# Patient Record
Sex: MALE | Race: WHITE | Employment: OTHER | URBAN - NONMETROPOLITAN AREA
[De-identification: names, ages, dates, MRNs, and addresses within clinical notes are randomized per-mention and may not be internally consistent; named-entity substitution may affect disease eponyms.]

---

## 2019-08-16 ENCOUNTER — APPOINTMENT (OUTPATIENT)
Dept: GENERAL RADIOLOGY | Age: 69
End: 2019-08-16
Payer: MEDICARE

## 2019-08-16 ENCOUNTER — APPOINTMENT (OUTPATIENT)
Dept: CT IMAGING | Age: 69
End: 2019-08-16
Payer: MEDICARE

## 2019-08-16 ENCOUNTER — HOSPITAL ENCOUNTER (EMERGENCY)
Age: 69
Discharge: HOME | End: 2019-08-16
Attending: FAMILY MEDICINE
Payer: MEDICARE

## 2019-08-16 VITALS
WEIGHT: 225 LBS | HEART RATE: 82 BPM | SYSTOLIC BLOOD PRESSURE: 121 MMHG | BODY MASS INDEX: 30.48 KG/M2 | RESPIRATION RATE: 18 BRPM | TEMPERATURE: 97.9 F | OXYGEN SATURATION: 100 % | DIASTOLIC BLOOD PRESSURE: 83 MMHG | HEIGHT: 72 IN

## 2019-08-16 DIAGNOSIS — S32.434A CLOSED NONDISPLACED FRACTURE OF ANTERIOR COLUMN OF RIGHT ACETABULUM, INITIAL ENCOUNTER (HCC): Primary | ICD-10-CM

## 2019-08-16 LAB
ALBUMIN SERPL-MCNC: 4.8 G/DL (ref 3.5–5.2)
ALP BLD-CCNC: 46 U/L (ref 40–130)
ALT SERPL-CCNC: 61 U/L (ref 5–41)
ANION GAP SERPL CALCULATED.3IONS-SCNC: 18 MMOL/L (ref 7–19)
APTT: 24.3 SEC (ref 26–36.2)
AST SERPL-CCNC: 49 U/L (ref 5–40)
BASOPHILS ABSOLUTE: 0 K/UL (ref 0–0.2)
BASOPHILS RELATIVE PERCENT: 0.1 % (ref 0–1)
BILIRUB SERPL-MCNC: 1.2 MG/DL (ref 0.2–1.2)
BUN BLDV-MCNC: 34 MG/DL (ref 8–23)
CALCIUM SERPL-MCNC: 10.3 MG/DL (ref 8.8–10.2)
CHLORIDE BLD-SCNC: 99 MMOL/L (ref 98–111)
CO2: 22 MMOL/L (ref 22–29)
CREAT SERPL-MCNC: 1.7 MG/DL (ref 0.5–1.2)
EOSINOPHILS ABSOLUTE: 0 K/UL (ref 0–0.6)
EOSINOPHILS RELATIVE PERCENT: 0.1 % (ref 0–5)
GFR NON-AFRICAN AMERICAN: 40
GLUCOSE BLD-MCNC: 112 MG/DL (ref 74–109)
HCT VFR BLD CALC: 52.5 % (ref 42–52)
HEMOGLOBIN: 17.7 G/DL (ref 14–18)
IMMATURE GRANULOCYTES #: 0 K/UL
INR BLD: 1.13 (ref 0.88–1.18)
LYMPHOCYTES ABSOLUTE: 0.7 K/UL (ref 1.1–4.5)
LYMPHOCYTES RELATIVE PERCENT: 8.1 % (ref 20–40)
MCH RBC QN AUTO: 33 PG (ref 27–31)
MCHC RBC AUTO-ENTMCNC: 33.7 G/DL (ref 33–37)
MCV RBC AUTO: 97.9 FL (ref 80–94)
MONOCYTES ABSOLUTE: 0.7 K/UL (ref 0–0.9)
MONOCYTES RELATIVE PERCENT: 8.4 % (ref 0–10)
NEUTROPHILS ABSOLUTE: 7 K/UL (ref 1.5–7.5)
NEUTROPHILS RELATIVE PERCENT: 82.9 % (ref 50–65)
PDW BLD-RTO: 13.1 % (ref 11.5–14.5)
PLATELET # BLD: 207 K/UL (ref 130–400)
PMV BLD AUTO: 10 FL (ref 9.4–12.4)
POTASSIUM SERPL-SCNC: 4.6 MMOL/L (ref 3.5–5)
PROTHROMBIN TIME: 13.9 SEC (ref 12–14.6)
RBC # BLD: 5.36 M/UL (ref 4.7–6.1)
SODIUM BLD-SCNC: 139 MMOL/L (ref 136–145)
TOTAL PROTEIN: 8.7 G/DL (ref 6.6–8.7)
WBC # BLD: 8.5 K/UL (ref 4.8–10.8)

## 2019-08-16 PROCEDURE — 85730 THROMBOPLASTIN TIME PARTIAL: CPT

## 2019-08-16 PROCEDURE — 36415 COLL VENOUS BLD VENIPUNCTURE: CPT

## 2019-08-16 PROCEDURE — 80053 COMPREHEN METABOLIC PANEL: CPT

## 2019-08-16 PROCEDURE — 85025 COMPLETE CBC W/AUTO DIFF WBC: CPT

## 2019-08-16 PROCEDURE — 6370000000 HC RX 637 (ALT 250 FOR IP): Performed by: FAMILY MEDICINE

## 2019-08-16 PROCEDURE — 73502 X-RAY EXAM HIP UNI 2-3 VIEWS: CPT

## 2019-08-16 PROCEDURE — 73030 X-RAY EXAM OF SHOULDER: CPT

## 2019-08-16 PROCEDURE — 2580000003 HC RX 258: Performed by: FAMILY MEDICINE

## 2019-08-16 PROCEDURE — 85610 PROTHROMBIN TIME: CPT

## 2019-08-16 PROCEDURE — 72192 CT PELVIS W/O DYE: CPT

## 2019-08-16 PROCEDURE — 71100 X-RAY EXAM RIBS UNI 2 VIEWS: CPT

## 2019-08-16 PROCEDURE — 99284 EMERGENCY DEPT VISIT MOD MDM: CPT

## 2019-08-16 RX ORDER — ONDANSETRON 4 MG/1
4 TABLET, ORALLY DISINTEGRATING ORAL ONCE
Status: COMPLETED | OUTPATIENT
Start: 2019-08-16 | End: 2019-08-16

## 2019-08-16 RX ORDER — LISINOPRIL AND HYDROCHLOROTHIAZIDE 25; 20 MG/1; MG/1
1 TABLET ORAL DAILY
COMMUNITY

## 2019-08-16 RX ORDER — HYDROCODONE BITARTRATE AND ACETAMINOPHEN 5; 325 MG/1; MG/1
2 TABLET ORAL EVERY 6 HOURS PRN
Qty: 12 TABLET | Refills: 0 | Status: SHIPPED | OUTPATIENT
Start: 2019-08-16 | End: 2019-08-19

## 2019-08-16 RX ORDER — ONDANSETRON 4 MG/1
4 TABLET, ORALLY DISINTEGRATING ORAL EVERY 8 HOURS PRN
Qty: 15 TABLET | Refills: 0 | Status: SHIPPED | OUTPATIENT
Start: 2019-08-16

## 2019-08-16 RX ORDER — 0.9 % SODIUM CHLORIDE 0.9 %
1000 INTRAVENOUS SOLUTION INTRAVENOUS ONCE
Status: COMPLETED | OUTPATIENT
Start: 2019-08-16 | End: 2019-08-16

## 2019-08-16 RX ORDER — HYDROCODONE BITARTRATE AND ACETAMINOPHEN 5; 325 MG/1; MG/1
1 TABLET ORAL ONCE
Status: COMPLETED | OUTPATIENT
Start: 2019-08-16 | End: 2019-08-16

## 2019-08-16 RX ADMIN — SODIUM CHLORIDE 1000 ML: 9 INJECTION, SOLUTION INTRAVENOUS at 10:15

## 2019-08-16 RX ADMIN — ONDANSETRON 4 MG: 4 TABLET, ORALLY DISINTEGRATING ORAL at 13:31

## 2019-08-16 RX ADMIN — HYDROCODONE BITARTRATE AND ACETAMINOPHEN 1 TABLET: 5; 325 TABLET ORAL at 13:31

## 2019-08-16 ASSESSMENT — PAIN SCALES - GENERAL
PAINLEVEL_OUTOF10: 2
PAINLEVEL_OUTOF10: 2
PAINLEVEL_OUTOF10: 3
PAINLEVEL_OUTOF10: 4

## 2019-08-16 ASSESSMENT — ENCOUNTER SYMPTOMS
COLOR CHANGE: 0
DIARRHEA: 0
COUGH: 0
VOMITING: 0
WHEEZING: 0
ABDOMINAL PAIN: 0
BACK PAIN: 0
SHORTNESS OF BREATH: 0
NAUSEA: 0
SORE THROAT: 0

## 2019-08-16 ASSESSMENT — PAIN DESCRIPTION - LOCATION
LOCATION: HIP;SHOULDER;RIB CAGE
LOCATION: HIP
LOCATION: HIP

## 2019-08-16 ASSESSMENT — PAIN DESCRIPTION - PAIN TYPE
TYPE: ACUTE PAIN

## 2019-08-16 ASSESSMENT — PAIN DESCRIPTION - FREQUENCY
FREQUENCY: CONTINUOUS
FREQUENCY: CONTINUOUS

## 2019-08-16 ASSESSMENT — PAIN DESCRIPTION - ORIENTATION
ORIENTATION: RIGHT

## 2019-08-16 ASSESSMENT — PAIN DESCRIPTION - ONSET: ONSET: SUDDEN

## 2023-05-22 ENCOUNTER — TELEPHONE (OUTPATIENT)
Dept: GASTROENTEROLOGY | Age: 73
End: 2023-05-22

## 2023-05-22 NOTE — TELEPHONE ENCOUNTER
Called patient to remind them of their procedure with Dr. Anastasiia Quezada  on 5/25/23 to arrive at [address] [facility]

## 2023-05-24 ENCOUNTER — ANESTHESIA EVENT (OUTPATIENT)
Dept: OPERATING ROOM | Age: 73
End: 2023-05-24

## 2023-05-25 ENCOUNTER — ANESTHESIA (OUTPATIENT)
Dept: OPERATING ROOM | Age: 73
End: 2023-05-25

## 2023-05-25 ENCOUNTER — APPOINTMENT (OUTPATIENT)
Dept: OPERATING ROOM | Age: 73
End: 2023-05-25
Attending: INTERNAL MEDICINE

## 2023-05-25 ENCOUNTER — HOSPITAL ENCOUNTER (OUTPATIENT)
Age: 73
Setting detail: SPECIMEN
Discharge: HOME | End: 2023-05-25

## 2023-05-25 ENCOUNTER — HOSPITAL ENCOUNTER (OUTPATIENT)
Age: 73
Setting detail: OUTPATIENT SURGERY
Discharge: HOME | End: 2023-05-25
Attending: INTERNAL MEDICINE | Admitting: INTERNAL MEDICINE
Payer: MEDICARE

## 2023-05-25 VITALS
BODY MASS INDEX: 31.15 KG/M2 | OXYGEN SATURATION: 93 % | TEMPERATURE: 97.4 F | DIASTOLIC BLOOD PRESSURE: 69 MMHG | WEIGHT: 230 LBS | SYSTOLIC BLOOD PRESSURE: 164 MMHG | HEIGHT: 72 IN | HEART RATE: 76 BPM | RESPIRATION RATE: 16 BRPM

## 2023-05-25 PROCEDURE — 45390 COLONOSCOPY W/RESECTION: CPT | Performed by: INTERNAL MEDICINE

## 2023-05-25 PROCEDURE — 45381 COLONOSCOPY SUBMUCOUS NJX: CPT

## 2023-05-25 PROCEDURE — 45385 COLONOSCOPY W/LESION REMOVAL: CPT

## 2023-05-25 PROCEDURE — 45380 COLONOSCOPY AND BIOPSY: CPT

## 2023-05-25 PROCEDURE — 45390 COLONOSCOPY W/RESECTION: CPT

## 2023-05-25 PROCEDURE — 45380 COLONOSCOPY AND BIOPSY: CPT | Performed by: INTERNAL MEDICINE

## 2023-05-25 PROCEDURE — 45381 COLONOSCOPY SUBMUCOUS NJX: CPT | Performed by: INTERNAL MEDICINE

## 2023-05-25 PROCEDURE — 45385 COLONOSCOPY W/LESION REMOVAL: CPT | Performed by: INTERNAL MEDICINE

## 2023-05-25 PROCEDURE — 45382 COLONOSCOPY W/CONTROL BLEED: CPT

## 2023-05-25 RX ORDER — EPINEPHRINE 1 MG/ML
INJECTION, SOLUTION, CONCENTRATE INTRAVENOUS PRN
Status: DISCONTINUED | OUTPATIENT
Start: 2023-05-25 | End: 2023-05-25 | Stop reason: ALTCHOICE

## 2023-05-25 RX ORDER — LIDOCAINE HYDROCHLORIDE 10 MG/ML
INJECTION, SOLUTION EPIDURAL; INFILTRATION; INTRACAUDAL; PERINEURAL PRN
Status: DISCONTINUED | OUTPATIENT
Start: 2023-05-25 | End: 2023-05-25 | Stop reason: SDUPTHER

## 2023-05-25 RX ORDER — SODIUM CHLORIDE, SODIUM LACTATE, POTASSIUM CHLORIDE, CALCIUM CHLORIDE 600; 310; 30; 20 MG/100ML; MG/100ML; MG/100ML; MG/100ML
INJECTION, SOLUTION INTRAVENOUS CONTINUOUS
Status: DISCONTINUED | OUTPATIENT
Start: 2023-05-25 | End: 2023-05-25 | Stop reason: HOSPADM

## 2023-05-25 RX ORDER — PROPOFOL 10 MG/ML
INJECTION, EMULSION INTRAVENOUS PRN
Status: DISCONTINUED | OUTPATIENT
Start: 2023-05-25 | End: 2023-05-25 | Stop reason: SDUPTHER

## 2023-05-25 RX ADMIN — LIDOCAINE HYDROCHLORIDE 30 MG: 10 INJECTION, SOLUTION EPIDURAL; INFILTRATION; INTRACAUDAL; PERINEURAL at 11:13

## 2023-05-25 RX ADMIN — SODIUM CHLORIDE, SODIUM LACTATE, POTASSIUM CHLORIDE, CALCIUM CHLORIDE: 600; 310; 30; 20 INJECTION, SOLUTION INTRAVENOUS at 10:35

## 2023-05-25 RX ADMIN — PROPOFOL 700 MG: 10 INJECTION, EMULSION INTRAVENOUS at 11:13

## 2023-05-25 ASSESSMENT — PAIN - FUNCTIONAL ASSESSMENT: PAIN_FUNCTIONAL_ASSESSMENT: NONE - DENIES PAIN

## 2023-05-25 NOTE — ANESTHESIA PRE PROCEDURE
Department of Anesthesiology  Preprocedure Note       Name:  Gary Self   Age:  75 y.o.  :  1950                                          MRN:  [id number]         Date:  2023      Surgeon: Christofer Resendiz):  Ирина Hamm MD    Procedure: Procedure(s):  COLORECTAL CANCER SCREENING, NOT HIGH RISK    Medications prior to admission:   Prior to Admission medications    Medication Sig Start Date End Date Taking? Authorizing Provider   lisinopril-hydrochlorothiazide (PRINZIDE;ZESTORETIC) 20-25 MG per tablet Take 1 tablet by mouth daily    Historical Provider, MD   ondansetron (ZOFRAN ODT) 4 MG disintegrating tablet Take 1 tablet by mouth every 8 hours as needed for Nausea or Vomiting 19   Amarilis Hough MD       Current medications:    Current Facility-Administered Medications   Medication Dose Route Frequency Provider Last Rate Last Admin    lactated ringers IV soln infusion   IntraVENous Continuous Ирина Hamm MD           Allergies:  No Known Allergies    Problem List:  There is no problem list on file for this patient. Past Medical History:        Diagnosis Date    Hypertension        Past Surgical History:        Procedure Laterality Date    LEG SURGERY Left        Social History:    Social History     Tobacco Use    Smoking status: Former     Types: Cigarettes     Quit date: 2003     Years since quittin.7    Smokeless tobacco: Never   Substance Use Topics    Alcohol use: Not on file     Comment: daily                                Counseling given: Not Answered      Vital Signs (Current): There were no vitals filed for this visit.                                            BP Readings from Last 3 Encounters:   19 121/83       NPO Status:                                                                                 BMI:   Wt Readings from Last 3 Encounters:   19 225 lb (102.1 kg)     There is no height or weight on file to calculate BMI.    CBC:   Lab Results

## 2023-05-25 NOTE — ANESTHESIA POSTPROCEDURE EVALUATION
Department of Anesthesiology  Postprocedure Note    Patient: Jyotsna Pena  MRN: [id number]  YOB: 1950  Date of evaluation: 5/25/2023      Procedure Summary     Date: 05/25/23 Room / Location: On license of [facility] ENDO 02 / [address]    Anesthesia Start: 1106 Anesthesia Stop:     Procedures:       COLORECTAL CANCER SCREENING, NOT HIGH RISK (Abdomen)      COLONOSCOPY POLYPECTOMY ABLATION (Abdomen) Diagnosis:       Screen for colon cancer      (Screen for colon cancer [Z12.11])    Surgeons: Cheikh Cadena MD Responsible Provider: ORTIZ Hamilton CRNA    Anesthesia Type: general, TIVA ASA Status: 3          Anesthesia Type: No value filed.     Alia Phase I:      Alia Phase II:        Anesthesia Post Evaluation    Patient location during evaluation: bedside  Patient participation: complete - patient participated  Level of consciousness: sleepy but conscious  Pain score: 0  Airway patency: patent  Nausea & Vomiting: no nausea and no vomiting  Complications: no  Cardiovascular status: blood pressure returned to baseline  Respiratory status: acceptable, room air and spontaneous ventilation  Hydration status: euvolemic

## 2023-05-31 ENCOUNTER — TELEPHONE (OUTPATIENT)
Dept: GASTROENTEROLOGY | Age: 73
End: 2023-05-31

## 2023-05-31 ENCOUNTER — TRANSCRIBE ORDERS (OUTPATIENT)
Dept: ADMINISTRATIVE | Facility: HOSPITAL | Age: 73
End: 2023-05-31

## 2023-05-31 DIAGNOSIS — K62.89 RECTAL MASS: Primary | ICD-10-CM

## 2023-05-31 NOTE — TELEPHONE ENCOUNTER
Message for  diana for Patti Kasper. Patient's wife called the office leaving a VM @ 3:19 pm to let Lara know the MRI has been scheduled for [facility] tomorrow at 12:00 noon.   diana

## 2023-05-31 NOTE — TELEPHONE ENCOUNTER
[facility] and first available was 6/19. They have submitted an email request to move this up. They will notify patient with apt. Pt's wife is aware and will try to let me know when it's scheduled, otherwise I will check on it.     ----- Message from Cris Marcos MD sent at 5/31/2023  2:06 PM CDT -----  Results discussed with patient over the phone. Notable multiple advanced adenomas. 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 Pending those results, patient will need referral to general surgery to consider transanal resection the rectal mass.      Please cc this note to patient's PCP, Dr Merlyn Merino

## 2023-06-01 ENCOUNTER — HOSPITAL ENCOUNTER (OUTPATIENT)
Dept: MRI IMAGING | Facility: HOSPITAL | Age: 73
Discharge: HOME | End: 2023-06-01
Admitting: INTERNAL MEDICINE

## 2023-06-01 DIAGNOSIS — K62.89 RECTAL MASS: ICD-10-CM

## 2023-06-01 LAB — CREAT BLDA-MCNC: 0.8 MG/DL (ref 0.6–1.3)

## 2023-06-01 PROCEDURE — A9577 INJ MULTIHANCE: HCPCS | Performed by: INTERNAL MEDICINE

## 2023-06-01 PROCEDURE — 0 GADOBENATE DIMEGLUMINE 529 MG/ML SOLUTION: Performed by: INTERNAL MEDICINE

## 2023-06-01 PROCEDURE — 72197 MRI PELVIS W/O & W/DYE: CPT

## 2023-06-01 PROCEDURE — 82565 ASSAY OF CREATININE: CPT

## 2023-06-01 RX ADMIN — GADOBENATE DIMEGLUMINE 20 ML: 529 INJECTION, SOLUTION INTRAVENOUS at 13:56

## 2023-06-02 DIAGNOSIS — K62.89 RECTAL MASS: ICD-10-CM

## 2025-02-03 ENCOUNTER — APPOINTMENT (OUTPATIENT)
Dept: OPERATING ROOM | Age: 75
End: 2025-02-03
Attending: INTERNAL MEDICINE

## 2025-02-03 ENCOUNTER — HOSPITAL ENCOUNTER (OUTPATIENT)
Age: 75
Setting detail: SPECIMEN
Discharge: HOME | End: 2025-02-03
Payer: MEDICARE

## 2025-02-03 ENCOUNTER — HOSPITAL ENCOUNTER (OUTPATIENT)
Age: 75
Setting detail: OUTPATIENT SURGERY
Discharge: HOME | End: 2025-02-03
Attending: INTERNAL MEDICINE | Admitting: INTERNAL MEDICINE
Payer: MEDICARE

## 2025-02-03 ENCOUNTER — ANESTHESIA EVENT (OUTPATIENT)
Dept: OPERATING ROOM | Age: 75
End: 2025-02-03

## 2025-02-03 ENCOUNTER — ANESTHESIA (OUTPATIENT)
Dept: OPERATING ROOM | Age: 75
End: 2025-02-03

## 2025-02-03 VITALS
HEART RATE: 77 BPM | BODY MASS INDEX: 33.18 KG/M2 | WEIGHT: 245 LBS | DIASTOLIC BLOOD PRESSURE: 81 MMHG | SYSTOLIC BLOOD PRESSURE: 125 MMHG | OXYGEN SATURATION: 97 % | TEMPERATURE: 98 F | HEIGHT: 72 IN | RESPIRATION RATE: 16 BRPM

## 2025-02-03 PROCEDURE — 45380 COLONOSCOPY AND BIOPSY: CPT | Performed by: INTERNAL MEDICINE

## 2025-02-03 PROCEDURE — 45381 COLONOSCOPY SUBMUCOUS NJX: CPT

## 2025-02-03 PROCEDURE — 45385 COLONOSCOPY W/LESION REMOVAL: CPT

## 2025-02-03 PROCEDURE — 45381 COLONOSCOPY SUBMUCOUS NJX: CPT | Performed by: INTERNAL MEDICINE

## 2025-02-03 PROCEDURE — 45380 COLONOSCOPY AND BIOPSY: CPT

## 2025-02-03 PROCEDURE — 88305 TISSUE EXAM BY PATHOLOGIST: CPT

## 2025-02-03 PROCEDURE — 45385 COLONOSCOPY W/LESION REMOVAL: CPT | Performed by: INTERNAL MEDICINE

## 2025-02-03 RX ORDER — LIDOCAINE HYDROCHLORIDE 10 MG/ML
INJECTION, SOLUTION EPIDURAL; INFILTRATION; INTRACAUDAL; PERINEURAL
Status: DISCONTINUED | OUTPATIENT
Start: 2025-02-03 | End: 2025-02-03 | Stop reason: SDUPTHER

## 2025-02-03 RX ORDER — SODIUM CHLORIDE, SODIUM LACTATE, POTASSIUM CHLORIDE, CALCIUM CHLORIDE 600; 310; 30; 20 MG/100ML; MG/100ML; MG/100ML; MG/100ML
INJECTION, SOLUTION INTRAVENOUS CONTINUOUS
Status: DISCONTINUED | OUTPATIENT
Start: 2025-02-03 | End: 2025-02-03 | Stop reason: HOSPADM

## 2025-02-03 RX ORDER — SODIUM CHLORIDE 9 MG/ML
INJECTION, SOLUTION INTRAVENOUS CONTINUOUS
Status: DISCONTINUED | OUTPATIENT
Start: 2025-02-03 | End: 2025-02-03 | Stop reason: HOSPADM

## 2025-02-03 RX ORDER — PROPOFOL 10 MG/ML
INJECTION, EMULSION INTRAVENOUS
Status: DISCONTINUED | OUTPATIENT
Start: 2025-02-03 | End: 2025-02-03 | Stop reason: SDUPTHER

## 2025-02-03 RX ADMIN — LIDOCAINE HYDROCHLORIDE 50 MG: 10 INJECTION, SOLUTION EPIDURAL; INFILTRATION; INTRACAUDAL; PERINEURAL at 08:48

## 2025-02-03 RX ADMIN — SODIUM CHLORIDE, SODIUM LACTATE, POTASSIUM CHLORIDE, CALCIUM CHLORIDE: 600; 310; 30; 20 INJECTION, SOLUTION INTRAVENOUS at 08:37

## 2025-02-03 RX ADMIN — PROPOFOL 500 MG: 10 INJECTION, EMULSION INTRAVENOUS at 08:48

## 2025-02-03 RX ADMIN — SODIUM CHLORIDE: 9 INJECTION, SOLUTION INTRAVENOUS at 08:34

## 2025-02-03 ASSESSMENT — PAIN - FUNCTIONAL ASSESSMENT: PAIN_FUNCTIONAL_ASSESSMENT: NONE - DENIES PAIN

## 2025-02-03 NOTE — ANESTHESIA POSTPROCEDURE EVALUATION
Department of Anesthesiology  Postprocedure Note    Patient: Jean Borges  MRN: [id number]  YOB: 1950  Date of evaluation: 2/3/2025    Procedure Summary       Date: 02/03/25 Room / Location: [place] [address] / [facility]    Anesthesia Start: 0842 Anesthesia Stop:     Procedure: COLORECTAL CANCER SCREENING, NOT HIGH RISK (Abdomen) Diagnosis:       Screen for colon cancer      (Screen for colon cancer [Z12.11])    Surgeons: Paul Esquivel MD Responsible Provider: Dorothy Newman APRN - CRNA    Anesthesia Type: general, TIVA ASA Status: 3            Anesthesia Type: No value filed.    Alia Phase I: Alia Score: 10    Alia Phase II:      Anesthesia Post Evaluation    Patient location during evaluation: bedside  Patient participation: complete - patient participated  Level of consciousness: sleepy but conscious  Pain score: 0  Airway patency: patent  Nausea & Vomiting: no nausea and no vomiting  Cardiovascular status: blood pressure returned to baseline  Respiratory status: acceptable, room air and spontaneous ventilation  Hydration status: euvolemic  Pain management: adequate    No notable events documented.

## 2025-02-03 NOTE — OP NOTE
Patient: Jean Borges : 1950  [facility] Rec#: [id number] Acc#: [id number]   Primary Care Provider Dian Stoner MD    Date of Procedure:  2/3/2025    Endoscopist: Paul Esquivel MD    Referring Provider: Dian Stoner MD    Operation Performed:     Colonoscopy with snare polypectomy  Colonoscopy with submucosal injection  Colonoscopy with biopsy    Indications: Screening- hx of rectal cancer/colon polyps    Anesthesia:  Sedation was administered by anesthesia who monitored the patient during the procedure.    I met with Jean Borges prior to procedure. We discussed the procedure itself, and I have discussed the risks of endoscopy (including-- but not limited to-- pain, discomfort, bleeding potentially requiring second endoscopic procedure and/or blood transfusion, organ perforation requiring operative repair, damage to organs near the colon, infection, aspiration, cardiopulmonary/allergic reaction), benefits, indications to endoscopy. Additionally, we discussed options other than colonoscopy. The patient expressed understanding. All questions answered. The patient decided to proceed with the procedure.  Signed informed consent was placed on the chart.    Blood Loss: minimal    Withdrawal time: n/a  Bowel Prep: adequate     Complications: no immediate complications    DESCRIPTION OF PROCEDURE:     A time out was performed. After written informed consent was obtained, the patient was placed in the left lateral position.     The perianal area was inspected, and a digital rectal exam was performed. A rectal exam was performed: normal tone, no palpable lesions. At this point, a forward viewing Olympus colonoscope was inserted into the anus and carefully advanced to the cecum.  The cecum was identified by the ileocecal valve and the appendiceal orifice. The colonoscope was then slowly withdrawn with careful inspection of the mucosa in a linear and circumferential fashion. The scope was retroflexed in the

## 2025-02-03 NOTE — H&P
Patient Name: Jean Borges  : 1950  MRN: [id number]  DATE: 25    Allergies: No Known Allergies     ENDOSCOPY  History and Physical    Procedure:    [] Diagnostic Colonoscopy       [x] Screening Colonoscopy  [] EGD      [] ERCP      [] EUS       [] Other    [x] Previous office notes/History and Physical reviewed from the patients chart. Please see EMR for further details of HPI. I have examined the patient's status immediately prior to the procedure and:      Indications/HPI:       [x] Screening              [x] History of Polyps      []Fhx of colon CA/polyps []+Cologard/DNA/Stool Testing      Anesthesia:   [x] MAC [] Moderate Sedation   [] General   [] None     ROS: 12 pt review of systems was negative unless stated above    Medications:   Prior to Admission medications    Medication Sig Start Date End Date Taking? Authorizing Provider   lisinopril-hydrochlorothiazide (PRINZIDE;ZESTORETIC) 20-25 MG per tablet Take 1 tablet by mouth daily    Provider, MD Codie   ondansetron (ZOFRAN ODT) 4 MG disintegrating tablet Take 1 tablet by mouth every 8 hours as needed for Nausea or Vomiting 19   Ion Hector MD       Past Medical History:  Past Medical History:   Diagnosis Date    Hypertension        Past Surgical History:  Past Surgical History:   Procedure Laterality Date    COLONOSCOPY N/A 2023    Dr CLIFFORD Esquivel-bao/splenic flexure piecmeal, hemostatic clip placement x 2 and tattoo placement, rectal piecemeal, submucosal inj, polyp was not able to be removed completely-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 1-Repeat in 3 months    COLONOSCOPY  2023    Dr CLIFFORD Esquivel-bao/splenic flexure piecmeal, hemostatic clip placement x 2 and tattoo placement, rectal piecemeal, submucosal inj, polyp was not able to be removed completely-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

## 2025-02-03 NOTE — ANESTHESIA PRE PROCEDURE
Department of Anesthesiology  Preprocedure Note       Name:  Jean Borges   Age:  74 y.o.  :  1950                                          MRN:  [id number]         Date:  2/3/2025      Surgeon: Surgeon(s):  Paul Esquivel MD    Procedure: Procedure(s):  COLORECTAL CANCER SCREENING, NOT HIGH RISK    Medications prior to admission:   Prior to Admission medications    Medication Sig Start Date End Date Taking? Authorizing Provider   lisinopril-hydrochlorothiazide (PRINZIDE;ZESTORETIC) 20-25 MG per tablet Take 1 tablet by mouth daily    Provider, MD Codie   ondansetron (ZOFRAN ODT) 4 MG disintegrating tablet Take 1 tablet by mouth every 8 hours as needed for Nausea or Vomiting 19   Ion Hector MD       Current medications:    No current facility-administered medications for this encounter.       Allergies:  No Known Allergies    Problem List:  There is no problem list on file for this patient.      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 1-Repeat in 3 months   • COLONOSCOPY  2023    Dr CLIFFORD Bojorquez/splenic flexure piecmeal, hemostatic clip placement x 2 and tattoo placement, rectal piecemeal, submucosal inj, polyp was not able to be removed completely-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 1-Repeat in 3 months   • COLONOSCOPY N/A 2023    Dr CLIFFORD Bojorquez/splenic flexure piecmeal, hemostatic clip placement x 2 and tattoo placement, rectal piecemeal, submucosal inj, polyp was not able to be removed completely-Intramucosal adenocarcinoma arising from

## 2025-02-03 NOTE — DISCHARGE INSTRUCTIONS
Recommendations:  1. Repeat colonoscopy: pending pathology.   2. Await biopsy results  3. Patient will require surgical resection of the above mass.    POST-OP ORDERS: ENDOSCOPY & COLONOSCOPY:    1. Rest today.    2. DO NOT eat or drink until wide awake; eat your usual diet today in moderate amount only.    3. DO NOT drive today.    4. Call physician if you have severe pain, vomiting, fever, rectal bleeding or black bowel movements.    5.  If a biopsy was taken or a polyp removed, you should expect to hear results in about 7-10 days.  If you have heard nothing from your physician by then, call the office for results.    6.  Discharge home when patient awake, vitals signs stable and tolerating liquids.

## 2025-02-05 ENCOUNTER — TELEPHONE (OUTPATIENT)
Dept: GASTROENTEROLOGY | Age: 75
End: 2025-02-05

## 2025-02-05 ENCOUNTER — TELEPHONE (OUTPATIENT)
Dept: SURGERY | Facility: CLINIC | Age: 75
End: 2025-02-05
Payer: COMMERCIAL

## 2025-02-05 NOTE — TELEPHONE ENCOUNTER
Attempted to contact PT regarding scheduling for their referral. I left them a voicemail with our office number and requested they call us back at their earliest convenience to get scheduled.    CBC  02/05

## 2025-02-05 NOTE — TELEPHONE ENCOUNTER
PT advised of path results and the need for surgical referral. I am faxing his referral to Dr Muhammad per Dr Esquivel's request. PT was agreeable. I also advised patient's daughter of this per pt request. They will let me know if they don't hear anything in a few days for an apt.

## 2025-02-06 ENCOUNTER — OFFICE VISIT (OUTPATIENT)
Dept: SURGERY | Facility: CLINIC | Age: 75
End: 2025-02-06
Payer: MEDICARE

## 2025-02-06 VITALS
HEIGHT: 72 IN | SYSTOLIC BLOOD PRESSURE: 169 MMHG | WEIGHT: 255.6 LBS | BODY MASS INDEX: 34.62 KG/M2 | DIASTOLIC BLOOD PRESSURE: 83 MMHG | OXYGEN SATURATION: 93 % | HEART RATE: 98 BPM

## 2025-02-06 DIAGNOSIS — E66.811 CLASS 1 DRUG-INDUCED OBESITY WITH BODY MASS INDEX (BMI) OF 34.0 TO 34.9 IN ADULT, UNSPECIFIED WHETHER SERIOUS COMORBIDITY PRESENT: ICD-10-CM

## 2025-02-06 DIAGNOSIS — C18.6 MALIGNANT NEOPLASM OF DESCENDING COLON: ICD-10-CM

## 2025-02-06 DIAGNOSIS — D12.6 TUBULAR ADENOMA OF COLON: Primary | ICD-10-CM

## 2025-02-06 DIAGNOSIS — E66.1 CLASS 1 DRUG-INDUCED OBESITY WITH BODY MASS INDEX (BMI) OF 34.0 TO 34.9 IN ADULT, UNSPECIFIED WHETHER SERIOUS COMORBIDITY PRESENT: ICD-10-CM

## 2025-02-06 DIAGNOSIS — D12.6 HIGH GRADE DYSPLASIA IN COLONIC ADENOMA: ICD-10-CM

## 2025-02-06 RX ORDER — CELECOXIB 100 MG/1
200 CAPSULE ORAL ONCE
OUTPATIENT
Start: 2025-02-06 | End: 2025-02-06

## 2025-02-06 RX ORDER — ACETAMINOPHEN 325 MG/1
650 TABLET ORAL ONCE
OUTPATIENT
Start: 2025-02-06 | End: 2025-02-06

## 2025-02-06 RX ORDER — LISINOPRIL 40 MG/1
1 TABLET ORAL DAILY
COMMUNITY

## 2025-02-06 RX ORDER — SIMVASTATIN 10 MG
TABLET ORAL EVERY 24 HOURS
COMMUNITY

## 2025-02-06 RX ORDER — GABAPENTIN 100 MG/1
300 CAPSULE ORAL 3 TIMES DAILY
OUTPATIENT
Start: 2025-02-06

## 2025-02-06 NOTE — PROGRESS NOTES
"          GENERAL SURGERY OFFICE NEW PATIENT HISTORY AND PHYSICAL    Referring Provider: No ref. provider found  Primary Care Provider: Holly Montoya MD    No chief complaint on file.      Subjective .     History of present illness:  Kodi Stark is a 74 y.o. male who presents as a referral by Dr. Bustamante for a tubular adenoma with high-grade dysplasia of the ascending colon.  The patient has had numerous colon polyp disease in the past.  Most recently, he underwent a colonoscopy that showed several polyps, most notably a 2.5 cm invasive appearing mass with central depression in the proximal ascending colon that was approximately 40% circumferentially involved that was not endoscopically resectable.  Pathology revealed that this was an adenomatous polyp with at least high-grade dysplasia.  Given the size of the polyp, there is concern for underlying malignancy.      History:  Past Medical History:   Diagnosis Date    High cholesterol     Hypertension    ,   Past Surgical History:   Procedure Laterality Date    COLONOSCOPY     , No family history on file.,   Social History     Tobacco Use    Smoking status: Never     Passive exposure: Never   Vaping Use    Vaping status: Never Used   Substance Use Topics    Alcohol use: Never    Drug use: Never       Current Outpatient Medications:     lisinopril (PRINIVIL,ZESTRIL) 40 MG tablet, Take 1 tablet by mouth Daily., Disp: , Rfl:     simvastatin (ZOCOR) 10 MG tablet, Daily., Disp: , Rfl:     Allergies:  Patient has no known allergies.      The following portions of the patient's history were reviewed and updated as appropriate: allergies, current medications, past family history, past medical history, past social history, past surgical history, and problem list.      Review of Systems  A comprehensive 14 point review of systems was performed and is negative unless otherwise noted      Objective   /83   Pulse 98   Ht 182.9 cm (72\")   Wt 116 kg (255 lb 9.6 oz)  " Report and care given to Marcia Posada RN.    SpO2 93%   BMI 34.67 kg/m²     BMI followup discussion/instruction with patient: continue with current weight loss program, educational material, exercise counseling, nutrition counseling, and Information on healthy weight added to patient's after visit summary.      Physical Exam  Constitutional:       Appearance: Normal appearance. He is normal weight.      Comments: Pleasant elderly male, in no acute distress. Normal development, normal body habitus. Well nourished   HENT:      Head: Normocephalic and atraumatic.      Right Ear: External ear normal.      Left Ear: External ear normal.      Ears:      Comments: Hearing intact     Nose: Nose normal.      Comments: Nares patent, no septal deviation, no drainage     Mouth/Throat:      Comments: Airway patent, dentition intact, mucus membranes moist  Eyes:      Extraocular Movements: Extraocular movements intact.      Conjunctiva/sclera: Conjunctivae normal.      Pupils: Pupils are equal, round, and reactive to light.      Comments: External eyelids grossly normal, vision intact, no scleral icterus   Neck:      Comments: Trachea midline  Cardiovascular:      Rate and Rhythm: Normal rate.      Comments: Normotensive, no JVD bilaterally  Pulmonary:      Effort: Pulmonary effort is normal.      Breath sounds: Normal breath sounds.      Comments: Normal respiratory rate  Abdominal:      Comments: Obese, soft, non tender. No scars, hernias or masses.   Musculoskeletal:         General: Normal range of motion.      Cervical back: Normal range of motion.   Skin:     General: Skin is warm and dry.      Comments: Skin color is consistent with ethnicity   Neurological:      General: No focal deficit present.      Mental Status: He is alert and oriented to person, place, and time.   Psychiatric:         Mood and Affect: Mood normal.         Behavior: Behavior normal.         Thought Content: Thought content normal.         Judgment: Judgment normal.      Comments: Patient  understands disease process and has decision making capacity.              Results:  Labs:  I personally reviewed all pertinent labs.   Imaging: I personally reviewed all pertinent imaging studies.   Pathology:        Assessment & Plan   Diagnoses and all orders for this visit:    1. Tubular adenoma of colon (Primary)  -     CT Abdomen Pelvis With Contrast; Future    2. High grade dysplasia in colonic adenoma  -     CT Abdomen Pelvis With Contrast; Future    3. 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  Given the size and appearance of the polyp, there is concern for underlying malignancy and he has been recommended to undergo colon resection.  He does appear to be an appropriate surgical candidate and the mass does appear to be amenable for minimally invasive surgery using the da Kevin robot.  I will obtain a CT of the abdomen pelvis with oral and IV contrast preoperatively to fully evaluate his anatomy and localize the mass.    Plan for laparoscopic robotic-assisted left colectomy.     Preoperative labs to include a CMP, CBC, CEA level and prealbumin. Preoperative EKG and CXR. Impact AR or Ensure surgery immune nutrition preoperatively for 5 days. Neomycin and Flagyl for preoperative antibiotics, along with a MiraLAX bowel prep and clear liquids the day before surgery. ERAS protocol.  Cefoxitin 2 g every 2 hours for preoperative antibiotics.  Admit as inpatient postoperatively.    I discussed the risks, benefits and alternatives to surgery including the risk of injury to surrounding structures including the ureter and bladder, anastomotic leak, uncontrolled bleeding, deep organ space and superficial skin infection, long term drain and/or wound care, the possibility of converting to open surgery, incisional hernias, cancer  diagnosis and possible recurrent, chronic pain and the need for more operations or procedures in the future. Additionally, I counseled the patient on the risk of postoperative cardiopulmonary complications. I gave the patient a chance to ask any questions and answered them thoroughly. The patient understood the risks and was agreeable to proceeding to surgery. Consent was obtained from the patient.                Thank you for the referral and trusting me with the care of your patient.    Kodi Cadena MD, [facility]  General Surgery  2/6/2025  11:06 CST    Please note that portions of this note were completed with a voice recognition program.

## 2025-02-07 ENCOUNTER — TELEPHONE (OUTPATIENT)
Dept: SURGERY | Facility: CLINIC | Age: 75
End: 2025-02-07
Payer: COMMERCIAL

## 2025-02-07 DIAGNOSIS — D12.6 TUBULAR ADENOMA OF COLON: Primary | ICD-10-CM

## 2025-02-07 RX ORDER — METRONIDAZOLE 500 MG/1
500 TABLET ORAL SEE ADMIN INSTRUCTIONS
Qty: 4 TABLET | Refills: 0 | Status: SHIPPED | OUTPATIENT
Start: 2025-02-07 | End: 2025-02-08

## 2025-02-07 RX ORDER — NEOMYCIN SULFATE 500 MG/1
1000 TABLET ORAL 3 TIMES DAILY
Qty: 6 TABLET | Refills: 0 | Status: SHIPPED | OUTPATIENT
Start: 2025-02-07 | End: 2025-02-08

## 2025-02-07 NOTE — TELEPHONE ENCOUNTER
Called and spoke with patients wife. Went over bowel prep instructions. Gave the new time for pre-work. She voiced understanding.

## 2025-02-11 ENCOUNTER — TELEPHONE (OUTPATIENT)
Dept: SURGERY | Facility: CLINIC | Age: 75
End: 2025-02-11
Payer: COMMERCIAL

## 2025-02-11 ENCOUNTER — HOSPITAL ENCOUNTER (OUTPATIENT)
Dept: CT IMAGING | Facility: HOSPITAL | Age: 75
Discharge: HOME | End: 2025-02-11
Payer: MEDICARE

## 2025-02-11 ENCOUNTER — APPOINTMENT (OUTPATIENT)
Dept: GENERAL RADIOLOGY | Facility: HOSPITAL | Age: 75
DRG: 309 | End: 2025-02-11
Payer: MEDICARE

## 2025-02-11 ENCOUNTER — HOSPITAL ENCOUNTER (INPATIENT)
Facility: HOSPITAL | Age: 75
LOS: 2 days | Discharge: HOME | DRG: 309 | End: 2025-02-13
Attending: EMERGENCY MEDICINE | Admitting: INTERNAL MEDICINE
Payer: MEDICARE

## 2025-02-11 ENCOUNTER — OFFICE VISIT (OUTPATIENT)
Dept: CARDIOLOGY | Facility: CLINIC | Age: 75
End: 2025-02-11
Payer: MEDICARE

## 2025-02-11 ENCOUNTER — PRE-ADMISSION TESTING (OUTPATIENT)
Dept: PREADMISSION TESTING | Facility: HOSPITAL | Age: 75
DRG: 309 | End: 2025-02-11
Payer: MEDICARE

## 2025-02-11 VITALS
HEIGHT: 72 IN | BODY MASS INDEX: 33.86 KG/M2 | HEART RATE: 148 BPM | SYSTOLIC BLOOD PRESSURE: 146 MMHG | DIASTOLIC BLOOD PRESSURE: 90 MMHG | WEIGHT: 250 LBS

## 2025-02-11 VITALS
SYSTOLIC BLOOD PRESSURE: 115 MMHG | RESPIRATION RATE: 20 BRPM | HEIGHT: 71 IN | WEIGHT: 254.41 LBS | DIASTOLIC BLOOD PRESSURE: 80 MMHG | BODY MASS INDEX: 35.62 KG/M2 | OXYGEN SATURATION: 94 %

## 2025-02-11 DIAGNOSIS — D12.6 TUBULAR ADENOMA OF COLON: ICD-10-CM

## 2025-02-11 DIAGNOSIS — D12.6 HIGH GRADE DYSPLASIA IN COLONIC ADENOMA: ICD-10-CM

## 2025-02-11 DIAGNOSIS — I48.92 ATRIAL FLUTTER WITH RAPID VENTRICULAR RESPONSE: Primary | ICD-10-CM

## 2025-02-11 DIAGNOSIS — C18.6 MALIGNANT NEOPLASM OF DESCENDING COLON: ICD-10-CM

## 2025-02-11 DIAGNOSIS — Z01.818 PREOPERATIVE EVALUATION TO RULE OUT SURGICAL CONTRAINDICATION: ICD-10-CM

## 2025-02-11 DIAGNOSIS — I48.91 ATRIAL FIBRILLATION WITH RAPID VENTRICULAR RESPONSE: Primary | ICD-10-CM

## 2025-02-11 DIAGNOSIS — I10 ESSENTIAL HYPERTENSION: ICD-10-CM

## 2025-02-11 DIAGNOSIS — I48.91 ATRIAL FIBRILLATION, UNSPECIFIED TYPE: Primary | ICD-10-CM

## 2025-02-11 DIAGNOSIS — I48.91 ATRIAL FIBRILLATION, UNSPECIFIED TYPE: ICD-10-CM

## 2025-02-11 LAB
ABO GROUP BLD: NORMAL
ALBUMIN SERPL-MCNC: 4.3 G/DL (ref 3.5–5.2)
ALBUMIN SERPL-MCNC: 4.5 G/DL (ref 3.5–5.2)
ALBUMIN/GLOB SERPL: 1.1 G/DL
ALBUMIN/GLOB SERPL: 1.1 G/DL
ALP SERPL-CCNC: 48 U/L (ref 39–117)
ALP SERPL-CCNC: 48 U/L (ref 39–117)
ALT SERPL W P-5'-P-CCNC: 19 U/L (ref 1–41)
ALT SERPL W P-5'-P-CCNC: 19 U/L (ref 1–41)
ANION GAP SERPL CALCULATED.3IONS-SCNC: 13 MMOL/L (ref 5–15)
ANION GAP SERPL CALCULATED.3IONS-SCNC: 15 MMOL/L (ref 5–15)
APTT PPP: 26.8 SECONDS (ref 24.5–36)
AST SERPL-CCNC: 25 U/L (ref 1–40)
AST SERPL-CCNC: 25 U/L (ref 1–40)
BASOPHILS # BLD AUTO: 0.03 10*3/MM3 (ref 0–0.2)
BASOPHILS NFR BLD AUTO: 0.4 % (ref 0–1.5)
BILIRUB SERPL-MCNC: 0.7 MG/DL (ref 0–1.2)
BILIRUB SERPL-MCNC: 0.7 MG/DL (ref 0–1.2)
BLD GP AB SCN SERPL QL: NEGATIVE
BUN SERPL-MCNC: 11 MG/DL (ref 8–23)
BUN SERPL-MCNC: 12 MG/DL (ref 8–23)
BUN/CREAT SERPL: 15.5 (ref 7–25)
BUN/CREAT SERPL: 16.9 (ref 7–25)
CALCIUM SPEC-SCNC: 9.5 MG/DL (ref 8.6–10.5)
CALCIUM SPEC-SCNC: 9.9 MG/DL (ref 8.6–10.5)
CEA SERPL-MCNC: 2.54 NG/ML
CHLORIDE SERPL-SCNC: 92 MMOL/L (ref 98–107)
CHLORIDE SERPL-SCNC: 93 MMOL/L (ref 98–107)
CO2 SERPL-SCNC: 24 MMOL/L (ref 22–29)
CO2 SERPL-SCNC: 25 MMOL/L (ref 22–29)
CREAT BLDA-MCNC: 0.9 MG/DL (ref 0.6–1.3)
CREAT BLDA-MCNC: 1.4 MG/DL (ref 0.6–1.3)
CREAT SERPL-MCNC: 0.71 MG/DL (ref 0.76–1.27)
CREAT SERPL-MCNC: 0.71 MG/DL (ref 0.76–1.27)
DEPRECATED RDW RBC AUTO: 42.7 FL (ref 37–54)
DEPRECATED RDW RBC AUTO: 43.1 FL (ref 37–54)
EGFRCR SERPLBLD CKD-EPI 2021: 95.7 ML/MIN/1.73
EGFRCR SERPLBLD CKD-EPI 2021: 95.7 ML/MIN/1.73
EOSINOPHIL # BLD AUTO: 0.02 10*3/MM3 (ref 0–0.4)
EOSINOPHIL NFR BLD AUTO: 0.3 % (ref 0.3–6.2)
ERYTHROCYTE [DISTWIDTH] IN BLOOD BY AUTOMATED COUNT: 12.6 % (ref 12.3–15.4)
ERYTHROCYTE [DISTWIDTH] IN BLOOD BY AUTOMATED COUNT: 12.6 % (ref 12.3–15.4)
GEN 5 1HR TROPONIN T REFLEX: 16 NG/L
GLOBULIN UR ELPH-MCNC: 3.8 GM/DL
GLOBULIN UR ELPH-MCNC: 4 GM/DL
GLUCOSE SERPL-MCNC: 101 MG/DL (ref 65–99)
GLUCOSE SERPL-MCNC: 108 MG/DL (ref 65–99)
HCT VFR BLD AUTO: 49.3 % (ref 37.5–51)
HCT VFR BLD AUTO: 49.3 % (ref 37.5–51)
HGB BLD-MCNC: 16.4 G/DL (ref 13–17.7)
HGB BLD-MCNC: 16.9 G/DL (ref 13–17.7)
IMM GRANULOCYTES # BLD AUTO: 0.03 10*3/MM3 (ref 0–0.05)
IMM GRANULOCYTES NFR BLD AUTO: 0.4 % (ref 0–0.5)
INR PPP: 1.02 (ref 0.91–1.09)
LYMPHOCYTES # BLD AUTO: 1.25 10*3/MM3 (ref 0.7–3.1)
LYMPHOCYTES NFR BLD AUTO: 17.2 % (ref 19.6–45.3)
MAGNESIUM SERPL-MCNC: 2.1 MG/DL (ref 1.6–2.4)
MCH RBC QN AUTO: 30.8 PG (ref 26.6–33)
MCH RBC QN AUTO: 31.5 PG (ref 26.6–33)
MCHC RBC AUTO-ENTMCNC: 33.3 G/DL (ref 31.5–35.7)
MCHC RBC AUTO-ENTMCNC: 34.3 G/DL (ref 31.5–35.7)
MCV RBC AUTO: 92 FL (ref 79–97)
MCV RBC AUTO: 92.7 FL (ref 79–97)
MONOCYTES # BLD AUTO: 0.7 10*3/MM3 (ref 0.1–0.9)
MONOCYTES NFR BLD AUTO: 9.6 % (ref 5–12)
NEUTROPHILS NFR BLD AUTO: 5.23 10*3/MM3 (ref 1.7–7)
NEUTROPHILS NFR BLD AUTO: 72.1 % (ref 42.7–76)
NRBC BLD AUTO-RTO: 0 /100 WBC (ref 0–0.2)
PLATELET # BLD AUTO: 310 10*3/MM3 (ref 140–450)
PLATELET # BLD AUTO: 323 10*3/MM3 (ref 140–450)
PMV BLD AUTO: 10 FL (ref 6–12)
PMV BLD AUTO: 10 FL (ref 6–12)
POTASSIUM SERPL-SCNC: 3.9 MMOL/L (ref 3.5–5.2)
POTASSIUM SERPL-SCNC: 4.2 MMOL/L (ref 3.5–5.2)
PREALB SERPL-MCNC: 15.7 MG/DL (ref 20–40)
PROT SERPL-MCNC: 8.1 G/DL (ref 6–8.5)
PROT SERPL-MCNC: 8.5 G/DL (ref 6–8.5)
PROTHROMBIN TIME: 14 SECONDS (ref 11.8–14.8)
RBC # BLD AUTO: 5.32 10*6/MM3 (ref 4.14–5.8)
RBC # BLD AUTO: 5.36 10*6/MM3 (ref 4.14–5.8)
RH BLD: NEGATIVE
SODIUM SERPL-SCNC: 130 MMOL/L (ref 136–145)
SODIUM SERPL-SCNC: 132 MMOL/L (ref 136–145)
T&S EXPIRATION DATE: NORMAL
TROPONIN T NUMERIC DELTA: 1 NG/L
TROPONIN T SERPL HS-MCNC: 15 NG/L
TSH SERPL DL<=0.05 MIU/L-ACNC: 2.12 UIU/ML (ref 0.27–4.2)
WBC NRBC COR # BLD AUTO: 7.26 10*3/MM3 (ref 3.4–10.8)
WBC NRBC COR # BLD AUTO: 8.34 10*3/MM3 (ref 3.4–10.8)

## 2025-02-11 PROCEDURE — 25010000002 ENOXAPARIN PER 10 MG: Performed by: EMERGENCY MEDICINE

## 2025-02-11 PROCEDURE — 85610 PROTHROMBIN TIME: CPT

## 2025-02-11 PROCEDURE — 71045 X-RAY EXAM CHEST 1 VIEW: CPT

## 2025-02-11 PROCEDURE — 93005 ELECTROCARDIOGRAM TRACING: CPT

## 2025-02-11 PROCEDURE — 93010 ELECTROCARDIOGRAM REPORT: CPT | Performed by: HOSPITALIST

## 2025-02-11 PROCEDURE — 25510000001 IOPAMIDOL 61 % SOLUTION: Performed by: SURGERY

## 2025-02-11 PROCEDURE — 74177 CT ABD & PELVIS W/CONTRAST: CPT

## 2025-02-11 PROCEDURE — 85025 COMPLETE CBC W/AUTO DIFF WBC: CPT

## 2025-02-11 PROCEDURE — 82378 CARCINOEMBRYONIC ANTIGEN: CPT

## 2025-02-11 PROCEDURE — 84134 ASSAY OF PREALBUMIN: CPT

## 2025-02-11 PROCEDURE — 86901 BLOOD TYPING SEROLOGIC RH(D): CPT | Performed by: SURGERY

## 2025-02-11 PROCEDURE — 84484 ASSAY OF TROPONIN QUANT: CPT

## 2025-02-11 PROCEDURE — 93005 ELECTROCARDIOGRAM TRACING: CPT | Performed by: EMERGENCY MEDICINE

## 2025-02-11 PROCEDURE — 25810000003 SODIUM CHLORIDE 0.9 % SOLUTION: Performed by: INTERNAL MEDICINE

## 2025-02-11 PROCEDURE — 85730 THROMBOPLASTIN TIME PARTIAL: CPT

## 2025-02-11 PROCEDURE — 86900 BLOOD TYPING SEROLOGIC ABO: CPT | Performed by: SURGERY

## 2025-02-11 PROCEDURE — 84443 ASSAY THYROID STIM HORMONE: CPT | Performed by: INTERNAL MEDICINE

## 2025-02-11 PROCEDURE — 25510000002 DIATRIZOATE MEGLUMINE & SODIUM PER 1 ML: Performed by: SURGERY

## 2025-02-11 PROCEDURE — 83735 ASSAY OF MAGNESIUM: CPT

## 2025-02-11 PROCEDURE — 80053 COMPREHEN METABOLIC PANEL: CPT

## 2025-02-11 PROCEDURE — 86850 RBC ANTIBODY SCREEN: CPT | Performed by: SURGERY

## 2025-02-11 PROCEDURE — 82565 ASSAY OF CREATININE: CPT | Performed by: SURGERY

## 2025-02-11 PROCEDURE — 85027 COMPLETE CBC AUTOMATED: CPT

## 2025-02-11 PROCEDURE — 36415 COLL VENOUS BLD VENIPUNCTURE: CPT

## 2025-02-11 PROCEDURE — 99285 EMERGENCY DEPT VISIT HI MDM: CPT

## 2025-02-11 PROCEDURE — 25010000002 THIAMINE HCL 200 MG/2ML SOLUTION: Performed by: INTERNAL MEDICINE

## 2025-02-11 RX ORDER — FOLIC ACID 1 MG/1
1 TABLET ORAL DAILY
Status: DISCONTINUED | OUTPATIENT
Start: 2025-02-11 | End: 2025-02-13 | Stop reason: HOSPADM

## 2025-02-11 RX ORDER — AMOXICILLIN 250 MG
2 CAPSULE ORAL 2 TIMES DAILY PRN
Status: DISCONTINUED | OUTPATIENT
Start: 2025-02-11 | End: 2025-02-13 | Stop reason: HOSPADM

## 2025-02-11 RX ORDER — DILTIAZEM HYDROCHLORIDE 5 MG/ML
10 INJECTION INTRAVENOUS ONCE
Status: COMPLETED | OUTPATIENT
Start: 2025-02-11 | End: 2025-02-11

## 2025-02-11 RX ORDER — IOPAMIDOL 612 MG/ML
100 INJECTION, SOLUTION INTRAVASCULAR
Status: COMPLETED | OUTPATIENT
Start: 2025-02-11 | End: 2025-02-11

## 2025-02-11 RX ORDER — DIATRIZOATE MEGLUMINE AND DIATRIZOATE SODIUM 660; 100 MG/ML; MG/ML
45 SOLUTION ORAL; RECTAL
Status: COMPLETED | OUTPATIENT
Start: 2025-02-11 | End: 2025-02-11

## 2025-02-11 RX ORDER — ACETAMINOPHEN 160 MG/5ML
650 SOLUTION ORAL EVERY 4 HOURS PRN
Status: DISCONTINUED | OUTPATIENT
Start: 2025-02-11 | End: 2025-02-13 | Stop reason: HOSPADM

## 2025-02-11 RX ORDER — HYDROCHLOROTHIAZIDE 25 MG/1
25 TABLET ORAL DAILY
COMMUNITY
End: 2025-02-13 | Stop reason: HOSPADM

## 2025-02-11 RX ORDER — SODIUM CHLORIDE 0.9 % (FLUSH) 0.9 %
10 SYRINGE (ML) INJECTION AS NEEDED
Status: DISCONTINUED | OUTPATIENT
Start: 2025-02-11 | End: 2025-02-13 | Stop reason: HOSPADM

## 2025-02-11 RX ORDER — LORAZEPAM 1 MG/1
1 TABLET ORAL
Status: DISCONTINUED | OUTPATIENT
Start: 2025-02-11 | End: 2025-02-13 | Stop reason: HOSPADM

## 2025-02-11 RX ORDER — LORAZEPAM 2 MG/ML
2 INJECTION INTRAMUSCULAR
Status: DISCONTINUED | OUTPATIENT
Start: 2025-02-11 | End: 2025-02-13 | Stop reason: HOSPADM

## 2025-02-11 RX ORDER — ENOXAPARIN SODIUM 150 MG/ML
1 INJECTION SUBCUTANEOUS EVERY 12 HOURS
Status: DISCONTINUED | OUTPATIENT
Start: 2025-02-12 | End: 2025-02-13 | Stop reason: HOSPADM

## 2025-02-11 RX ORDER — BISACODYL 10 MG
10 SUPPOSITORY, RECTAL RECTAL DAILY PRN
Status: DISCONTINUED | OUTPATIENT
Start: 2025-02-11 | End: 2025-02-13 | Stop reason: HOSPADM

## 2025-02-11 RX ORDER — THIAMINE HYDROCHLORIDE 100 MG/ML
200 INJECTION, SOLUTION INTRAMUSCULAR; INTRAVENOUS EVERY 8 HOURS SCHEDULED
Status: DISCONTINUED | OUTPATIENT
Start: 2025-02-11 | End: 2025-02-13 | Stop reason: HOSPADM

## 2025-02-11 RX ORDER — LORAZEPAM 2 MG/ML
1 INJECTION INTRAMUSCULAR
Status: DISCONTINUED | OUTPATIENT
Start: 2025-02-11 | End: 2025-02-13 | Stop reason: HOSPADM

## 2025-02-11 RX ORDER — ACETAMINOPHEN 650 MG/1
650 SUPPOSITORY RECTAL EVERY 4 HOURS PRN
Status: DISCONTINUED | OUTPATIENT
Start: 2025-02-11 | End: 2025-02-13 | Stop reason: HOSPADM

## 2025-02-11 RX ORDER — SODIUM CHLORIDE 9 MG/ML
100 INJECTION, SOLUTION INTRAVENOUS CONTINUOUS
Status: DISPENSED | OUTPATIENT
Start: 2025-02-11 | End: 2025-02-11

## 2025-02-11 RX ORDER — ATORVASTATIN CALCIUM 10 MG/1
10 TABLET, FILM COATED ORAL DAILY
Status: DISCONTINUED | OUTPATIENT
Start: 2025-02-11 | End: 2025-02-13 | Stop reason: HOSPADM

## 2025-02-11 RX ORDER — SODIUM CHLORIDE 9 MG/ML
40 INJECTION, SOLUTION INTRAVENOUS AS NEEDED
Status: DISCONTINUED | OUTPATIENT
Start: 2025-02-11 | End: 2025-02-13 | Stop reason: HOSPADM

## 2025-02-11 RX ORDER — LORAZEPAM 1 MG/1
2 TABLET ORAL
Status: DISCONTINUED | OUTPATIENT
Start: 2025-02-11 | End: 2025-02-13 | Stop reason: HOSPADM

## 2025-02-11 RX ORDER — ENOXAPARIN SODIUM 150 MG/ML
1 INJECTION SUBCUTANEOUS ONCE
Status: COMPLETED | OUTPATIENT
Start: 2025-02-11 | End: 2025-02-11

## 2025-02-11 RX ORDER — ONDANSETRON 2 MG/ML
4 INJECTION INTRAMUSCULAR; INTRAVENOUS EVERY 6 HOURS PRN
Status: DISCONTINUED | OUTPATIENT
Start: 2025-02-11 | End: 2025-02-13 | Stop reason: HOSPADM

## 2025-02-11 RX ORDER — LISINOPRIL 20 MG/1
40 TABLET ORAL DAILY
Status: DISCONTINUED | OUTPATIENT
Start: 2025-02-11 | End: 2025-02-13 | Stop reason: HOSPADM

## 2025-02-11 RX ORDER — ACETAMINOPHEN 325 MG/1
650 TABLET ORAL EVERY 4 HOURS PRN
Status: DISCONTINUED | OUTPATIENT
Start: 2025-02-11 | End: 2025-02-13 | Stop reason: HOSPADM

## 2025-02-11 RX ORDER — DILTIAZEM HCL IN NACL,ISO-OSM 125 MG/125
5-15 PLASTIC BAG, INJECTION (ML) INTRAVENOUS
Status: DISCONTINUED | OUTPATIENT
Start: 2025-02-11 | End: 2025-02-12

## 2025-02-11 RX ORDER — BISACODYL 5 MG/1
5 TABLET, DELAYED RELEASE ORAL DAILY PRN
Status: DISCONTINUED | OUTPATIENT
Start: 2025-02-11 | End: 2025-02-13 | Stop reason: HOSPADM

## 2025-02-11 RX ORDER — POLYETHYLENE GLYCOL 3350 17 G/17G
17 POWDER, FOR SOLUTION ORAL DAILY PRN
Status: DISCONTINUED | OUTPATIENT
Start: 2025-02-11 | End: 2025-02-13 | Stop reason: HOSPADM

## 2025-02-11 RX ORDER — SODIUM CHLORIDE 0.9 % (FLUSH) 0.9 %
10 SYRINGE (ML) INJECTION EVERY 12 HOURS SCHEDULED
Status: DISCONTINUED | OUTPATIENT
Start: 2025-02-11 | End: 2025-02-13 | Stop reason: HOSPADM

## 2025-02-11 RX ADMIN — IOPAMIDOL 100 ML: 612 INJECTION, SOLUTION INTRAVENOUS at 08:43

## 2025-02-11 RX ADMIN — THIAMINE HYDROCHLORIDE 200 MG: 100 INJECTION, SOLUTION INTRAMUSCULAR; INTRAVENOUS at 22:29

## 2025-02-11 RX ADMIN — DIATRIZOATE MEGLUMINE AND DIATRIZOATE SODIUM 45 ML: 660; 100 LIQUID ORAL; RECTAL at 08:43

## 2025-02-11 RX ADMIN — ENOXAPARIN SODIUM 120 MG: 120 INJECTION SUBCUTANEOUS at 15:04

## 2025-02-11 RX ADMIN — SODIUM CHLORIDE 100 ML/HR: 9 INJECTION, SOLUTION INTRAVENOUS at 19:35

## 2025-02-11 RX ADMIN — DILTIAZEM HYDROCHLORIDE 10 MG: 5 INJECTION, SOLUTION INTRAVENOUS at 15:04

## 2025-02-11 RX ADMIN — DILTIAZEM HYDROCHLORIDE 5 MG/HR: 5 INJECTION INTRAVENOUS at 15:29

## 2025-02-11 NOTE — ED PROVIDER NOTES
Subjective   History of Present Illness  Patient is 74 years old who was in preop and noted to be in A-fib.  He is got history of dysplastic colon polyp which was scheduled to be removed came to the ED for A-fib which is new onset.  Does not any history of A-fib in the past.  On anticoagulation.  No fever.  Denies any chest pain associate with this.  He has a history of some shortness of air which is chronic.    Palpitations  Palpitations quality:  Irregular  Onset quality:  Insidious  Timing:  Constant  Progression:  Worsening  Chronicity:  New  Context: not anxiety, not appetite suppressants, not blood loss, not dehydration, not exercise, not nicotine and not stimulant use    Relieved by:  Nothing  Worsened by:  Nothing  Ineffective treatments:  None tried  Associated symptoms: no back pain, no chest pain, no chest pressure, no cough, no diaphoresis, no dizziness, no lower extremity edema, no malaise/fatigue, no nausea, no near-syncope, no numbness, no PND, no shortness of breath, no syncope, no vomiting and no weakness    Risk factors: no diabetes mellitus, no heart disease, no hx of atrial fibrillation, no hx of PE and no hypercoagulable state        Review of Systems   Constitutional: Negative.  Negative for chills, diaphoresis, fatigue, fever and malaise/fatigue.   HENT: Negative.  Negative for congestion.    Respiratory: Negative.  Negative for cough, chest tightness, shortness of breath and stridor.    Cardiovascular: Negative.  Positive for palpitations. Negative for chest pain, syncope, PND and near-syncope.   Gastrointestinal: Negative.  Negative for abdominal distention, abdominal pain, nausea and vomiting.   Endocrine: Negative.    Genitourinary: Negative.  Negative for difficulty urinating and flank pain.   Musculoskeletal: Negative.  Negative for back pain.   Skin: Negative.  Negative for color change.   Neurological: Negative.  Negative for dizziness, weakness, numbness and headaches.   All other  systems reviewed and are negative.      Past Medical History:   Diagnosis Date    Abnormal ECG     Adenomatous colon polyp     POSITIVE FOR CANCER AND REMOVED VIA COLONOSCOPY IN     Arrhythmia 25    Pre-op    Asthma     Upon exertion    Cancer     1 colon polyp    Colon polyp     High cholesterol     Hypertension     New onset a-fib 2025    WITH RVR    Tubular adenoma of colon 2025    of the ascending colon with high grade dysplasia       No Known Allergies    Past Surgical History:   Procedure Laterality Date    COLONOSCOPY         Family History   Problem Relation Age of Onset    Hypertension Mother        Social History     Socioeconomic History    Marital status:    Tobacco Use    Smoking status: Former     Current packs/day: 0.00     Average packs/day: 1.5 packs/day for 40.0 years (60.0 ttl pk-yrs)     Types: Cigarettes     Start date:      Quit date:      Years since quittin.1     Passive exposure: Never    Smokeless tobacco: Never   Vaping Use    Vaping status: Never Used   Substance and Sexual Activity    Alcohol use: Yes     Alcohol/week: 24.0 standard drinks of alcohol     Types: 24 Cans of beer per week     Comment: DAILY BEER    Drug use: Never    Sexual activity: Defer           Objective   Physical Exam  Vitals and nursing note reviewed. Exam conducted with a chaperone present.   Constitutional:       General: He is not in acute distress.     Appearance: Normal appearance. He is well-developed. He is not toxic-appearing.   HENT:      Head: Normocephalic and atraumatic.      Nose: Nose normal.      Mouth/Throat:      Mouth: Mucous membranes are moist.      Pharynx: Uvula midline.   Eyes:      General: Lids are normal. Lids are everted, no foreign bodies appreciated.      Conjunctiva/sclera: Conjunctivae normal.      Pupils: Pupils are equal, round, and reactive to light.   Neck:      Vascular: Normal carotid pulses. No carotid bruit or JVD.      Trachea:  Trachea and phonation normal. No tracheal deviation.   Cardiovascular:      Rate and Rhythm: Tachycardia present. Rhythm irregular.      Chest Wall: PMI is not displaced.      Pulses: Normal pulses.      Heart sounds: Normal heart sounds.      No gallop.   Pulmonary:      Effort: Pulmonary effort is normal. No tachypnea, accessory muscle usage or respiratory distress.      Breath sounds: Normal breath sounds. No stridor. No decreased breath sounds, wheezing, rhonchi or rales.   Abdominal:      General: Bowel sounds are normal. There is no distension.      Palpations: Abdomen is soft.      Tenderness: There is no abdominal tenderness.   Musculoskeletal:         General: No swelling, tenderness or deformity. Normal range of motion.      Cervical back: Full passive range of motion without pain, normal range of motion and neck supple. No rigidity.      Right lower leg: No edema.      Left lower leg: No edema.      Comments: Lower extremity exam bilaterally is unremarkable.  There is no right or left calf tenderness .  There is no palpable venous cord.  No obvious difference in the size of the legs.  No pitting edema.  The dorsalis pedis and posterior tibial femoral and popliteal pulses are palpable and +2 bilaterally.  Homans sign is negative   Skin:     General: Skin is warm and dry.      Capillary Refill: Capillary refill takes less than 2 seconds.      Coloration: Skin is not jaundiced or pale.      Nails: There is no clubbing.   Neurological:      General: No focal deficit present.      Mental Status: He is alert and oriented to person, place, and time.      GCS: GCS eye subscore is 4. GCS verbal subscore is 5. GCS motor subscore is 6.      Cranial Nerves: No cranial nerve deficit.      Motor: Motor function is intact.      Gait: Gait normal.      Deep Tendon Reflexes: Reflexes are normal and symmetric. Reflexes normal.   Psychiatric:         Speech: Speech normal.         Behavior: Behavior normal.          Procedures           ED Course  ED Course as of 02/11/25 1606   Tue Feb 11, 2025   1604 Patient came in A-fib with RVR will place on Cardizem rate is controlled was given Lovenox Kike Vascor will be 2.  The patient will be admitted. [TS]      ED Course User Index  [TS] Mika Chen MD              [id number] Score: 3                                          Medical Decision Making  A-fib with RVR.    Problems Addressed:  Atrial fibrillation with rapid ventricular response: acute illness or injury    Amount and/or Complexity of Data Reviewed  Labs: ordered.     Details: Labs reviewed  Radiology: ordered.     Details: Labs reviewed x-rays reviewed  ECG/medicine tests: ordered.     Details: A-fib with RVR  Discussion of management or test interpretation with external provider(s): This with the hospitalist    Risk  Prescription drug management.  Decision regarding hospitalization.  Risk Details: Patient rate is better controlled with Cardizem will be admitted to the medicine service for        Final diagnoses:   Atrial fibrillation with rapid ventricular response       ED Disposition  ED Disposition       ED Disposition   Decision to Admit    Condition   --    Comment   Level of Care: Telemetry [5]   Diagnosis: Atrial fibrillation with rapid ventricular response [[id number]]   Admitting Physician: MISA DIXON [[id number]]   Attending Physician: MISA DIXON [[id number]]   Certification: I Certify That Inpatient Hospital Services Are Medically Necessary For Greater Than 2 Midnights                 No follow-up provider specified.       Medication List      No changes were made to your prescriptions during this visit.            Mika Chen MD  02/11/25 1447       Mika Chen MD  02/11/25 1606

## 2025-02-11 NOTE — ED NOTES
MEDICAL SCREENING:    Reason for Visit: Patient is a 74 year old male who presents to the ED with complaint of rapid heart rate.  States that he was in preop today because he is having a colectomy in the morning at 8:00.  States that he was getting his vitals done and EKG done and noticed that patient was in a flutter.  Patient was sent to the emergency department for further evaluation.  Patient does not have a history of A-fib.  He denies that he has any symptoms.  Denies shortness of breath at rest, denies palpitation, denies chest pain.  Does report dyspnea exertion states that it has been going on for about a year or 2.  He does not have any cardiac history to his knowledge.  He has been nauseated, but did have to drink contrast today for pre op testing, thinks this is why. Denies fever, vomiting, chills, congestion, or any other symptoms.   Heart rate in triage 148 in triage.   Non Ill-appearing, nontoxic appearing, no distress noted.      Patient initially seen in triage.  The patient was advised further evaluation and diagnostic testing will be needed, some of the treatment and testing will be initiated in the lobby in order to begin the process.  The patient will be returned to the waiting area for the time being and possibly be re-assessed by a subsequent ED provider.  The patient will be brought back to the treatment area in as timely manner as possible.      Maria Esther Wallace, APRN  02/11/25 [id number]

## 2025-02-11 NOTE — PROGRESS NOTES
Reason for Visit: Atrial fibrillation with RVR.    HPI:  Kodi Stark is a 74 y.o. male is being seen for consultation today at the request of Kodi Cadena MD for assistance with management of atrial fibrillation with RVR.  EKG today showed newly diagnosed atrial fibrillation with RVR.  He was recently seen in general surgery clinic by Dr. Cadena on 2025 due to tubular adenoma with high-grade dysplasia in the ascending colon that was discovered on colonoscopy.  He had a preoperative EKG done today showing atrial fibrillation with RVR.  He was therefore referred to cardiology for urgent preoperative evaluation.  EKG was repeated and now shows atrial flutter with RVR.  He denies any significant palpitations.  He does report some shortness of breath.  His daughter is worried that he is getting dehydrated as he has not been eating or drinking good recently.    Previous Cardiac Testing and Procedures:  -EKG (2025) atrial fibrillation with RVR    Patient Active Problem List   Diagnosis    Tubular adenoma of colon    High grade dysplasia in colonic adenoma       Social History     Tobacco Use    Smoking status: Former     Current packs/day: 0.00     Average packs/day: 1.5 packs/day for 40.0 years (60.0 ttl pk-yrs)     Types: Cigarettes     Start date:      Quit date:      Years since quittin.1     Passive exposure: Never    Smokeless tobacco: Never   Vaping Use    Vaping status: Never Used   Substance Use Topics    Alcohol use: Yes     Alcohol/week: 24.0 standard drinks of alcohol     Types: 24 Cans of beer per week     Comment: DAILY BEER    Drug use: Never       Family History   Problem Relation Age of Onset    Hypertension Mother        The following portions of the patient's history were reviewed and updated as appropriate: allergies, current medications, past family history, past medical history, past social history, past surgical history, and problem list.      Current Outpatient Medications:  "    lisinopril (PRINIVIL,ZESTRIL) 40 MG tablet, Take 1 tablet by mouth Daily., Disp: , Rfl:     simvastatin (ZOCOR) 10 MG tablet, Take 2 tablets by mouth Daily., Disp: , Rfl:   No current facility-administered medications for this visit.    Review of Systems   Constitutional: Positive for decreased appetite. Negative for chills and fever.   Cardiovascular:  Positive for dyspnea on exertion. Negative for chest pain and paroxysmal nocturnal dyspnea.   Respiratory:  Positive for shortness of breath. Negative for cough.    Skin:  Negative for rash.   Gastrointestinal:  Negative for abdominal pain and heartburn.   Neurological:  Negative for dizziness and numbness.       Objective   /90 (BP Location: Left arm, Patient Position: Sitting, Cuff Size: Adult)   Pulse (!) 148   Ht 182.9 cm (72\")   Wt 113 kg (250 lb)   BMI 33.91 kg/m²   Constitutional:       Appearance: Well-developed.   HENT:      Head: Normocephalic and atraumatic.   Pulmonary:      Effort: Pulmonary effort is normal.      Breath sounds: Normal breath sounds.   Cardiovascular:      Tachycardia present. Regular rhythm.   Edema:     Peripheral edema absent.   Skin:     General: Skin is warm and dry.   Neurological:      Mental Status: Alert and oriented to person, place, and time.         ECG 12 Lead    Date/Time: 2/11/2025 1:23 PM  Performed by: Aroldo Monzon MD    Authorized by: Aroldo Monzon MD  Comparison: compared with previous ECG from 2/11/2024  Comparison to previous ECG: A-flutter has replaced a-fib  Rhythm: atrial flutter  Rate: tachycardic  QRS axis: right            ICD-10-CM ICD-9-CM   1. Atrial flutter with rapid ventricular response  I48.92 427.32   2. Atrial fibrillation, unspecified type  I48.91 427.31   3. Essential hypertension  I10 401.9   4. Tubular adenoma of colon  D12.6 211.3   5. Preoperative evaluation to rule out surgical contraindication  Z01.818 V72.83         Assessment/Plan:  1.  Atrial flutter with RVR: EKG " earlier today showed atrial fibrillation with RVR.  Current EKG shows atrial flutter with 2-1 AV conduction.  EKG earlier today showed atrial fibrillation with RVR.  Given the difficulties in rate controlling atrial flutter, patient will require inpatient admission for IV rate control as well as an echocardiogram.  He was therefore sent to the emergency room and the case was discussed with the emergency room provider.    2.  Essential hypertension: Blood pressure is mildly elevated today.  He will require IV AV leo blocking drugs for rate control, which should help blood pressure control.    3.  Tubular adenoma of the colon with high-grade dysplasia: Planning on surgery, that is tentatively scheduled for tomorrow with Dr. Cadena.    4.  Preoperative evaluation: Patient is an overall increase surgical risk given current uncontrolled atrial flutter with RVR.  Recommend adequate rate control of arrhythmia prior to surgery unless it is considered emergent.        Medical decision making:  Number and complexity of problems addressed: High complexity given acute illness that may pose a threat to life or bodily function in atrial flutter with RVR.  Amount and/or complexity of data reviewed: High complexity given independently reviewed and interpreted the EKG done earlier today during preoperative appointment.  Discussed management with the emergency room provider.  Risk of complications and/or morbidity: High complexity given decision regarding hospitalization.

## 2025-02-11 NOTE — DISCHARGE INSTRUCTIONS
Preparing for Surgery  Follow these instructions before the procedure:  Several days or weeks before your procedure      Ask your health care provider about:  Changing or stopping your regular medicines. This is especially important if you are taking diabetes medicines or blood thinners.  Taking medicines such as aspirin and ibuprofen. These medicines can thin your blood. Do not take these medicines unless your health care provider tells you to take them.  Taking over-the-counter medicines, vitamins, herbs, and supplements.    Contact your surgeon if you:  Develop a fever of more than 100.4°F (38°C) or other feelings of illness during the 48 hours before your surgery.  Have symptoms that get worse.  Have questions or concerns about your surgery.  If you are going home the same day of your surgery you will need to arrange for a responsible adult, age 18 years old or older, to drive you home from the hospital and stay with you for 24 hours. Verification of the  will be made prior to any procedure requiring sedation. You may not go home in a taxi or any form of public transportation by yourself.     Day before your procedure  Medication(s) you need to stop the day before your surgery: DO NOT TAKE LISINOPRIL FOR 24 HOURS PRIOR TO SURGERY    24 hours before your procedure DO NOT drink alcoholic beverages or smoke.  24 hours before your procedure STOP taking Erectile Dysfunction medication (i.e.,Cialis, Viagra)   You may be asked to shower with a germ-killing soap.  Day of your procedure   You may take the following medication(s) the morning of surgery with a sip of water: ONLY WHAT YOUR PROVIDER HAS INSTRUCTED YOU TO TAKE      8 hours before your scheduled arrival time, STOP all food, any dairy products, and full liquids. This includes hard candy, chewing gum or mints. This is extremely important to prevent serious complications.     Up to 2 hours before your scheduled arrival time, you may have clear liquids no  cream, powder, or pulp of any kind. Safe options are water, black coffee, plain tea, soda, Gatorade/Powerade, clear broth, apple juice.    2 hours before your scheduled arrival time, STOP drinking clear liquids.    You may need to take another shower with a germ-killing soap before you leave home in the morning. Do not use perfumes, colognes, or body lotions.  Wear comfortable loose-fitting clothing.  Remove all jewelry including body piercing and rings, dark colored nail polish, and make up prior to arrival at the hospital. Leave all valuables at home.   Bring your hearing aids if you rely on them.  Do not wear contact lenses. If you wear eyeglasses remember to bring a case to store them in while you are in surgery.  Do not use denture adhesives since you will be asked to remove them during your surgery.    You do not need to bring your home medications into the hospital.   Bring your sleep apnea device with you on the day of your surgery (if this applies to you).  If you have an Inspire implant for sleep apnea, please bring the remote with you on the day of surgery.  If you wear portable oxygen, bring it with you.   If you are staying overnight, you may bring a bag of items you may need such as slippers, robe and a change of clothes for your discharge. You may want to leave these items in the car until you are ready for them since your family will take your belongings when you leave the pre-operative area.  Arrive at the hospital as scheduled by the office. You will be asked to arrive 2 hours prior to your surgery time in order to prepare for your procedure.  When you arrive at the hospital  Go to the registration desk located at the main entrance of the hospital.  After registration is completed, you will be given a beeper and a sticker sheet. Take the stickers to Outpatient Surgery and place in the tray at the end of the desk to notify the staff that you have arrived and registered.   Return to the lobby to  wait. You are not always called back according to the time of arrival but rather the time your doctor will be ready.  When your beeper lights up and vibrates proceed through the double doors, under the stairs, and a member of the Outpatient Surgery staff will escort you to your preoperative room.     How to Use Chlorhexidine Before Surgery  Chlorhexidine gluconate (CHG) is a germ-killing (antiseptic) solution that is used to clean the skin. It can get rid of the bacteria that normally live on the skin and can keep them away for about 24 hours. To clean your skin with CHG, you may be given:  A CHG solution to use in the shower or as part of a sponge bath.  A prepackaged cloth that contains CHG.  Cleaning your skin with CHG may help lower the risk for infection:  While you are staying in the intensive care unit of the hospital.  If you have a vascular access, such as a central line, to provide short-term or long-term access to your veins.  If you have a catheter to drain urine from your bladder.  If you are on a ventilator. A ventilator is a machine that helps you breathe by moving air in and out of your lungs.  After surgery.  What are the risks?  Risks of using CHG include:  A skin reaction.  Hearing loss, if CHG gets in your ears and you have a perforated eardrum.  Eye injury, if CHG gets in your eyes and is not rinsed out.  The CHG product catching fire.  Make sure that you avoid smoking and flames after applying CHG to your skin.  Do not use CHG:  If you have a chlorhexidine allergy or have previously reacted to chlorhexidine.  On babies younger than 2 months of age.  How to use CHG solution  Use CHG only as told by your health care provider, and follow the instructions on the label.  Use the full amount of CHG as directed. Usually, this is one bottle.  During a shower    Follow these steps when using CHG solution during a shower (unless your health care provider gives you different instructions):  Start the  shower.  Use your normal soap and shampoo to wash your face and hair.  Turn off the shower or move out of the shower stream.  Pour the CHG onto a clean washcloth. Do not use any type of brush or rough-edged sponge.  Starting at your neck, lather your body down to your toes. Make sure you follow these instructions:  If you will be having surgery, pay special attention to the part of your body where you will be having surgery. Scrub this area for at least 1 minute.  Do not use CHG on your head or face. If the solution gets into your ears or eyes, rinse them well with water.  Avoid your genital area.  Avoid any areas of skin that have broken skin, cuts, or scrapes.  Scrub your back and under your arms. Make sure to wash skin folds.  Let the lather sit on your skin for 1-2 minutes or as long as told by your health care provider.  Thoroughly rinse your entire body in the shower. Make sure that all body creases and crevices are rinsed well.  Dry off with a clean towel. Do not put any substances on your body afterward--such as powder, lotion, or perfume--unless you are told to do so by your health care provider. Only use lotions that are recommended by the .  Put on clean clothes or pajamas.  If it is the night before your surgery, sleep in clean sheets.     During a sponge bath  Follow these steps when using CHG solution during a sponge bath (unless your health care provider gives you different instructions):  Use your normal soap and shampoo to wash your face and hair.  Pour the CHG onto a clean washcloth.  Starting at your neck, lather your body down to your toes. Make sure you follow these instructions:  If you will be having surgery, pay special attention to the part of your body where you will be having surgery. Scrub this area for at least 1 minute.  Do not use CHG on your head or face. If the solution gets into your ears or eyes, rinse them well with water.  Avoid your genital area.  Avoid any areas of  skin that have broken skin, cuts, or scrapes.  Scrub your back and under your arms. Make sure to wash skin folds.  Let the lather sit on your skin for 1-2 minutes or as long as told by your health care provider.  Using a different clean, wet washcloth, thoroughly rinse your entire body. Make sure that all body creases and crevices are rinsed well.  Dry off with a clean towel. Do not put any substances on your body afterward--such as powder, lotion, or perfume--unless you are told to do so by your health care provider. Only use lotions that are recommended by the .  Put on clean clothes or pajamas.  If it is the night before your surgery, sleep in clean sheets.  How to use CHG prepackaged cloths  Only use CHG cloths as told by your health care provider, and follow the instructions on the label.  Use the CHG cloth on clean, dry skin.  Do not use the CHG cloth on your head or face unless your health care provider tells you to.  When washing with the CHG cloth:  Avoid your genital area.  Avoid any areas of skin that have broken skin, cuts, or scrapes.  Before surgery    Follow these steps when using a CHG cloth to clean before surgery (unless your health care provider gives you different instructions):  Using the CHG cloth, vigorously scrub the part of your body where you will be having surgery. Scrub using a back-and-forth motion for 3 minutes. The area on your body should be completely wet with CHG when you are done scrubbing.  Do not rinse. Discard the cloth and let the area air-dry. Do not put any substances on the area afterward, such as powder, lotion, or perfume.  Put on clean clothes or pajamas.  If it is the night before your surgery, sleep in clean sheets.     For general bathing  Follow these steps when using CHG cloths for general bathing (unless your health care provider gives you different instructions).  Use a separate CHG cloth for each area of your body. Make sure you wash between any folds  of skin and between your fingers and toes. Wash your body in the following order, switching to a new cloth after each step:  The front of your neck, shoulders, and chest.  Both of your arms, under your arms, and your hands.  Your stomach and groin area, avoiding the genitals.  Your right leg and foot.  Your left leg and foot.  The back of your neck, your back, and your buttocks.  Do not rinse. Discard the cloth and let the area air-dry. Do not put any substances on your body afterward--such as powder, lotion, or perfume--unless you are told to do so by your health care provider. Only use lotions that are recommended by the .  Put on clean clothes or pajamas.  Contact a health care provider if:  Your skin gets irritated after scrubbing.  You have questions about using your solution or cloth.  You swallow any chlorhexidine. Call your local poison control center (1-369.574.4301 in the [place]  Get help right away if:  Your eyes itch badly, or they become very red or swollen.  Your skin itches badly and is red or swollen.  Your hearing changes.  You have trouble seeing.  You have swelling or tingling in your mouth or throat.  You have trouble breathing.  These symptoms may represent a serious problem that is an emergency. Do not wait to see if the symptoms will go away. Get medical help right away. Call your local emergency services (018 in the [place] Do not drive yourself to the hospital.  Summary  Chlorhexidine gluconate (CHG) is a germ-killing (antiseptic) solution that is used to clean the skin. Cleaning your skin with CHG may help to lower your risk for infection.  You may be given CHG to use for bathing. It may be in a bottle or in a prepackaged cloth to use on your skin. Carefully follow your health care provider's instructions and the instructions on the product label.  Do not use CHG if you have a chlorhexidine allergy.  Contact your health care provider if your skin gets irritated after  scrubbing.  This information is not intended to replace advice given to you by your health care provider. Make sure you discuss any questions you have with your health care provider.  Document Revised: 04/17/2023 Document Reviewed: 02/28/2022  [employer] Patient Education © 2023 [employer]

## 2025-02-11 NOTE — TELEPHONE ENCOUNTER
Called patients daughter and let her know we were canceling off surgery since he is in the ER. We will talk about rescheduling once he's converted out of afib or Dr. Cadena may do his procedure while he is admitted.  She voiced understanding.

## 2025-02-11 NOTE — PAT
EKG at [facility] today showed new onset A-fib with RVR heart rate 130. Patient is completely asymptomatic he states. I called and talked to Dr. Wei and he states patient will need cardiac clearance and if he becomes symptomatic he needs to go to ER immediately. I spoke to Dr. Cadena's MA and she states she will message Dr. Cadena. Before the patient left [facility] we received a call from cardiology and they were able to schedule him for an appointment at 1 pm today.

## 2025-02-11 NOTE — H&P
"    [facility] Medicine Services  HISTORY AND PHYSICAL    Date of Admission: 2/11/2025  Primary Care Physician: Holly Montoya MD    Subjective   Primary Historian: Patient with daughters    Chief Complaint: Rapid heart rate    History of Present Illness  Patient was transferred to ER for further rate control of atrial flutter as well as echocardiogram.    Patient since in the emergency room had been started on Cardizem 10 mg IV followed by Cardizem drip.    Most recent heart rate is 98  With blood pressure of 114/68  Troponins are negative x 2  Patient is mildly hyponatremic at 132 with chloride of 93  Magnesium 2.1      Patient is feeling \"like normal\"  Denies any chest pain shortness of breath or difficulty breathing  Denies any dizziness lightheadedness near syncopal episode.  He has not eaten for a while because he is fasting for anticipated surgery.  He told me that he had colonoscopy on February 3 and has an ulcerated polyp that is best to be removed.  He was supposed to undergo the surgery today but had A-fib with rapid ventricular response.  There was an urgent consultation with cardiology in the clinic.  Dr. Monzon sent this patient to the emergency room.  Patient has been rate controlled since being on Cardizem at 10 mg/h but maintaining atrial flutter.    His last beer drink was about 3 to 5 days ago.  He drinks about every day.  He drinks about 2-4 beers per episode or day.  He only carries history of hypertension and hyperlipidemia  Review of Systems   Otherwise complete ROS reviewed and negative except as mentioned in the HPI.    Past Medical History:   Past Medical History:   Diagnosis Date    Abnormal ECG     Adenomatous colon polyp 2023    POSITIVE FOR CANCER AND REMOVED VIA COLONOSCOPY IN 2023    Arrhythmia 2/11/25    Pre-op    Asthma     Upon exertion    Cancer 5/23    1 colon polyp    Colon polyp     High cholesterol     Hypertension     New onset a-fib " "02/11/2025    WITH RVR    Tubular adenoma of colon 02/04/2025    of the ascending colon with high grade dysplasia     Past Surgical History:  Past Surgical History:   Procedure Laterality Date    COLONOSCOPY       Social History:  reports that he quit smoking about 20 years ago. His smoking use included cigarettes. He started smoking about 60 years ago. He has a 60 pack-year smoking history. He has never been exposed to tobacco smoke. He has never used smokeless tobacco. He reports current alcohol use of about 24.0 standard drinks of alcohol per week. He reports that he does not use drugs.    Family History: family history includes Hypertension in his mother.       Allergies:  No Known Allergies    Medications:  Prior to Admission medications    Medication Sig Start Date End Date Taking? Authorizing Provider   lisinopril (PRINIVIL,ZESTRIL) 40 MG tablet Take 1 tablet by mouth Daily.   Yes ProviderJohnathan MD   simvastatin (ZOCOR) 10 MG tablet Take 2 tablets by mouth Daily.   Yes Provider, MD Johnathan     I have utilized all available immediate resources to obtain, update, or review the patient's current medications (including all prescriptions, over-the-counter products, herbals, cannabis/cannabidiol products, and vitamin/mineral/dietary (nutritional) supplements).    Objective     Vital Signs: /68   Pulse 98   Temp 97.9 °F (36.6 °C)   Resp 20   Ht 182.9 cm (72\")   Wt 113 kg (250 lb)   SpO2 95%   BMI 33.91 kg/m²   Physical Exam   GEN: Awake, alert, interactive, in NAD  HEENT: Atraumatic, PERRLA, EOMI, Anicteric, Trachea midline  Lungs: CTAB, no wheezing/rales/rhonchi  Heart: RRR, +S1/s2, no rub  ABD: soft, nt/nd, +BS, no guarding/rebound  Extremities: atraumatic, no cyanosis, no edema  Skin: no rashes or lesions  Neuro: AAOx3, no focal deficits  Psych: normal mood & affect      Results Reviewed:  Lab Results (last 24 hours)       Procedure Component Value Units Date/Time    High Sensitivity " Troponin T 1Hr [[id number]]  (Normal) Collected: 02/11/25 1601    Specimen: Blood from Arm, Left Updated: 02/11/25 1630     HS Troponin T 16 ng/L      Troponin T Numeric Delta 1 ng/L     Narrative:      High Sensitive Troponin T Reference Range:  <14.0 ng/L- Negative Female for AMI  <22.0 ng/L- Negative Male for AMI  >=14 - Abnormal Female indicating possible myocardial injury.  >=22 - Abnormal Male indicating possible myocardial injury.   Clinicians would have to utilize clinical acumen, EKG, Troponin, and serial changes to determine if it is an Acute Myocardial Infarction or myocardial injury due to an underlying chronic condition.         Comprehensive Metabolic Panel [[id number]]  (Abnormal) Collected: 02/11/25 1447    Specimen: Blood from Arm, Left Updated: 02/11/25 1514     Glucose 101 mg/dL      BUN 11 mg/dL      Creatinine 0.71 mg/dL      Sodium 132 mmol/L      Potassium 4.2 mmol/L      Comment: Slight hemolysis detected by analyzer. Result may be falsely elevated.        Chloride 93 mmol/L      CO2 24.0 mmol/L      Calcium 9.9 mg/dL      Total Protein 8.5 g/dL      Albumin 4.5 g/dL      ALT (SGPT) 19 U/L      AST (SGOT) 25 U/L      Alkaline Phosphatase 48 U/L      Total Bilirubin 0.7 mg/dL      Globulin 4.0 gm/dL      A/G Ratio 1.1 g/dL      BUN/Creatinine Ratio 15.5     Anion Gap 15.0 mmol/L      eGFR 95.7 mL/min/1.73     Narrative:      GFR Categories in Chronic Kidney Disease (CKD)      GFR Category          GFR (mL/min/1.73)    Interpretation  G1                     90 or greater         Normal or high (1)  G2                      60-89                Mild decrease (1)  G3a                   45-59                Mild to moderate decrease  G3b                   30-44                Moderate to severe decrease  G4                    15-29                Severe decrease  G5                    14 or less           Kidney failure          (1)In the absence of evidence of kidney disease, neither GFR category G1  or G2 fulfill the criteria for CKD.    eGFR calculation 2021 CKD-EPI creatinine equation, which does not include race as a factor    Magnesium [[id number]]  (Normal) Collected: 02/11/25 1447    Specimen: Blood from Arm, Left Updated: 02/11/25 1514     Magnesium 2.1 mg/dL     High Sensitivity Troponin T [[id number]]  (Normal) Collected: 02/11/25 1447    Specimen: Blood from Arm, Left Updated: 02/11/25 1512     HS Troponin T 15 ng/L     Narrative:      High Sensitive Troponin T Reference Range:  <14.0 ng/L- Negative Female for AMI  <22.0 ng/L- Negative Male for AMI  >=14 - Abnormal Female indicating possible myocardial injury.  >=22 - Abnormal Male indicating possible myocardial injury.   Clinicians would have to utilize clinical acumen, EKG, Troponin, and serial changes to determine if it is an Acute Myocardial Infarction or myocardial injury due to an underlying chronic condition.         Protime-INR [[id number]]  (Normal) Collected: 02/11/25 1447    Specimen: Blood from Arm, Left Updated: 02/11/25 1504     Protime 14.0 Seconds      INR 1.02    aPTT [[id number]]  (Normal) Collected: 02/11/25 1447    Specimen: Blood from Arm, Left Updated: 02/11/25 1504     PTT 26.8 seconds     CBC & Differential [[id number]]  (Abnormal) Collected: 02/11/25 1447    Specimen: Blood from Arm, Left Updated: 02/11/25 1456    Narrative:      The following orders were created for panel order CBC & Differential.  Procedure                               Abnormality         Status                     ---------                               -----------         ------                     CBC Auto Differential[[id number]]        Abnormal            Final result                 Please view results for these tests on the individual orders.    CBC Auto Differential [[id number]]  (Abnormal) Collected: 02/11/25 1447    Specimen: Blood from Arm, Left Updated: 02/11/25 1456     WBC 7.26 10*3/mm3      RBC 5.36 10*6/mm3      Hemoglobin 16.9 g/dL      Hematocrit  49.3 %      MCV 92.0 fL      MCH 31.5 pg      MCHC 34.3 g/dL      RDW 12.6 %      RDW-SD 42.7 fl      MPV 10.0 fL      Platelets 323 10*3/mm3      Neutrophil % 72.1 %      Lymphocyte % 17.2 %      Monocyte % 9.6 %      Eosinophil % 0.3 %      Basophil % 0.4 %      Immature Grans % 0.4 %      Neutrophils, Absolute 5.23 10*3/mm3      Lymphocytes, Absolute 1.25 10*3/mm3      Monocytes, Absolute 0.70 10*3/mm3      Eosinophils, Absolute 0.02 10*3/mm3      Basophils, Absolute 0.03 10*3/mm3      Immature Grans, Absolute 0.03 10*3/mm3      nRBC 0.0 /100 WBC           Imaging Results (Last 24 Hours)       Procedure Component Value Units Date/Time    XR Chest 1 View [[id number]] Collected: 02/11/25 1558     Updated: 02/11/25 1603    Narrative:      EXAMINATION: XR CHEST 1 VW- 2/11/2025 3:58 PM     HISTORY: dyspnea.     REPORT: A frontal view of the chest was obtained.     COMPARISON: CT abdomen pelvis on the same day 0841 hours, which includes  the lung bases.     There is moderate volume loss in the lung bases, with central and  basilar vascular crowding. No lung consolidation is identified however.  Heart size is normal. There is no pneumothorax or pleural effusion. The  osseous structures are unremarkable.       Impression:      Moderate bibasilar atelectasis with associated vascular  crowding. No consolidating infiltrates, no evidence of CHF.     This report was signed and finalized on 2/11/2025 4:00 PM by Dr. Cassius Mcclendon MD.             I have personally reviewed and interpreted the radiology studies and ECG obtained at time of admission.     Assessment / Plan   Assessment:   Active Hospital Problems    Diagnosis     **Atrial fibrillation with rapid ventricular response            Medical Decision Making  Number and Complexity of problems:   Atrial fibrillation/flutter with rapid ventricular response  Hypertension  Mildly hyponatremic probably related to fasting  Daily alcohol consumption-patient at this time would  have been in withdrawal.  He does not have any tremors.  He has no gross agitation.  His heart rate is elevated.  Hyperlipidemia  History of colonic polyp    Treatment Plan  The patient will be admitted to my service here at [facility].  Will continue on Lovenox for now.  His age and hypertension gives him at least a score of 2.  Explained rationale on anticoagulation.  Explained possible adverse effect.  He has no history of bleeding.  Will prophylactically give thiamine and folic acid  Will give IV fluid x 250 cc  Okay to eat  Will consult EP in the morning.  All questions were answered to the best of my abilities.        Conditions and Status        Fair, stable     [facility] Data  External documents reviewed: Reviewed epic record  Cardiac tracing (EKG, telemetry) interpretation: Atrial flutter on telemetry  Now rate controlled on 10 mg/h of Cardizem  Radiology interpretation: Reviewed moderate bibasilar atelectasis with vascular crowding no evidence of infiltrates or evidence of CHF  CT of the abdomen pelvis reviewed as well.  Hypodense liver lesions too small to be characterized but likely cysts.  Labs reviewed: y  Any tests that were considered but not ordered: None     Decision rules/scores evaluated (example [id number], Wells, etc): -     Discussed with: Patient and daughters.  Discussed with Dr. Monzon     Care Planning  Shared decision making: Patient and family  Code status and discussions: Full code    Disposition  Social Determinants of Health that impact treatment or disposition: None identified at this time  Estimated length of stay is to be determined.     I confirmed that the patient's advanced care plan is present, code status is documented, and a surrogate decision maker is listed in the patient's medical record.     The patient's surrogate decision maker is family.     The patient was seen and examined by me on   Electronically signed by Hardik Garvey MD, 02/11/25, 17:00  CST.

## 2025-02-12 ENCOUNTER — APPOINTMENT (OUTPATIENT)
Dept: CARDIOLOGY | Facility: HOSPITAL | Age: 75
DRG: 309 | End: 2025-02-12
Payer: MEDICARE

## 2025-02-12 LAB
AV MEAN PRESS GRAD SYS DOP V1V2: 7.4 MMHG
AV VMAX SYS DOP: 198 CM/SEC
BH CV ECHO MEAS - AO MAX PG: 15.7 MMHG
BH CV ECHO MEAS - AO ROOT DIAM: 3.5 CM
BH CV ECHO MEAS - AO V2 VTI: 30.5 CM
BH CV ECHO MEAS - AVA(I,D): 2.07 CM2
BH CV ECHO MEAS - EDV(CUBED): 59.3 ML
BH CV ECHO MEAS - EDV(MOD-SP2): 48.4 ML
BH CV ECHO MEAS - EDV(MOD-SP4): 74.7 ML
BH CV ECHO MEAS - EF(MOD-SP2): 57.2 %
BH CV ECHO MEAS - EF(MOD-SP4): 64.1 %
BH CV ECHO MEAS - ESV(CUBED): 18.8 ML
BH CV ECHO MEAS - ESV(MOD-SP2): 20.7 ML
BH CV ECHO MEAS - ESV(MOD-SP4): 26.8 ML
BH CV ECHO MEAS - FS: 31.8 %
BH CV ECHO MEAS - IVS/LVPW: 0.98 CM
BH CV ECHO MEAS - IVSD: 1.2 CM
BH CV ECHO MEAS - LA DIMENSION: 4.3 CM
BH CV ECHO MEAS - LAT PEAK E' VEL: 11.5 CM/SEC
BH CV ECHO MEAS - LV DIASTOLIC VOL/BSA (35-75): 31.9 CM2
BH CV ECHO MEAS - LV MASS(C)D: 162.3 GRAMS
BH CV ECHO MEAS - LV MAX PG: 5.6 MMHG
BH CV ECHO MEAS - LV MEAN PG: 3 MMHG
BH CV ECHO MEAS - LV SYSTOLIC VOL/BSA (12-30): 11.5 CM2
BH CV ECHO MEAS - LV V1 MAX: 118.6 CM/SEC
BH CV ECHO MEAS - LV V1 VTI: 20.1 CM
BH CV ECHO MEAS - LVIDD: 3.9 CM
BH CV ECHO MEAS - LVIDS: 2.7 CM
BH CV ECHO MEAS - LVOT AREA: 3.1 CM2
BH CV ECHO MEAS - LVOT DIAM: 2 CM
BH CV ECHO MEAS - LVPWD: 1.23 CM
BH CV ECHO MEAS - MED PEAK E' VEL: 10 CM/SEC
BH CV ECHO MEAS - MV DEC SLOPE: 403 CM/SEC2
BH CV ECHO MEAS - MV DEC TIME: 0.29 SEC
BH CV ECHO MEAS - MV E MAX VEL: 118 CM/SEC
BH CV ECHO MEAS - PA V2 MAX: 94.1 CM/SEC
BH CV ECHO MEAS - RAP SYSTOLE: 3 MMHG
BH CV ECHO MEAS - RV MAX PG: 2.6 MMHG
BH CV ECHO MEAS - RV V1 MAX: 81.2 CM/SEC
BH CV ECHO MEAS - RVSP: 25.8 MMHG
BH CV ECHO MEAS - SV(LVOT): 63.1 ML
BH CV ECHO MEAS - SV(MOD-SP2): 27.7 ML
BH CV ECHO MEAS - SV(MOD-SP4): 47.9 ML
BH CV ECHO MEAS - SVI(LVOT): 27 ML/M2
BH CV ECHO MEAS - SVI(MOD-SP2): 11.8 ML/M2
BH CV ECHO MEAS - SVI(MOD-SP4): 20.5 ML/M2
BH CV ECHO MEAS - TAPSE (>1.6): 1.93 CM
BH CV ECHO MEAS - TR MAX PG: 22.8 MMHG
BH CV ECHO MEAS - TR MAX VEL: 239 CM/SEC
BH CV ECHO MEASUREMENTS AVERAGE E/E' RATIO: 10.98
BH CV XLRA - RV BASE: 4.3 CM
BH CV XLRA - RV LENGTH: 7.9 CM
BH CV XLRA - RV MID: 2.9 CM
LEFT ATRIUM VOLUME INDEX: 25.8 ML/M2
LEFT ATRIUM VOLUME: 60.3 ML
LV EF BIPLANE MOD: 60 %
QT INTERVAL: 314 MS
QT INTERVAL: 332 MS
QTC INTERVAL: 488 MS
QTC INTERVAL: 492 MS

## 2025-02-12 PROCEDURE — 25010000002 THIAMINE HCL 200 MG/2ML SOLUTION: Performed by: INTERNAL MEDICINE

## 2025-02-12 PROCEDURE — 25010000002 ENOXAPARIN PER 10 MG: Performed by: INTERNAL MEDICINE

## 2025-02-12 PROCEDURE — 93306 TTE W/DOPPLER COMPLETE: CPT

## 2025-02-12 PROCEDURE — 93306 TTE W/DOPPLER COMPLETE: CPT | Performed by: EMERGENCY MEDICINE

## 2025-02-12 RX ORDER — DILTIAZEM HYDROCHLORIDE 30 MG/1
60 TABLET, FILM COATED ORAL EVERY 6 HOURS SCHEDULED
Status: DISCONTINUED | OUTPATIENT
Start: 2025-02-12 | End: 2025-02-13

## 2025-02-12 RX ADMIN — ATORVASTATIN CALCIUM 10 MG: 10 TABLET, FILM COATED ORAL at 08:24

## 2025-02-12 RX ADMIN — THIAMINE HYDROCHLORIDE 200 MG: 100 INJECTION, SOLUTION INTRAMUSCULAR; INTRAVENOUS at 16:38

## 2025-02-12 RX ADMIN — LISINOPRIL 40 MG: 20 TABLET ORAL at 08:24

## 2025-02-12 RX ADMIN — ENOXAPARIN SODIUM 120 MG: 120 INJECTION SUBCUTANEOUS at 05:48

## 2025-02-12 RX ADMIN — FOLIC ACID 1 MG: 1 TABLET ORAL at 08:24

## 2025-02-12 RX ADMIN — THIAMINE HYDROCHLORIDE 200 MG: 100 INJECTION, SOLUTION INTRAMUSCULAR; INTRAVENOUS at 05:48

## 2025-02-12 RX ADMIN — ENOXAPARIN SODIUM 120 MG: 120 INJECTION SUBCUTANEOUS at 17:53

## 2025-02-12 RX ADMIN — Medication 10 ML: at 22:06

## 2025-02-12 RX ADMIN — DILTIAZEM HYDROCHLORIDE 60 MG: 30 TABLET, FILM COATED ORAL at 17:52

## 2025-02-12 RX ADMIN — Medication 10 ML: at 08:25

## 2025-02-12 RX ADMIN — DILTIAZEM HYDROCHLORIDE 60 MG: 30 TABLET, FILM COATED ORAL at 11:48

## 2025-02-12 RX ADMIN — THIAMINE HYDROCHLORIDE 200 MG: 100 INJECTION, SOLUTION INTRAMUSCULAR; INTRAVENOUS at 22:06

## 2025-02-12 NOTE — CONSULTS
"Pharmacy Dosing Service  Anticoagulant  Enoxaparin    Assessment/Action/Plan:  Lovenox 1mg/kg SQ every 12 hours. Pt had a dose on 2/11 ~1500.     Subjective:  Kodi Stark is a 74 y.o. male on Enoxaparin 120 mg SQ every 12 hours for indication of atrial fibrillation.  Objective:  [Ht: 182.9 cm (72\"); Wt: 113 kg (250 lb); BMI: Body mass index is 33.91 kg/m².]  Estimated Creatinine Clearance: 116.7 mL/min (A) (by C-G formula based on SCr of 0.71 mg/dL (L)). No results found for: \"DDIMER\"   Lab Results   Component Value Date    INR 1.02 02/11/2025    INR 1.13 08/16/2019    PROTIME 14.0 02/11/2025    PROTIME 13.9 08/16/2019      Lab Results   Component Value Date    HGB 16.9 02/11/2025    HGB 16.4 02/11/2025    HGB 17.7 08/16/2019      Lab Results   Component Value Date     02/11/2025     02/11/2025     08/16/2019       Quin Jerez, [facility]  02/11/25 19:41 CST     "

## 2025-02-12 NOTE — PROGRESS NOTES
"    [facility] Medicine Services  INPATIENT PROGRESS NOTE    Patient Name: Kodi Stark  Date of Admission: 2/11/2025  Today's Date: 02/12/25  Length of Stay: 1  Primary Care Physician: Holly Montoya MD    Subjective   Chief Complaint: Follow-up  HPI   Admitted for A-fib with rapid ventricular response  Has seen EP and been transitioned to p.o. Cardizem    No new complaints.  No new event.        Review of Systems   All pertinent negatives and positives are as above. All other systems have been reviewed and are negative unless otherwise stated.     Objective    Temp:  [96.8 °F (36 °C)-98.5 °F (36.9 °C)] 98.2 °F (36.8 °C)  Heart Rate:  [] 85  Resp:  [16-19] 16  BP: ()/(53-88) 113/80  Physical Exam  Patient is about to be taken for echocardiogram  GEN: Awake, alert, interactive, in NAD  HEENT: Atraumatic, PERRLA, EOMI, Anicteric, Trachea midline  Lungs: CTAB, no wheezing/rales/rhonchi  Heart: RRR, +S1/s2, no rub  ABD: soft, nt/nd, +BS, no guarding/rebound  Extremities: atraumatic, no cyanosis, no edema  Skin: no rashes or lesions  Neuro: AAOx3, no focal deficits  Psych: normal mood & affect         Results Review:  I have reviewed the labs, radiology results, and diagnostic studies.    Laboratory Data:   Results from last 7 days   Lab Units 02/11/25  1447 02/11/25  0930   WBC 10*3/mm3 7.26 8.34   HEMOGLOBIN g/dL 16.9 16.4   HEMATOCRIT % 49.3 49.3   PLATELETS 10*3/mm3 323 310        Results from last 7 days   Lab Units 02/11/25  1447 02/11/25  0930 02/11/25  0815   SODIUM mmol/L 132* 130*  --    POTASSIUM mmol/L 4.2 3.9  --    CHLORIDE mmol/L 93* 92*  --    CO2 mmol/L 24.0 25.0  --    BUN mg/dL 11 12  --    CREATININE mg/dL 0.71* 0.71* 0.90   CALCIUM mg/dL 9.9 9.5  --    BILIRUBIN mg/dL 0.7 0.7  --    ALK PHOS U/L 48 48  --    ALT (SGPT) U/L 19 19  --    AST (SGOT) U/L 25 25  --    GLUCOSE mg/dL 101* 108*  --        Culture Data:   No results found for: \"BLOODCX\", " "\"URINECX\", \"WOUNDCX\", \"MRSACX\", \"RESPCX\", \"STOOLCX\"    Radiology Data:   Imaging Results (Last 24 Hours)       Procedure Component Value Units Date/Time    XR Chest 1 View [[id number]] Collected: 02/11/25 1558     Updated: 02/11/25 1603    Narrative:      EXAMINATION: XR CHEST 1 VW- 2/11/2025 3:58 PM     HISTORY: dyspnea.     REPORT: A frontal view of the chest was obtained.     COMPARISON: CT abdomen pelvis on the same day 0841 hours, which includes  the lung bases.     There is moderate volume loss in the lung bases, with central and  basilar vascular crowding. No lung consolidation is identified however.  Heart size is normal. There is no pneumothorax or pleural effusion. The  osseous structures are unremarkable.       Impression:      Moderate bibasilar atelectasis with associated vascular  crowding. No consolidating infiltrates, no evidence of CHF.     This report was signed and finalized on 2/11/2025 4:00 PM by Dr. Cassius Mcclendon MD.               I have reviewed the patient's current medications.     Assessment/Plan   Assessment  Active Hospital Problems    Diagnosis     **Atrial fibrillation with rapid ventricular response              Medical Decision Making  Number and Complexity of problems:   Atrial fibrillation/flutter with rapid ventricular response  Hypertension  Mildly hyponatremic probably related to fasting  Daily alcohol consumption-patient at this time would have been in withdrawal.  He does not have any tremors.  He has no gross agitation.  His heart rate is elevated.  Hyperlipidemia  History of colonic polyp           Treatment Plan  Appreciate EP: Started on p.o. diltiazem with plan to transition to apixaban in terms of anticoagulation; considering antiarrhythmic initiation if turns out to be paroxysmal.  Plans for inpatient RENETTA and cardioversion if rate are not able to adequately be controlled  Appreciate courtesy input from Dr. Gaytan with send patient for evaluation by cardiologist that " subsequently led to patient's hospitalization.  Transthoracic echocardiogram pending    atorvastatin, 10 mg, Oral, Daily  dilTIAZem, 60 mg, Oral, Q6H  enoxaparin, 1 mg/kg, Subcutaneous, Q12H  folic acid, 1 mg, Oral, Daily  lisinopril, 40 mg, Oral, Daily  sodium chloride, 10 mL, Intravenous, Q12H  thiamine (B-1) IV, 200 mg, Intravenous, Q8H   Followed by  [START ON 2/17/2025] thiamine, 100 mg, Oral, Daily      Conditions and Status        Fair, stable     [facility] Data  External documents reviewed: Reviewed epic record  Cardiac tracing (EKG, telemetry) interpretation: Atrial flutter on telemetry  Now rate controlled on 10 mg/h of Cardizem  Radiology interpretation: Reviewed moderate bibasilar atelectasis with vascular crowding no evidence of infiltrates or evidence of CHF  CT of the abdomen pelvis reviewed as well.  Hypodense liver lesions too small to be characterized but likely cysts.  Labs reviewed: y  Any tests that were considered but not ordered: None     Decision rules/scores evaluated (example [id number], Wells, etc): -     Discussed with: Patient and daughters.  Discussed with Dr. Monzon     Care Planning  Shared decision making: Patient and family  Code status and discussions: Full code     Disposition  Social Determinants of Health that impact treatment or disposition: None identified at this time  Estimated length of stay is to be determined.      I confirmed that the patient's advanced care plan is present, code status is documented, and a surrogate decision maker is listed in the patient's medical record.      The patient's surrogate decision maker is family.        Electronically signed by Hardik Garvey MD, 02/12/25, 14:23 CST.

## 2025-02-12 NOTE — CASE MANAGEMENT/SOCIAL WORK
Discharge Planning Assessment   Brooke     Patient Name: Kodi Stark  MRN: [id number]  Today's Date: 2/12/2025    Admit Date: 2/11/2025        Discharge Needs Assessment       Row Name 02/12/25 1350       Living Environment    People in Home spouse    Current Living Arrangements home    Potentially Unsafe Housing Conditions none    In the past 12 months has the electric, gas, oil, or water company threatened to shut off services in your home? No    Primary Care Provided by self    Provides Primary Care For no one    Family Caregiver if Needed child(darryl), adult;spouse    Quality of Family Relationships helpful;involved;supportive    Able to Return to Prior Arrangements yes       Resource/Environmental Concerns    Resource/Environmental Concerns none    Transportation Concerns none       Transportation Needs    In the past 12 months, has lack of transportation kept you from medical appointments or from getting medications? no    In the past 12 months, has lack of transportation kept you from meetings, work, or from getting things needed for daily living? No       Food Insecurity    Within the past 12 months, you worried that your food would run out before you got the money to buy more. Never true    Within the past 12 months, the food you bought just didn't last and you didn't have money to get more. Never true       Transition Planning    Patient/Family Anticipates Transition to home;home with family    Patient/Family Anticipated Services at Transition none    Transportation Anticipated car, drives self;family or friend will provide       Discharge Needs Assessment    Readmission Within the Last 30 Days no previous admission in last 30 days    Equipment Currently Used at Home none    Concerns to be Addressed no discharge needs identified    Do you want help finding or keeping work or a job? I do not need or want help    Do you want help with school or training? For example, starting or completing job training or  getting a high school diploma, GED or equivalent No    Anticipated Changes Related to Illness none    Equipment Needed After Discharge none    Provided Post Acute Provider List? N/A    Provided Post Acute Provider Quality & Resource List? N/A    Discharge Coordination/Progress Lives at home with his wife. Drives self but wife or daughter can provide transportation if need be. Has PCP and Rx coverage. No D/C needs identified at this time.                   Discharge Plan    No documentation.                 Continued Care and Services - Admitted Since 2/11/2025    No active coordination exists for this encounter.          Demographic Summary    No documentation.                  Functional Status    No documentation.                  Psychosocial    No documentation.                  Abuse/Neglect    No documentation.                  Legal    No documentation.                  Substance Abuse    No documentation.                  Patient Forms    No documentation.                     Thomas Zhou RN

## 2025-02-12 NOTE — CONSULTS
"EP CONSULT NOTE    Subjective        History of Present Illness    EP Problems:  1.  Atrial fibrillation, new diagnosis  2.  Atrial flutter, uncertain type    Cardiology Problems:  1.  Hypertension  2.  Hyperlipidemia    Medical Problems:  1.  Alcohol use disorder  2.  Colon cancer (2023)    Kodi Stark is a 74 y.o. male with problem list as above for whom EP is consulted regarding new diagnosis atrial fibrillation atrial flutter.  He came to the preop visit yesterday for high-grade tubular adenoma.  He was found to be in atrial fibrillation with RVR and was sent to the ER for further evaluation.  He has been having some mildly increased fatigue as well as increased shortness of breath with exertion recently.  He is unsure how long this has been going on.  Given these findings, he was sent to the ER for further evaluation.  In the ER, he was noted to be in atrial flutter with rapid ventricular rates.  He was started on IV diltiazem and has had subsequent return of atrial fibrillation which has been rate controlled.    Objective   Vital Signs:  /75 (BP Location: Right arm, Patient Position: Lying)   Pulse 105   Temp 98.1 °F (36.7 °C) (Oral)   Resp 16   Ht 182.9 cm (72\")   Wt 113 kg (249 lb 6.4 oz)   SpO2 97%   BMI 33.82 kg/m²   Estimated body mass index is 33.82 kg/m² as calculated from the following:    Height as of this encounter: 182.9 cm (72\").    Weight as of this encounter: 113 kg (249 lb 6.4 oz).      Physical Exam  Vitals reviewed.   Constitutional:       Appearance: Normal appearance. He is obese.   Cardiovascular:      Rate and Rhythm: Normal rate. Rhythm irregular.      Pulses: Normal pulses.      Heart sounds: Normal heart sounds.   Pulmonary:      Effort: Pulmonary effort is normal.      Breath sounds: Normal breath sounds.   Musculoskeletal:         General: No swelling.   Neurological:      Mental Status: He is alert.   Psychiatric:         Mood and Affect: Mood normal.         Judgment: " Judgment normal.          Result Review :  The following data was reviewed by: Juan Foster MD on 02/11/2025:  CMP          2/11/2025    07:58 2/11/2025    08:15 2/11/2025    09:30 2/11/2025    14:47   CMP   Glucose   108  101    BUN   12  11    Creatinine 1.40  0.90  0.71  0.71    EGFR   95.7  95.7    Sodium   130  132    Potassium   3.9  4.2    Chloride   92  93    Calcium   9.5  9.9    Total Protein   8.1  8.5    Albumin   4.3  4.5    Globulin   3.8  4.0    Total Bilirubin   0.7  0.7    Alkaline Phosphatase   48  48    AST (SGOT)   25  25    ALT (SGPT)   19  19    Albumin/Globulin Ratio   1.1  1.1    BUN/Creatinine Ratio   16.9  15.5    Anion Gap   13.0  15.0      CBC          2/11/2025    09:30 2/11/2025    14:47   CBC   WBC 8.34  7.26    RBC 5.32  5.36    Hemoglobin 16.4  16.9    Hematocrit 49.3  49.3    MCV 92.7  92.0    MCH 30.8  31.5    MCHC 33.3  34.3    RDW 12.6  12.6    Platelets 310  323      TSH          2/11/2025    16:01   TSH   TSH 2.120          [id number] SCORE   [id number] Score: 3 (2/11/2025  1:29 PM)             Assessment and Plan   Problems:  New diagnosis of atrial fibrillation and atrial flutter    Kodi Stark is a 74 y.o. male with problem list as above for whom EP is consulted regarding new diagnosis atrial fibrillation and atrial flutter.  I do think that given his need for mass resection in the near future, I think that our best initial strategy would be a rate control strategy to try to prevent any need for prolonged anticoagulation.  If he is able to be adequately rate controlled, I do think that his surgical risk is reasonably low and he can proceed on the schedule.  If rates are unable to be reasonably controlled with oral medications, then would recommend RENETTA and cardioversion with 4 weeks of post cardioversion anticoagulation.  Long-term, he certainly may be a reasonable candidate for a rhythm control strategy with either early ablation or antiarrhythmic  therapy    Plan:  -Start diltiazem 60 mg every 6 hours for now  -Okay to transition to apixaban from EP standpoint  -Echocardiogram pending  -Would consider antiarrhythmic initiation if he turns out to be paroxysmal  -Plan for inpatient RENETTA and cardioversion if rates are unable to be adequately controlled             Part of this note may be an electronic transcription/translation of spoken language to printed text using the Dragon Dictation System.

## 2025-02-12 NOTE — PROGRESS NOTES
Came by to discuss future plans for colon resection with the patient and his family.  For the time being, he will remain on therapeutic anticoagulation with therapeutic Lovenox converted to a DOAC for outpatient.  Still planning on colon resection within the next 4 weeks or so.

## 2025-02-12 NOTE — ED NOTES
Nursing report ED to floor  Kodi Stark  74 y.o.  male    HPI:   Chief Complaint   Patient presents with    Rapid Heart Rate       Admitting doctor:   Hardik Garvey MD    Consulting provider(s):  Consults       Date and Time Order Name Status Description    2/11/2025  7:19 PM Inpatient Cardiac Electrophysiology Consult               Admitting diagnosis:   The encounter diagnosis was Atrial fibrillation with rapid ventricular response.    Code status:   Current Code Status       Date Active Code Status Order ID Comments User Context       2/11/2025 1751 CPR (Attempt to Resuscitate) [id number]  Hardik Garvey MD ED        Question Answer    Code Status (Patient has no pulse and is not breathing) CPR (Attempt to Resuscitate)    Medical Interventions (Patient has pulse or is breathing) Full Support    Level Of Support Discussed With Patient                    Allergies:   Patient has no known allergies.    Intake and Output  No intake or output data in the 24 hours ending 02/11/25 2020    Weight:       02/11/25  1350   Weight: 113 kg (250 lb)       Most recent vitals:   Vitals:    02/11/25 1557 02/11/25 2000 02/11/25 2001 02/11/25 2011   BP: 114/68  100/70    Pulse: 98 84 94 83   Resp:       Temp:       SpO2:  97%  95%   Weight:       Height:         Oxygen Therapy: .    Active LDAs/IV Access:   Lines, Drains & Airways       Active LDAs       Name Placement date Placement time Site Days    Peripheral IV 02/11/25 0820 Left Antecubital 02/11/25  0820  Antecubital  less than 1    Peripheral IV 02/11/25 1452 Posterior;Right Hand 02/11/25  1452  Hand  less than 1                    Labs (abnormal labs have a star):   Labs Reviewed   COMPREHENSIVE METABOLIC PANEL - Abnormal; Notable for the following components:       Result Value    Glucose 101 (*)     Creatinine 0.71 (*)     Sodium 132 (*)     Chloride 93 (*)     All other components within normal limits    Narrative:     GFR Categories in Chronic  Kidney Disease (CKD)      GFR Category          GFR (mL/min/1.73)    Interpretation  G1                     90 or greater         Normal or high (1)  G2                      60-89                Mild decrease (1)  G3a                   45-59                Mild to moderate decrease  G3b                   30-44                Moderate to severe decrease  G4                    15-29                Severe decrease  G5                    14 or less           Kidney failure          (1)In the absence of evidence of kidney disease, neither GFR category G1 or G2 fulfill the criteria for CKD.    eGFR calculation 2021 CKD-EPI creatinine equation, which does not include race as a factor   CBC WITH AUTO DIFFERENTIAL - Abnormal; Notable for the following components:    Lymphocyte % 17.2 (*)     All other components within normal limits   PROTIME-INR - Normal   APTT - Normal   TROPONIN - Normal    Narrative:     High Sensitive Troponin T Reference Range:  <14.0 ng/L- Negative Female for AMI  <22.0 ng/L- Negative Male for AMI  >=14 - Abnormal Female indicating possible myocardial injury.  >=22 - Abnormal Male indicating possible myocardial injury.   Clinicians would have to utilize clinical acumen, EKG, Troponin, and serial changes to determine if it is an Acute Myocardial Infarction or myocardial injury due to an underlying chronic condition.        MAGNESIUM - Normal   HIGH SENSITIVITIY TROPONIN T 1HR - Normal    Narrative:     High Sensitive Troponin T Reference Range:  <14.0 ng/L- Negative Female for AMI  <22.0 ng/L- Negative Male for AMI  >=14 - Abnormal Female indicating possible myocardial injury.  >=22 - Abnormal Male indicating possible myocardial injury.   Clinicians would have to utilize clinical acumen, EKG, Troponin, and serial changes to determine if it is an Acute Myocardial Infarction or myocardial injury due to an underlying chronic condition.        TSH - Normal   CBC AND DIFFERENTIAL    Narrative:     The  following orders were created for panel order CBC & Differential.  Procedure                               Abnormality         Status                     ---------                               -----------         ------                     CBC Auto Differential[[id number]]        Abnormal            Final result                 Please view results for these tests on the individual orders.       Meds given in ED:   Medications   sodium chloride 0.9 % flush 10 mL (has no administration in time range)   dilTIAZem (CARDIZEM) 125 mg in 125 mL sodium chloride  infusion (10 mg/hr Intravenous Rate/Dose Change 2/11/25 1557)   lisinopril (PRINIVIL,ZESTRIL) tablet 40 mg (40 mg Oral Not Given 2/11/25 1937)   atorvastatin (LIPITOR) tablet 10 mg (10 mg Oral Not Given 2/11/25 1939)   sodium chloride 0.9 % flush 10 mL (10 mL Intravenous Not Given 2/11/25 2009)   sodium chloride 0.9 % flush 10 mL (has no administration in time range)   sodium chloride 0.9 % infusion 40 mL (has no administration in time range)   Potassium Replacement - Follow Nurse / BPA Driven Protocol (has no administration in time range)   Magnesium Standard Dose Replacement - Follow Nurse / BPA Driven Protocol (has no administration in time range)   Phosphorus Replacement - Follow Nurse / BPA Driven Protocol (has no administration in time range)   Calcium Replacement - Follow Nurse / BPA Driven Protocol (has no administration in time range)   acetaminophen (TYLENOL) tablet 650 mg (has no administration in time range)     Or   acetaminophen (TYLENOL) 160 MG/5ML oral solution 650 mg (has no administration in time range)     Or   acetaminophen (TYLENOL) suppository 650 mg (has no administration in time range)   sennosides-docusate (PERICOLACE) 8.6-50 MG per tablet 2 tablet (has no administration in time range)     And   polyethylene glycol (MIRALAX) packet 17 g (has no administration in time range)     And   bisacodyl (DULCOLAX) EC tablet 5 mg (has no administration  in time range)     And   bisacodyl (DULCOLAX) suppository 10 mg (has no administration in time range)   Pharmacy to Dose enoxaparin (LOVENOX) (has no administration in time range)   ondansetron (ZOFRAN) injection 4 mg (has no administration in time range)   folic acid (FOLVITE) tablet 1 mg (1 mg Oral Not Given 2/11/25 1936)   thiamine (B-1) injection 200 mg (has no administration in time range)     Followed by   thiamine (VITAMIN B-1) tablet 100 mg (has no administration in time range)   LORazepam (ATIVAN) tablet 1 mg (has no administration in time range)     Or   LORazepam (ATIVAN) injection 1 mg (has no administration in time range)     Or   LORazepam (ATIVAN) tablet 2 mg (has no administration in time range)     Or   LORazepam (ATIVAN) injection 2 mg (has no administration in time range)     Or   LORazepam (ATIVAN) injection 2 mg (has no administration in time range)     Or   LORazepam (ATIVAN) injection 2 mg (has no administration in time range)   sodium chloride 0.9 % infusion (100 mL/hr Intravenous New Bag 2/11/25 1935)   Enoxaparin Sodium (LOVENOX) syringe 120 mg (has no administration in time range)   dilTIAZem (CARDIZEM) injection 10 mg (10 mg Intravenous Given 2/11/25 1504)   Enoxaparin Sodium (LOVENOX) syringe 120 mg (120 mg Subcutaneous Given 2/11/25 1504)     dilTIAZem, 5-15 mg/hr, Last Rate: 10 mg/hr (02/11/25 1557)  Pharmacy to Dose enoxaparin (LOVENOX),   sodium chloride, 100 mL/hr, Last Rate: 100 mL/hr (02/11/25 1935)         NIH Stroke Scale:       Isolation/Infection(s):  No active isolations   No active infections     COVID Testing  Collected .  Resulted .    Nursing report ED to floor:  Mental status: .  Ambulatory status: .  Precautions: .    ED nurse phone extentsion- ..

## 2025-02-13 VITALS
WEIGHT: 249.4 LBS | BODY MASS INDEX: 33.78 KG/M2 | RESPIRATION RATE: 16 BRPM | DIASTOLIC BLOOD PRESSURE: 68 MMHG | SYSTOLIC BLOOD PRESSURE: 114 MMHG | TEMPERATURE: 97.3 F | HEIGHT: 72 IN | HEART RATE: 80 BPM | OXYGEN SATURATION: 92 %

## 2025-02-13 LAB
ANION GAP SERPL CALCULATED.3IONS-SCNC: 11 MMOL/L (ref 5–15)
BUN SERPL-MCNC: 12 MG/DL (ref 8–23)
BUN/CREAT SERPL: 16.2 (ref 7–25)
CALCIUM SPEC-SCNC: 9 MG/DL (ref 8.6–10.5)
CHLORIDE SERPL-SCNC: 99 MMOL/L (ref 98–107)
CO2 SERPL-SCNC: 25 MMOL/L (ref 22–29)
CREAT SERPL-MCNC: 0.74 MG/DL (ref 0.76–1.27)
DEPRECATED RDW RBC AUTO: 43.4 FL (ref 37–54)
EGFRCR SERPLBLD CKD-EPI 2021: 94.5 ML/MIN/1.73
ERYTHROCYTE [DISTWIDTH] IN BLOOD BY AUTOMATED COUNT: 12.8 % (ref 12.3–15.4)
GLUCOSE SERPL-MCNC: 104 MG/DL (ref 65–99)
HCT VFR BLD AUTO: 43.3 % (ref 37.5–51)
HGB BLD-MCNC: 14.5 G/DL (ref 13–17.7)
MCH RBC QN AUTO: 31 PG (ref 26.6–33)
MCHC RBC AUTO-ENTMCNC: 33.5 G/DL (ref 31.5–35.7)
MCV RBC AUTO: 92.7 FL (ref 79–97)
PLATELET # BLD AUTO: 284 10*3/MM3 (ref 140–450)
PMV BLD AUTO: 10.4 FL (ref 6–12)
POTASSIUM SERPL-SCNC: 4 MMOL/L (ref 3.5–5.2)
RBC # BLD AUTO: 4.67 10*6/MM3 (ref 4.14–5.8)
SODIUM SERPL-SCNC: 135 MMOL/L (ref 136–145)
WBC NRBC COR # BLD AUTO: 5.83 10*3/MM3 (ref 3.4–10.8)

## 2025-02-13 PROCEDURE — 85027 COMPLETE CBC AUTOMATED: CPT | Performed by: INTERNAL MEDICINE

## 2025-02-13 PROCEDURE — 80048 BASIC METABOLIC PNL TOTAL CA: CPT | Performed by: INTERNAL MEDICINE

## 2025-02-13 PROCEDURE — 25010000002 ENOXAPARIN PER 10 MG: Performed by: INTERNAL MEDICINE

## 2025-02-13 PROCEDURE — 25010000002 THIAMINE HCL 200 MG/2ML SOLUTION: Performed by: INTERNAL MEDICINE

## 2025-02-13 RX ORDER — DILTIAZEM HYDROCHLORIDE 120 MG/1
240 CAPSULE, COATED, EXTENDED RELEASE ORAL
Status: DISCONTINUED | OUTPATIENT
Start: 2025-02-13 | End: 2025-02-13 | Stop reason: HOSPADM

## 2025-02-13 RX ORDER — DILTIAZEM HYDROCHLORIDE 240 MG/1
240 CAPSULE, COATED, EXTENDED RELEASE ORAL
Qty: 30 CAPSULE | Refills: 0 | Status: SHIPPED | OUTPATIENT
Start: 2025-02-14

## 2025-02-13 RX ADMIN — DILTIAZEM HYDROCHLORIDE 240 MG: 120 CAPSULE, EXTENDED RELEASE ORAL at 08:28

## 2025-02-13 RX ADMIN — DILTIAZEM HYDROCHLORIDE 60 MG: 30 TABLET, FILM COATED ORAL at 06:53

## 2025-02-13 RX ADMIN — ENOXAPARIN SODIUM 120 MG: 120 INJECTION SUBCUTANEOUS at 06:53

## 2025-02-13 RX ADMIN — DILTIAZEM HYDROCHLORIDE 60 MG: 30 TABLET, FILM COATED ORAL at 00:11

## 2025-02-13 RX ADMIN — ATORVASTATIN CALCIUM 10 MG: 10 TABLET, FILM COATED ORAL at 08:28

## 2025-02-13 RX ADMIN — Medication 10 ML: at 08:28

## 2025-02-13 RX ADMIN — LISINOPRIL 40 MG: 20 TABLET ORAL at 08:28

## 2025-02-13 RX ADMIN — THIAMINE HYDROCHLORIDE 200 MG: 100 INJECTION, SOLUTION INTRAMUSCULAR; INTRAVENOUS at 06:53

## 2025-02-13 RX ADMIN — FOLIC ACID 1 MG: 1 TABLET ORAL at 08:28

## 2025-02-13 NOTE — DISCHARGE SUMMARY
[facility] Medicine Services  DISCHARGE SUMMARY       Date of Admission: 2/11/2025  Date of Discharge:  2/13/2025  Primary Care Physician: Holly Montoya MD    Presenting Problem/History of Present Illness:  Sent from the clinic of cardiologist to ER because of A-fib with rapid ventricular response      Final Discharge Diagnoses:  Atrial fibrillation/flutter with rapid ventricular response  Hypertension  Mildly hyponatremic probably related to fasting  Daily alcohol consumption without evidence of acute withdrawal  Hyperlipidemia  History of colonic polyp         Consults:   EP    Procedures Performed: None    Pertinent Test Results:   Results for orders placed during the hospital encounter of 02/11/25    Adult Transthoracic Echo Complete W/ Cont if Necessary Per Protocol    Interpretation Summary    Left ventricular systolic function is normal. Left ventricular ejection fraction appears to be 61 - 65%.    Left ventricular wall thickness is consistent with mild concentric hypertrophy.    The right ventricular cavity is mild to moderately dilated. Normal right ventricular systolic function noted.    There is mild calcification of the aortic valve. No aortic valve regurgitation is present. No hemodynamically significant aortic valve stenosis is present.    No mitral valve regurgitation or significant stenosis is present. Mild mitral annular calcification is present.      Imaging Results (All)       Procedure Component Value Units Date/Time    XR Chest 1 View [[id number]] Collected: 02/11/25 1558     Updated: 02/11/25 1603    Narrative:      EXAMINATION: XR CHEST 1 VW- 2/11/2025 3:58 PM     HISTORY: dyspnea.     REPORT: A frontal view of the chest was obtained.     COMPARISON: CT abdomen pelvis on the same day 0841 hours, which includes  the lung bases.     There is moderate volume loss in the lung bases, with central and  basilar vascular crowding. No lung consolidation is  identified however.  Heart size is normal. There is no pneumothorax or pleural effusion. The  osseous structures are unremarkable.       Impression:      Moderate bibasilar atelectasis with associated vascular  crowding. No consolidating infiltrates, no evidence of CHF.     This report was signed and finalized on 2/11/2025 4:00 PM by Dr. Cassius Mcclendon MD.             LAB RESULTS:      Lab 02/13/25 0444 02/11/25  1447 02/11/25  0930   WBC 5.83 7.26 8.34   HEMOGLOBIN 14.5 16.9 16.4   HEMATOCRIT 43.3 49.3 49.3   PLATELETS 284 323 310   NEUTROS ABS  --  5.23  --    IMMATURE GRANS (ABS)  --  0.03  --    LYMPHS ABS  --  1.25  --    MONOS ABS  --  0.70  --    EOS ABS  --  0.02  --    MCV 92.7 92.0 92.7   PROTIME  --  14.0  --    APTT  --  26.8  --          Lab 02/13/25 0444 02/11/25  1601 02/11/25  1447 02/11/25  0930 02/11/25  0815 02/11/25  0758   SODIUM 135*  --  132* 130*  --   --    POTASSIUM 4.0  --  4.2 3.9  --   --    CHLORIDE 99  --  93* 92*  --   --    CO2 25.0  --  24.0 25.0  --   --    ANION GAP 11.0  --  15.0 13.0  --   --    BUN 12  --  11 12  --   --    CREATININE 0.74*  --  0.71* 0.71* 0.90 1.40*   EGFR 94.5  --  95.7 95.7  --   --    GLUCOSE 104*  --  101* 108*  --   --    CALCIUM 9.0  --  9.9 9.5  --   --    MAGNESIUM  --   --  2.1  --   --   --    TSH  --  2.120  --   --   --   --          Lab 02/11/25 1447 02/11/25  0930   TOTAL PROTEIN 8.5 8.1   ALBUMIN 4.5 4.3   GLOBULIN 4.0 3.8   ALT (SGPT) 19 19   AST (SGOT) 25 25   BILIRUBIN 0.7 0.7   ALK PHOS 48 48         Lab 02/11/25  1601 02/11/25  1447   HSTROP T 16 15   PROTIME  --  14.0   INR  --  1.02             Lab 02/11/25  0930   ABO TYPING B   RH TYPING Negative   ANTIBODY SCREEN Negative         Brief Urine Lab Results       None          Microbiology Results (last 10 days)       ** No results found for the last 240 hours. **            Hospital Course:   Patient expressed he is ready to go home.  Family concur.  Patient was sent by Dr. Araiza to  "Dr. Monzon's office because of arrhythmia.  Patient has new onset atrial fibrillation with rapid ventricular response.  He was sent to the emergency room.  He got admitted to hospitalist service.  We started him on Cardizem IV which later was transitioned to oral long-acting Cardizem.  Anticoagulation was started and switched to DOAC.  Rate is better controlled.  Echocardiogram as shown above with normal EF.  Follow-up with Dr. Foster in outpatient setting per his recommendation.  Follow-up with Dr. Cadena in outpatient setting within the next 4 weeks or so for colon resection planning    During this hospitalization we monitored him for any alcohol withdrawal.  He did not require any benzodiazepine.  He has not exhibited any symptoms of alcohol withdrawal besides tachycardia.    Patient is believed to be mildly dehydrated with hyponatremia from fasting prior to this admission.  He received IV fluid with improvement in serum sodium and volume status.      Patient is hemodynamically stable and appropriate for discharge from my standpoint pending any further recommendation by Dr. Foster.    Physical Exam on Discharge:  /68 (BP Location: Right arm, Patient Position: Lying)   Pulse 80   Temp 97.3 °F (36.3 °C) (Oral)   Resp 16   Ht 182.9 cm (72\")   Wt 113 kg (249 lb 6.4 oz)   SpO2 92%   BMI 33.82 kg/m²   Physical Exam  GEN: Awake, alert, interactive, in NAD  HEENT: Atraumatic, PERRLA, EOMI, Anicteric, Trachea midline  Lungs: CTAB, no wheezing/rales/rhonchi  Heart: iRRR, +S1/s2, no rub  ABD: soft, nt/nd, +BS, no guarding/rebound  Extremities: atraumatic, no cyanosis, no edema  Skin: no rashes or lesions  Neuro: AAOx3, no focal deficits  Psych: normal mood & affect  Condition on Discharge: Stable    Discharge Disposition:  Home or Self Care    Discharge Medications:     Discharge Medications        New Medications        Instructions Start Date   apixaban 5 MG tablet tablet  Commonly known as: ELIQUIS   5 mg, Oral, 2 " Times Daily      dilTIAZem  MG 24 hr capsule  Commonly known as: CARDIZEM CD   240 mg, Oral, Every 24 Hours Scheduled   Start Date: February 14, 2025            Continue These Medications        Instructions Start Date   lisinopril 40 MG tablet  Commonly known as: PRINIVIL,ZESTRIL   1 tablet, Oral, Daily      simvastatin 10 MG tablet  Commonly known as: ZOCOR   10 mg, Oral, Daily             ASK your doctor about these medications        Instructions Start Date   hydroCHLOROthiazide 25 MG tablet   25 mg, Daily               This patient has current or prior documentation of an left ventricular ejection fraction (LVEF) of less than or equal to 40%.    Results for orders placed during the hospital encounter of 02/11/25    Adult Transthoracic Echo Complete W/ Cont if Necessary Per Protocol    Interpretation Summary    Left ventricular systolic function is normal. Left ventricular ejection fraction appears to be 61 - 65%.    Left ventricular wall thickness is consistent with mild concentric hypertrophy.    The right ventricular cavity is mild to moderately dilated. Normal right ventricular systolic function noted.    There is mild calcification of the aortic valve. No aortic valve regurgitation is present. No hemodynamically significant aortic valve stenosis is present.    No mitral valve regurgitation or significant stenosis is present. Mild mitral annular calcification is present.         Discharge Diet:   Diet Instructions       Diet: Cardiac Diets; Healthy Heart (2-3 Na+); Thin (IDDSI 0)      Discharge Diet: Cardiac Diets    Cardiac Diet: Healthy Heart (2-3 Na+)    Fluid Consistency: Thin (IDDSI 0)            Activity at Discharge:   Activity Instructions       Gradually Increase Activity Until at Pre-Hospitalization Level              Follow-up Appointments:   PCP within 1 week  EP per their recommendation  Dr. Gaytan per his recommendation    Test Results Pending at Discharge: None    Electronically signed by  Hardik Garvey MD, 02/13/25, 10:05 CST.    Time: Greater than 30 minutes.

## 2025-02-13 NOTE — PLAN OF CARE
Goal Outcome Evaluation:  Plan of Care Reviewed With: patient        Progress: improving  Outcome Evaluation: VSS AOX4 No c/o pain. Remains in -112 up 160 with activity but resolves with rest. Po Cardizem started today. Echo done results Pending.

## 2025-02-13 NOTE — PROGRESS NOTES
"EP Problems:  1.  Atrial fibrillation, new diagnosis  2.  Atrial flutter, uncertain type     Cardiology Problems:  1.  Hypertension  2.  Hyperlipidemia     Medical Problems:  1.  Alcohol use disorder  2.  Colon cancer (2023)    Patient ID:  Kodi Stark is a 74 y.o. male with problem list as above as above who EP is following for new diagnosis atrial fibrillation and atrial flutter.    Subjective:  Doing better.  Rates been generally controlled on oral diltiazem.    Objective:  /68 (BP Location: Right arm, Patient Position: Lying)   Pulse 80   Temp 97.3 °F (36.3 °C) (Oral)   Resp 16   Ht 182.9 cm (72\")   Wt 113 kg (249 lb 6.4 oz)   SpO2 92%   BMI 33.82 kg/m²     Irregular, normal rate  Clear to auscultation bilaterally  Obese  Warm, well-perfused    Labs today:  Lab Results   Component Value Date    GLUCOSE 104 (H) 02/13/2025    CALCIUM 9.0 02/13/2025     (L) 02/13/2025    K 4.0 02/13/2025    CO2 25.0 02/13/2025    BUN 12 02/13/2025    CREATININE 0.74 (L) 02/13/2025    EGFR 94.5 02/13/2025     Lab Results   Component Value Date    WBC 5.83 02/13/2025    HGB 14.5 02/13/2025    HCT 43.3 02/13/2025     02/13/2025     Echo from yesterday reviewed: EF 61 to 65%, no significant valvular disease, moderately dilated RV, mild LVH    Telemetry directly visualized independently reviewed: Remains in atrial fibrillation, predominantly rate controlled with heart rates generally around 60 to 80 bpm    Assessment:  New diagnosis of atrial fibrillation and atrial flutter   HTN    Plan:  -Agree with holding hydrochlorothiazide given addition of diltiazem  -Continue lisinopril otherwise  -Rates are well-controlled on current regimen; transition to diltiazem 240 mg daily  -Continue apixaban given elevated [id number]  -Discussed with Dr. Cadena regarding surgical treatment plans  -Plan for outpatient follow-up with outpatient direct-current cardioversion if staying in atrial fibrillation once surgery is " complete  -Okay for discharge home from EP standpoint    Part of this note may be an electronic transcription/translation of spoken language to printed text using the Dragon Dictation System.

## 2025-02-13 NOTE — PLAN OF CARE
Goal Outcome Evaluation:  Plan of Care Reviewed With: patient        Progress: improving  Outcome Evaluation: VSS.  AF , per tele.  No c/o pain.  Safety maintained.

## 2025-02-17 ENCOUNTER — TELEPHONE (OUTPATIENT)
Dept: SURGERY | Facility: CLINIC | Age: 75
End: 2025-02-17
Payer: COMMERCIAL

## 2025-02-17 NOTE — TELEPHONE ENCOUNTER
Kodi Stark is scheduled for surgery with Dr. Cadena on 03/19/2025 with an arrival time of 7:00AM.  You will check in at the main registration desk.   On the day of surgery you will need a .   We ask that you do not eat or drink anything after midnight on the day of surgery.   Please do not take any anti-inflammatory or weight loss medications, vitamins, ibuprofen, aleve, Advil, motrin, Excedrin, mobic, meloxicam for 7 days prior to surgery.   Stop your blood thinner Eliquis for 2 days prior to surgery.   Do not take medications on the day of surgery.  ______________________________________________________________________________________________________________________________________  Your pre-work appointment will be on 03/13/2025 arrive at 8:30AM, you will check in at main registration.   For your pre-work appointment you do not have to fast.   For pre-work you do not need a . You will get some lab work and sign consent for surgery.   You may also get an EKG and/or chest xray if needed.     You will have an appointment with Dr. Cadena on 03/13/2025 at 9:30AM.    If you have any questions do not hesitate to reach out.   You can reach us at [phone] hit option 2.

## 2025-03-13 ENCOUNTER — PRE-ADMISSION TESTING (OUTPATIENT)
Dept: PREADMISSION TESTING | Facility: HOSPITAL | Age: 75
End: 2025-03-13
Payer: MEDICARE

## 2025-03-13 ENCOUNTER — OFFICE VISIT (OUTPATIENT)
Dept: SURGERY | Facility: CLINIC | Age: 75
End: 2025-03-13
Payer: MEDICARE

## 2025-03-13 VITALS
BODY MASS INDEX: 33.72 KG/M2 | HEART RATE: 80 BPM | SYSTOLIC BLOOD PRESSURE: 114 MMHG | OXYGEN SATURATION: 92 % | DIASTOLIC BLOOD PRESSURE: 68 MMHG | HEIGHT: 72 IN | WEIGHT: 249 LBS

## 2025-03-13 DIAGNOSIS — K63.5 DYSPLASTIC COLON POLYP: ICD-10-CM

## 2025-03-13 DIAGNOSIS — Z79.01 ON CONTINUOUS ORAL ANTICOAGULATION: ICD-10-CM

## 2025-03-13 DIAGNOSIS — D12.6 HIGH GRADE DYSPLASIA IN COLONIC ADENOMA: ICD-10-CM

## 2025-03-13 DIAGNOSIS — I48.91 ATRIAL FIBRILLATION WITH RAPID VENTRICULAR RESPONSE: Primary | ICD-10-CM

## 2025-03-13 LAB
ANION GAP SERPL CALCULATED.3IONS-SCNC: 10 MMOL/L (ref 5–15)
BUN SERPL-MCNC: 19 MG/DL (ref 8–23)
BUN/CREAT SERPL: 26 (ref 7–25)
CALCIUM SPEC-SCNC: 9.6 MG/DL (ref 8.6–10.5)
CHLORIDE SERPL-SCNC: 98 MMOL/L (ref 98–107)
CO2 SERPL-SCNC: 28 MMOL/L (ref 22–29)
CREAT SERPL-MCNC: 0.73 MG/DL (ref 0.76–1.27)
DEPRECATED RDW RBC AUTO: 42.8 FL (ref 37–54)
EGFRCR SERPLBLD CKD-EPI 2021: 94.3 ML/MIN/1.73
ERYTHROCYTE [DISTWIDTH] IN BLOOD BY AUTOMATED COUNT: 12.5 % (ref 12.3–15.4)
GLUCOSE SERPL-MCNC: 107 MG/DL (ref 65–99)
HCT VFR BLD AUTO: 42.7 % (ref 37.5–51)
HGB BLD-MCNC: 14 G/DL (ref 13–17.7)
MCH RBC QN AUTO: 30.6 PG (ref 26.6–33)
MCHC RBC AUTO-ENTMCNC: 32.8 G/DL (ref 31.5–35.7)
MCV RBC AUTO: 93.4 FL (ref 79–97)
PLATELET # BLD AUTO: 261 10*3/MM3 (ref 140–450)
PMV BLD AUTO: 10.5 FL (ref 6–12)
POTASSIUM SERPL-SCNC: 4.2 MMOL/L (ref 3.5–5.2)
RBC # BLD AUTO: 4.57 10*6/MM3 (ref 4.14–5.8)
SODIUM SERPL-SCNC: 136 MMOL/L (ref 136–145)
WBC NRBC COR # BLD AUTO: 5.95 10*3/MM3 (ref 3.4–10.8)

## 2025-03-13 PROCEDURE — 93005 ELECTROCARDIOGRAM TRACING: CPT

## 2025-03-13 PROCEDURE — 93010 ELECTROCARDIOGRAM REPORT: CPT | Performed by: HOSPITALIST

## 2025-03-13 PROCEDURE — 85027 COMPLETE CBC AUTOMATED: CPT

## 2025-03-13 PROCEDURE — 36415 COLL VENOUS BLD VENIPUNCTURE: CPT

## 2025-03-13 PROCEDURE — 80048 BASIC METABOLIC PNL TOTAL CA: CPT

## 2025-03-13 NOTE — DISCHARGE INSTRUCTIONS
Preparing for Surgery  Follow these instructions before the procedure:  Several days or weeks before your procedure  Medication(s) you need to stop prior to surgery: Follow your Surgeon's recommendations regarding your Eliquis      Ask your health care provider about:  Changing or stopping your regular medicines. This is especially important if you are taking diabetes medicines or blood thinners.  Taking medicines such as aspirin and ibuprofen. These medicines can thin your blood. Do not take these medicines unless your health care provider tells you to take them.  Taking over-the-counter medicines, vitamins, herbs, and supplements.    Contact your surgeon if you:  Develop a fever of more than 100.4°F (38°C) or other feelings of illness during the 48 hours before your surgery.  Have symptoms that get worse.  Have questions or concerns about your surgery.  If you are going home the same day of your surgery you will need to arrange for a responsible adult, age 18 years old or older, to drive you home from the hospital and stay with you for 24 hours. Verification of the  will be made prior to any procedure requiring sedation. You may not go home in a taxi or any form of public transportation by yourself.     Day before your procedure  Medication(s) you need to stop the day before your surgery:  Lisinopril    24 hours before your procedure DO NOT drink alcoholic beverages or smoke.  24 hours before your procedure STOP taking Erectile Dysfunction medication (i.e.,Cialis, Viagra)   You may be asked to shower with a germ-killing soap.  Day of your procedure   You may take the following medication(s) the morning of surgery with a sip of water: Only as directed by your doctor      8 hours before your scheduled arrival time, STOP all food, any dairy products, and full liquids. This includes hard candy, chewing gum or mints. This is extremely important to prevent serious complications.     Up to 2 hours before your  scheduled arrival time, you may have clear liquids no cream, powder, or pulp of any kind. Safe options are water, black coffee, plain tea, soda, Gatorade/Powerade, clear broth, apple juice.    2 hours before your scheduled arrival time, STOP drinking clear liquids.    You may need to take another shower with a germ-killing soap before you leave home in the morning. Do not use perfumes, colognes, or body lotions.  Wear comfortable loose-fitting clothing.  Remove all jewelry including body piercing and rings, dark colored nail polish, and make up prior to arrival at the hospital. Leave all valuables at home.   Bring your hearing aids if you rely on them.  Do not wear contact lenses. If you wear eyeglasses remember to bring a case to store them in while you are in surgery.  Do not use denture adhesives since you will be asked to remove them during your surgery.    You do not need to bring your home medications into the hospital.   Bring your sleep apnea device with you on the day of your surgery (if this applies to you).  If you have an Inspire implant for sleep apnea, please bring the remote with you on the day of surgery.  If you wear portable oxygen, bring it with you.   If you are staying overnight, you may bring a bag of items you may need such as slippers, robe and a change of clothes for your discharge. You may want to leave these items in the car until you are ready for them since your family will take your belongings when you leave the pre-operative area.  Arrive at the hospital as scheduled by the office. You will be asked to arrive 2 hours prior to your surgery time in order to prepare for your procedure.  When you arrive at the hospital  Go to the registration desk located at the main entrance of the hospital.  After registration is completed, you will be given a beeper and a sticker sheet. Take the stickers to Outpatient Surgery and place in the tray at the end of the desk to notify the staff that you have  arrived and registered.   Return to the lobby to wait. You are not always called back according to the time of arrival but rather the time your doctor will be ready.  When your beeper lights up and vibrates proceed through the double doors, under the stairs, and a member of the Outpatient Surgery staff will escort you to your preoperative room.

## 2025-03-15 LAB
QT INTERVAL: 330 MS
QTC INTERVAL: 430 MS

## 2025-03-18 RX ORDER — GABAPENTIN 300 MG/1
300 CAPSULE ORAL ONCE
Status: COMPLETED | OUTPATIENT
Start: 2025-03-19 | End: 2025-03-19

## 2025-03-19 ENCOUNTER — ANESTHESIA (OUTPATIENT)
Dept: PERIOP | Facility: HOSPITAL | Age: 75
End: 2025-03-19
Payer: MEDICARE

## 2025-03-19 ENCOUNTER — ANESTHESIA EVENT (OUTPATIENT)
Dept: PERIOP | Facility: HOSPITAL | Age: 75
End: 2025-03-19
Payer: MEDICARE

## 2025-03-19 ENCOUNTER — HOSPITAL ENCOUNTER (INPATIENT)
Facility: HOSPITAL | Age: 75
LOS: 7 days | Discharge: HOME | End: 2025-03-26
Attending: SURGERY | Admitting: SURGERY
Payer: MEDICARE

## 2025-03-19 DIAGNOSIS — D12.6 TUBULAR ADENOMA OF COLON: ICD-10-CM

## 2025-03-19 DIAGNOSIS — D12.6 HIGH GRADE DYSPLASIA IN COLONIC ADENOMA: ICD-10-CM

## 2025-03-19 PROBLEM — K63.5 DYSPLASTIC COLON POLYP: Status: ACTIVE | Noted: 2025-03-19

## 2025-03-19 PROCEDURE — 25010000002 CEFOXITIN PER 1 G: Performed by: SURGERY

## 2025-03-19 PROCEDURE — 25010000002 INDOCYANINE GREEN 25 MG RECONSTITUTED SOLUTION: Performed by: NURSE ANESTHETIST, CERTIFIED REGISTERED

## 2025-03-19 PROCEDURE — 25010000002 PROPOFOL 10 MG/ML EMULSION: Performed by: NURSE ANESTHETIST, CERTIFIED REGISTERED

## 2025-03-19 PROCEDURE — 25010000002 LIDOCAINE PF 2% 2 % SOLUTION: Performed by: NURSE ANESTHETIST, CERTIFIED REGISTERED

## 2025-03-19 PROCEDURE — 25010000002 PHENYLEPHRINE 10 MG/ML SOLUTION 5 ML VIAL: Performed by: NURSE ANESTHETIST, CERTIFIED REGISTERED

## 2025-03-19 PROCEDURE — P9041 ALBUMIN (HUMAN),5%, 50ML: HCPCS | Performed by: NURSE ANESTHETIST, CERTIFIED REGISTERED

## 2025-03-19 PROCEDURE — 94799 UNLISTED PULMONARY SVC/PX: CPT

## 2025-03-19 PROCEDURE — 44205 LAP COLECTOMY PART W/ILEUM: CPT | Performed by: SURGERY

## 2025-03-19 PROCEDURE — 88309 TISSUE EXAM BY PATHOLOGIST: CPT | Performed by: SURGERY

## 2025-03-19 PROCEDURE — 25010000002 ROPIVACAINE PER 1 MG: Performed by: SURGERY

## 2025-03-19 PROCEDURE — 25810000003 LACTATED RINGERS PER 1000 ML: Performed by: SURGERY

## 2025-03-19 PROCEDURE — 25010000002 HYDROMORPHONE 1 MG/ML SOLUTION: Performed by: SURGERY

## 2025-03-19 PROCEDURE — 25810000003 SODIUM CHLORIDE 0.9 % SOLUTION 250 ML FLEX CONT: Performed by: NURSE ANESTHETIST, CERTIFIED REGISTERED

## 2025-03-19 PROCEDURE — 25010000002 FENTANYL CITRATE (PF) 50 MCG/ML SOLUTION: Performed by: NURSE ANESTHETIST, CERTIFIED REGISTERED

## 2025-03-19 PROCEDURE — 25010000002 CEFOXITIN PER 1 G: Performed by: NURSE ANESTHETIST, CERTIFIED REGISTERED

## 2025-03-19 PROCEDURE — 25010000002 VASOPRESSIN 20 UNIT/ML SOLUTION: Performed by: NURSE ANESTHETIST, CERTIFIED REGISTERED

## 2025-03-19 PROCEDURE — 25010000002 ALBUMIN HUMAN 5% PER 50 ML: Performed by: NURSE ANESTHETIST, CERTIFIED REGISTERED

## 2025-03-19 PROCEDURE — 25010000002 SUGAMMADEX 200 MG/2ML SOLUTION: Performed by: NURSE ANESTHETIST, CERTIFIED REGISTERED

## 2025-03-19 PROCEDURE — 8E0W4CZ ROBOTIC ASSISTED PROCEDURE OF TRUNK REGION, PERCUTANEOUS ENDOSCOPIC APPROACH: ICD-10-PCS | Performed by: SURGERY

## 2025-03-19 PROCEDURE — 25010000002 ONDANSETRON PER 1 MG: Performed by: NURSE ANESTHETIST, CERTIFIED REGISTERED

## 2025-03-19 PROCEDURE — 25010000002 KETOROLAC TROMETHAMINE PER 15 MG: Performed by: SURGERY

## 2025-03-19 PROCEDURE — 25010000002 ENOXAPARIN PER 10 MG: Performed by: SURGERY

## 2025-03-19 PROCEDURE — 25010000002 MORPHINE PER 10 MG: Performed by: NURSE ANESTHETIST, CERTIFIED REGISTERED

## 2025-03-19 PROCEDURE — 0DTF4ZZ RESECTION OF RIGHT LARGE INTESTINE, PERCUTANEOUS ENDOSCOPIC APPROACH: ICD-10-PCS | Performed by: SURGERY

## 2025-03-19 DEVICE — STAPLER 60 RELOAD BLUE
Type: IMPLANTABLE DEVICE | Site: ABDOMEN | Status: FUNCTIONAL
Brand: SUREFORM

## 2025-03-19 DEVICE — PROXIMATE LINEAR CUTTER RELOAD, BLUE, 75MM
Type: IMPLANTABLE DEVICE | Site: ABDOMEN | Status: FUNCTIONAL
Brand: PROXIMATE

## 2025-03-19 DEVICE — HEMOST ABS SURGICEL SNOW 4X4IN: Type: IMPLANTABLE DEVICE | Site: ABDOMEN | Status: FUNCTIONAL

## 2025-03-19 DEVICE — PROXIMATE RELOADABLE LINEAR CUTTER WITH SAFETY LOCK-OUT, 75MM
Type: IMPLANTABLE DEVICE | Site: ABDOMEN | Status: FUNCTIONAL
Brand: PROXIMATE

## 2025-03-19 DEVICE — LARGE LIGATION CLIPS 6 CLIPS/CART
Type: IMPLANTABLE DEVICE | Site: ABDOMEN | Status: FUNCTIONAL
Brand: VAS-Q-CLIP

## 2025-03-19 DEVICE — ABSORBABLE WOUND CLOSURE DEVICE
Type: IMPLANTABLE DEVICE | Site: ABDOMEN | Status: FUNCTIONAL
Brand: V-LOC 90

## 2025-03-19 RX ORDER — SODIUM CHLORIDE 9 MG/ML
40 INJECTION, SOLUTION INTRAVENOUS AS NEEDED
Status: DISCONTINUED | OUTPATIENT
Start: 2025-03-19 | End: 2025-03-26 | Stop reason: HOSPADM

## 2025-03-19 RX ORDER — CYCLOBENZAPRINE HCL 10 MG
5 TABLET ORAL 3 TIMES DAILY
Status: DISCONTINUED | OUTPATIENT
Start: 2025-03-20 | End: 2025-03-25

## 2025-03-19 RX ORDER — OXYCODONE AND ACETAMINOPHEN 10; 325 MG/1; MG/1
1 TABLET ORAL EVERY 4 HOURS PRN
Status: DISCONTINUED | OUTPATIENT
Start: 2025-03-19 | End: 2025-03-19 | Stop reason: HOSPADM

## 2025-03-19 RX ORDER — AMOXICILLIN 250 MG
2 CAPSULE ORAL 2 TIMES DAILY PRN
Status: DISCONTINUED | OUTPATIENT
Start: 2025-03-19 | End: 2025-03-21

## 2025-03-19 RX ORDER — ACETAMINOPHEN 500 MG
1000 TABLET ORAL EVERY 8 HOURS
Status: COMPLETED | OUTPATIENT
Start: 2025-03-19 | End: 2025-03-20

## 2025-03-19 RX ORDER — PROPOFOL 10 MG/ML
VIAL (ML) INTRAVENOUS AS NEEDED
Status: DISCONTINUED | OUTPATIENT
Start: 2025-03-19 | End: 2025-03-19 | Stop reason: SURG

## 2025-03-19 RX ORDER — LISINOPRIL 20 MG/1
40 TABLET ORAL DAILY
Status: DISCONTINUED | OUTPATIENT
Start: 2025-03-19 | End: 2025-03-26 | Stop reason: HOSPADM

## 2025-03-19 RX ORDER — ACETAMINOPHEN 500 MG
1000 TABLET ORAL ONCE
Status: DISCONTINUED | OUTPATIENT
Start: 2025-03-19 | End: 2025-03-19 | Stop reason: HOSPADM

## 2025-03-19 RX ORDER — FAMOTIDINE 20 MG/1
40 TABLET, FILM COATED ORAL DAILY
Status: DISCONTINUED | OUTPATIENT
Start: 2025-03-19 | End: 2025-03-23

## 2025-03-19 RX ORDER — OXYCODONE HYDROCHLORIDE 5 MG/1
5 TABLET ORAL EVERY 4 HOURS PRN
Refills: 0 | Status: ACTIVE | OUTPATIENT
Start: 2025-03-19 | End: 2025-03-24

## 2025-03-19 RX ORDER — SODIUM CHLORIDE 0.9 % (FLUSH) 0.9 %
3-10 SYRINGE (ML) INJECTION AS NEEDED
Status: DISCONTINUED | OUTPATIENT
Start: 2025-03-19 | End: 2025-03-19 | Stop reason: HOSPADM

## 2025-03-19 RX ORDER — ONDANSETRON 2 MG/ML
4 INJECTION INTRAMUSCULAR; INTRAVENOUS
Status: DISCONTINUED | OUTPATIENT
Start: 2025-03-19 | End: 2025-03-19 | Stop reason: HOSPADM

## 2025-03-19 RX ORDER — ONDANSETRON 2 MG/ML
INJECTION INTRAMUSCULAR; INTRAVENOUS AS NEEDED
Status: DISCONTINUED | OUTPATIENT
Start: 2025-03-19 | End: 2025-03-19 | Stop reason: SURG

## 2025-03-19 RX ORDER — PHENYLEPHRINE HCL IN 0.9% NACL 1 MG/10 ML
SYRINGE (ML) INTRAVENOUS AS NEEDED
Status: DISCONTINUED | OUTPATIENT
Start: 2025-03-19 | End: 2025-03-19 | Stop reason: SURG

## 2025-03-19 RX ORDER — LABETALOL HYDROCHLORIDE 5 MG/ML
5 INJECTION, SOLUTION INTRAVENOUS
Status: DISCONTINUED | OUTPATIENT
Start: 2025-03-19 | End: 2025-03-19 | Stop reason: HOSPADM

## 2025-03-19 RX ORDER — FENTANYL CITRATE 50 UG/ML
50 INJECTION, SOLUTION INTRAMUSCULAR; INTRAVENOUS
Status: DISCONTINUED | OUTPATIENT
Start: 2025-03-19 | End: 2025-03-19 | Stop reason: HOSPADM

## 2025-03-19 RX ORDER — GABAPENTIN 300 MG/1
300 CAPSULE ORAL 3 TIMES DAILY
Status: DISCONTINUED | OUTPATIENT
Start: 2025-03-19 | End: 2025-03-19

## 2025-03-19 RX ORDER — BISACODYL 10 MG
10 SUPPOSITORY, RECTAL RECTAL DAILY PRN
Status: DISCONTINUED | OUTPATIENT
Start: 2025-03-19 | End: 2025-03-21

## 2025-03-19 RX ORDER — DILTIAZEM HYDROCHLORIDE 240 MG/1
240 CAPSULE, COATED, EXTENDED RELEASE ORAL
Status: DISCONTINUED | OUTPATIENT
Start: 2025-03-20 | End: 2025-03-21

## 2025-03-19 RX ORDER — CYCLOBENZAPRINE HCL 5 MG
5 TABLET ORAL 3 TIMES DAILY
Status: DISCONTINUED | OUTPATIENT
Start: 2025-03-19 | End: 2025-03-19 | Stop reason: SDUPTHER

## 2025-03-19 RX ORDER — NALOXONE HCL 0.4 MG/ML
0.04 VIAL (ML) INJECTION AS NEEDED
Status: DISCONTINUED | OUTPATIENT
Start: 2025-03-19 | End: 2025-03-19 | Stop reason: HOSPADM

## 2025-03-19 RX ORDER — BISACODYL 5 MG/1
5 TABLET, DELAYED RELEASE ORAL DAILY PRN
Status: DISCONTINUED | OUTPATIENT
Start: 2025-03-19 | End: 2025-03-21

## 2025-03-19 RX ORDER — CELECOXIB 200 MG/1
200 CAPSULE ORAL ONCE
Status: COMPLETED | OUTPATIENT
Start: 2025-03-19 | End: 2025-03-19

## 2025-03-19 RX ORDER — LIDOCAINE 4 G/G
1 PATCH TOPICAL
Status: DISCONTINUED | OUTPATIENT
Start: 2025-03-19 | End: 2025-03-26 | Stop reason: HOSPADM

## 2025-03-19 RX ORDER — SODIUM CHLORIDE 9 MG/ML
40 INJECTION, SOLUTION INTRAVENOUS AS NEEDED
Status: DISCONTINUED | OUTPATIENT
Start: 2025-03-19 | End: 2025-03-19 | Stop reason: HOSPADM

## 2025-03-19 RX ORDER — ROCURONIUM BROMIDE 10 MG/ML
INJECTION, SOLUTION INTRAVENOUS AS NEEDED
Status: DISCONTINUED | OUTPATIENT
Start: 2025-03-19 | End: 2025-03-19 | Stop reason: SURG

## 2025-03-19 RX ORDER — ALBUMIN HUMAN 50 G/1000ML
SOLUTION INTRAVENOUS CONTINUOUS PRN
Status: DISCONTINUED | OUTPATIENT
Start: 2025-03-19 | End: 2025-03-19 | Stop reason: SURG

## 2025-03-19 RX ORDER — OXYCODONE HYDROCHLORIDE 10 MG/1
10 TABLET ORAL EVERY 4 HOURS PRN
Refills: 0 | Status: DISPENSED | OUTPATIENT
Start: 2025-03-19 | End: 2025-03-24

## 2025-03-19 RX ORDER — SODIUM CHLORIDE 0.9 % (FLUSH) 0.9 %
3 SYRINGE (ML) INJECTION EVERY 12 HOURS SCHEDULED
Status: DISCONTINUED | OUTPATIENT
Start: 2025-03-19 | End: 2025-03-19 | Stop reason: HOSPADM

## 2025-03-19 RX ORDER — SODIUM CHLORIDE, SODIUM LACTATE, POTASSIUM CHLORIDE, CALCIUM CHLORIDE 600; 310; 30; 20 MG/100ML; MG/100ML; MG/100ML; MG/100ML
1000 INJECTION, SOLUTION INTRAVENOUS CONTINUOUS
Status: DISPENSED | OUTPATIENT
Start: 2025-03-19 | End: 2025-03-19

## 2025-03-19 RX ORDER — SODIUM CHLORIDE 0.9 % (FLUSH) 0.9 %
10 SYRINGE (ML) INJECTION AS NEEDED
Status: DISCONTINUED | OUTPATIENT
Start: 2025-03-19 | End: 2025-03-26 | Stop reason: HOSPADM

## 2025-03-19 RX ORDER — LIDOCAINE HYDROCHLORIDE 20 MG/ML
INJECTION, SOLUTION EPIDURAL; INFILTRATION; INTRACAUDAL; PERINEURAL AS NEEDED
Status: DISCONTINUED | OUTPATIENT
Start: 2025-03-19 | End: 2025-03-19 | Stop reason: SURG

## 2025-03-19 RX ORDER — SODIUM CHLORIDE, SODIUM LACTATE, POTASSIUM CHLORIDE, CALCIUM CHLORIDE 600; 310; 30; 20 MG/100ML; MG/100ML; MG/100ML; MG/100ML
1000 INJECTION, SOLUTION INTRAVENOUS CONTINUOUS
Status: DISPENSED | OUTPATIENT
Start: 2025-03-19 | End: 2025-03-20

## 2025-03-19 RX ORDER — CALCIUM CHLORIDE 100 MG/ML
INJECTION INTRAVENOUS; INTRAVENTRICULAR AS NEEDED
Status: DISCONTINUED | OUTPATIENT
Start: 2025-03-19 | End: 2025-03-19 | Stop reason: SURG

## 2025-03-19 RX ORDER — FENTANYL CITRATE 50 UG/ML
INJECTION, SOLUTION INTRAMUSCULAR; INTRAVENOUS AS NEEDED
Status: DISCONTINUED | OUTPATIENT
Start: 2025-03-19 | End: 2025-03-19 | Stop reason: SURG

## 2025-03-19 RX ORDER — FLUMAZENIL 0.1 MG/ML
0.2 INJECTION INTRAVENOUS AS NEEDED
Status: DISCONTINUED | OUTPATIENT
Start: 2025-03-19 | End: 2025-03-19 | Stop reason: HOSPADM

## 2025-03-19 RX ORDER — ONDANSETRON 4 MG/1
4 TABLET, ORALLY DISINTEGRATING ORAL EVERY 6 HOURS PRN
Status: DISCONTINUED | OUTPATIENT
Start: 2025-03-19 | End: 2025-03-26 | Stop reason: HOSPADM

## 2025-03-19 RX ORDER — PROCHLORPERAZINE EDISYLATE 5 MG/ML
5 INJECTION INTRAMUSCULAR; INTRAVENOUS EVERY 6 HOURS PRN
Status: DISCONTINUED | OUTPATIENT
Start: 2025-03-19 | End: 2025-03-25

## 2025-03-19 RX ORDER — METOPROLOL TARTRATE 1 MG/ML
INJECTION, SOLUTION INTRAVENOUS AS NEEDED
Status: DISCONTINUED | OUTPATIENT
Start: 2025-03-19 | End: 2025-03-19 | Stop reason: SURG

## 2025-03-19 RX ORDER — MORPHINE SULFATE 2 MG/ML
INJECTION, SOLUTION INTRAMUSCULAR; INTRAVENOUS AS NEEDED
Status: DISCONTINUED | OUTPATIENT
Start: 2025-03-19 | End: 2025-03-19 | Stop reason: SURG

## 2025-03-19 RX ORDER — SODIUM CHLORIDE, SODIUM LACTATE, POTASSIUM CHLORIDE, CALCIUM CHLORIDE 600; 310; 30; 20 MG/100ML; MG/100ML; MG/100ML; MG/100ML
100 INJECTION, SOLUTION INTRAVENOUS CONTINUOUS
Status: DISCONTINUED | OUTPATIENT
Start: 2025-03-19 | End: 2025-03-20

## 2025-03-19 RX ORDER — SODIUM CHLORIDE 0.9 % (FLUSH) 0.9 %
10 SYRINGE (ML) INJECTION AS NEEDED
Status: DISCONTINUED | OUTPATIENT
Start: 2025-03-19 | End: 2025-03-19 | Stop reason: HOSPADM

## 2025-03-19 RX ORDER — GABAPENTIN 100 MG/1
100 CAPSULE ORAL EVERY 8 HOURS SCHEDULED
Status: DISPENSED | OUTPATIENT
Start: 2025-03-19 | End: 2025-03-21

## 2025-03-19 RX ORDER — SODIUM CHLORIDE 0.9 % (FLUSH) 0.9 %
3 SYRINGE (ML) INJECTION AS NEEDED
Status: DISCONTINUED | OUTPATIENT
Start: 2025-03-19 | End: 2025-03-19 | Stop reason: HOSPADM

## 2025-03-19 RX ORDER — KETOROLAC TROMETHAMINE 30 MG/ML
15 INJECTION, SOLUTION INTRAMUSCULAR; INTRAVENOUS EVERY 6 HOURS
Status: DISPENSED | OUTPATIENT
Start: 2025-03-19 | End: 2025-03-22

## 2025-03-19 RX ORDER — ACETAMINOPHEN 325 MG/1
650 TABLET ORAL ONCE
Status: COMPLETED | OUTPATIENT
Start: 2025-03-19 | End: 2025-03-19

## 2025-03-19 RX ORDER — ONDANSETRON 2 MG/ML
4 INJECTION INTRAMUSCULAR; INTRAVENOUS EVERY 6 HOURS PRN
Status: DISCONTINUED | OUTPATIENT
Start: 2025-03-19 | End: 2025-03-26 | Stop reason: HOSPADM

## 2025-03-19 RX ORDER — POLYETHYLENE GLYCOL 3350 17 G/17G
17 POWDER, FOR SOLUTION ORAL DAILY PRN
Status: DISCONTINUED | OUTPATIENT
Start: 2025-03-19 | End: 2025-03-21

## 2025-03-19 RX ORDER — CEFOXITIN 1 G/1
INJECTION, POWDER, FOR SOLUTION INTRAVENOUS AS NEEDED
Status: DISCONTINUED | OUTPATIENT
Start: 2025-03-19 | End: 2025-03-19 | Stop reason: SURG

## 2025-03-19 RX ORDER — SODIUM CHLORIDE 0.9 % (FLUSH) 0.9 %
10 SYRINGE (ML) INJECTION EVERY 12 HOURS SCHEDULED
Status: DISCONTINUED | OUTPATIENT
Start: 2025-03-19 | End: 2025-03-26 | Stop reason: HOSPADM

## 2025-03-19 RX ORDER — ENOXAPARIN SODIUM 100 MG/ML
40 INJECTION SUBCUTANEOUS DAILY
Status: DISCONTINUED | OUTPATIENT
Start: 2025-03-20 | End: 2025-03-21

## 2025-03-19 RX ORDER — MIDAZOLAM HYDROCHLORIDE 2 MG/2ML
0.5 INJECTION, SOLUTION INTRAMUSCULAR; INTRAVENOUS
Status: DISCONTINUED | OUTPATIENT
Start: 2025-03-19 | End: 2025-03-19 | Stop reason: HOSPADM

## 2025-03-19 RX ORDER — MAGNESIUM HYDROXIDE 1200 MG/15ML
LIQUID ORAL AS NEEDED
Status: DISCONTINUED | OUTPATIENT
Start: 2025-03-19 | End: 2025-03-19 | Stop reason: HOSPADM

## 2025-03-19 RX ORDER — SODIUM CHLORIDE, SODIUM LACTATE, POTASSIUM CHLORIDE, CALCIUM CHLORIDE 600; 310; 30; 20 MG/100ML; MG/100ML; MG/100ML; MG/100ML
50 INJECTION, SOLUTION INTRAVENOUS CONTINUOUS
Status: DISCONTINUED | OUTPATIENT
Start: 2025-03-19 | End: 2025-03-21

## 2025-03-19 RX ORDER — LIDOCAINE HYDROCHLORIDE 10 MG/ML
0.5 INJECTION, SOLUTION EPIDURAL; INFILTRATION; INTRACAUDAL; PERINEURAL ONCE AS NEEDED
Status: DISCONTINUED | OUTPATIENT
Start: 2025-03-19 | End: 2025-03-19 | Stop reason: HOSPADM

## 2025-03-19 RX ORDER — HYDROMORPHONE HYDROCHLORIDE 1 MG/ML
0.5 INJECTION, SOLUTION INTRAMUSCULAR; INTRAVENOUS; SUBCUTANEOUS
Status: DISCONTINUED | OUTPATIENT
Start: 2025-03-19 | End: 2025-03-19 | Stop reason: HOSPADM

## 2025-03-19 RX ORDER — ENOXAPARIN SODIUM 100 MG/ML
40 INJECTION SUBCUTANEOUS NIGHTLY
Status: DISCONTINUED | OUTPATIENT
Start: 2025-03-19 | End: 2025-03-19 | Stop reason: HOSPADM

## 2025-03-19 RX ORDER — IBUPROFEN 600 MG/1
600 TABLET, FILM COATED ORAL EVERY 6 HOURS PRN
Status: DISCONTINUED | OUTPATIENT
Start: 2025-03-19 | End: 2025-03-19 | Stop reason: HOSPADM

## 2025-03-19 RX ORDER — INDOCYANINE GREEN AND WATER 25 MG
KIT INJECTION AS NEEDED
Status: DISCONTINUED | OUTPATIENT
Start: 2025-03-19 | End: 2025-03-19 | Stop reason: SURG

## 2025-03-19 RX ADMIN — ONDANSETRON 4 MG: 2 INJECTION INTRAMUSCULAR; INTRAVENOUS at 11:55

## 2025-03-19 RX ADMIN — Medication 300 MCG: at 09:52

## 2025-03-19 RX ADMIN — PROPOFOL 150 MG: 10 INJECTION, EMULSION INTRAVENOUS at 09:10

## 2025-03-19 RX ADMIN — ALBUMIN (HUMAN): 12.5 INJECTION, SOLUTION INTRAVENOUS at 10:30

## 2025-03-19 RX ADMIN — NOREPINEPHRINE BITARTRATE 2 MCG: 1 INJECTION, SOLUTION, CONCENTRATE INTRAVENOUS at 10:35

## 2025-03-19 RX ADMIN — SODIUM CHLORIDE, POTASSIUM CHLORIDE, SODIUM LACTATE AND CALCIUM CHLORIDE: 600; 310; 30; 20 INJECTION, SOLUTION INTRAVENOUS at 10:13

## 2025-03-19 RX ADMIN — GABAPENTIN 100 MG: 100 CAPSULE ORAL at 22:15

## 2025-03-19 RX ADMIN — ROCURONIUM BROMIDE 100 MG: 10 INJECTION, SOLUTION INTRAVENOUS at 09:10

## 2025-03-19 RX ADMIN — NOREPINEPHRINE BITARTRATE 2 MCG: 1 INJECTION, SOLUTION, CONCENTRATE INTRAVENOUS at 10:52

## 2025-03-19 RX ADMIN — LIDOCAINE 1 PATCH: 4 PATCH TOPICAL at 18:25

## 2025-03-19 RX ADMIN — HYDROMORPHONE HYDROCHLORIDE 0.5 MG: 1 INJECTION, SOLUTION INTRAMUSCULAR; INTRAVENOUS; SUBCUTANEOUS at 14:59

## 2025-03-19 RX ADMIN — OXYCODONE HYDROCHLORIDE 10 MG: 10 TABLET ORAL at 18:25

## 2025-03-19 RX ADMIN — PHENYLEPHRINE HYDROCHLORIDE 0.5 MCG/KG/MIN: 10 INJECTION INTRAVENOUS at 10:57

## 2025-03-19 RX ADMIN — LIDOCAINE HYDROCHLORIDE 100 MG: 20 INJECTION, SOLUTION EPIDURAL; INFILTRATION; INTRACAUDAL; PERINEURAL at 12:00

## 2025-03-19 RX ADMIN — MORPHINE SULFATE 2 MG: 2 INJECTION, SOLUTION INTRAMUSCULAR; INTRAVENOUS at 12:05

## 2025-03-19 RX ADMIN — ENOXAPARIN SODIUM 40 MG: 100 INJECTION SUBCUTANEOUS at 09:08

## 2025-03-19 RX ADMIN — FAMOTIDINE 40 MG: 20 TABLET, FILM COATED ORAL at 18:25

## 2025-03-19 RX ADMIN — Medication 300 MCG: at 10:06

## 2025-03-19 RX ADMIN — NOREPINEPHRINE BITARTRATE 2 MCG: 1 INJECTION, SOLUTION, CONCENTRATE INTRAVENOUS at 10:41

## 2025-03-19 RX ADMIN — Medication 10 ML: at 20:46

## 2025-03-19 RX ADMIN — Medication 300 MCG: at 09:41

## 2025-03-19 RX ADMIN — INDOCYANINE GREEN AND WATER 2.5 MG: KIT at 10:13

## 2025-03-19 RX ADMIN — SUGAMMADEX 200 MG: 100 INJECTION, SOLUTION INTRAVENOUS at 11:55

## 2025-03-19 RX ADMIN — SODIUM CHLORIDE, POTASSIUM CHLORIDE, SODIUM LACTATE AND CALCIUM CHLORIDE 1000 ML: 600; 310; 30; 20 INJECTION, SOLUTION INTRAVENOUS at 08:09

## 2025-03-19 RX ADMIN — METOPROLOL TARTRATE 2.5 MG: 5 INJECTION INTRAVENOUS at 12:03

## 2025-03-19 RX ADMIN — CYCLOBENZAPRINE HYDROCHLORIDE 5 MG: 5 TABLET, FILM COATED ORAL at 18:25

## 2025-03-19 RX ADMIN — ACETAMINOPHEN TAB 500 MG 1000 MG: 500 TAB at 18:25

## 2025-03-19 RX ADMIN — CEFOXITIN SODIUM 2 G: 1 POWDER, FOR SOLUTION INTRAVENOUS at 11:47

## 2025-03-19 RX ADMIN — Medication 200 MCG: at 10:29

## 2025-03-19 RX ADMIN — Medication 100 MCG: at 09:58

## 2025-03-19 RX ADMIN — FENTANYL CITRATE 100 MCG: 50 INJECTION, SOLUTION INTRAMUSCULAR; INTRAVENOUS at 11:01

## 2025-03-19 RX ADMIN — ROCURONIUM BROMIDE 50 MG: 10 INJECTION, SOLUTION INTRAVENOUS at 10:57

## 2025-03-19 RX ADMIN — CALCIUM CHLORIDE 0.2 G: 100 INJECTION INTRAVENOUS; INTRAVENTRICULAR at 10:31

## 2025-03-19 RX ADMIN — FENTANYL CITRATE 100 MCG: 50 INJECTION, SOLUTION INTRAMUSCULAR; INTRAVENOUS at 09:10

## 2025-03-19 RX ADMIN — Medication 200 MCG: at 10:24

## 2025-03-19 RX ADMIN — LIDOCAINE HYDROCHLORIDE 100 MG: 20 INJECTION, SOLUTION EPIDURAL; INFILTRATION; INTRACAUDAL; PERINEURAL at 09:10

## 2025-03-19 RX ADMIN — CALCIUM CHLORIDE 0.4 G: 100 INJECTION INTRAVENOUS; INTRAVENTRICULAR at 10:51

## 2025-03-19 RX ADMIN — CALCIUM CHLORIDE 0.2 G: 100 INJECTION INTRAVENOUS; INTRAVENTRICULAR at 10:27

## 2025-03-19 RX ADMIN — ACETAMINOPHEN 650 MG: 325 TABLET ORAL at 08:09

## 2025-03-19 RX ADMIN — GABAPENTIN 300 MG: 300 CAPSULE ORAL at 08:09

## 2025-03-19 RX ADMIN — SODIUM CHLORIDE, SODIUM LACTATE, POTASSIUM CHLORIDE, CALCIUM CHLORIDE 50 ML/HR: 20; 30; 600; 310 INJECTION, SOLUTION INTRAVENOUS at 15:24

## 2025-03-19 RX ADMIN — SODIUM CHLORIDE, POTASSIUM CHLORIDE, SODIUM LACTATE AND CALCIUM CHLORIDE: 600; 310; 30; 20 INJECTION, SOLUTION INTRAVENOUS at 11:45

## 2025-03-19 RX ADMIN — SUGAMMADEX 200 MG: 100 INJECTION, SOLUTION INTRAVENOUS at 12:03

## 2025-03-19 RX ADMIN — KETOROLAC TROMETHAMINE 15 MG: 30 INJECTION, SOLUTION INTRAMUSCULAR; INTRAVENOUS at 20:46

## 2025-03-19 RX ADMIN — CALCIUM CHLORIDE 0.2 G: 100 INJECTION INTRAVENOUS; INTRAVENTRICULAR at 10:29

## 2025-03-19 RX ADMIN — SODIUM CHLORIDE 2 G: 900 INJECTION INTRAVENOUS at 09:20

## 2025-03-19 RX ADMIN — FENTANYL CITRATE 50 MCG: 50 INJECTION, SOLUTION INTRAMUSCULAR; INTRAVENOUS at 11:15

## 2025-03-19 RX ADMIN — CELECOXIB 200 MG: 200 CAPSULE ORAL at 08:09

## 2025-03-19 NOTE — ANESTHESIA PREPROCEDURE EVALUATION
Anesthesia Evaluation                  Airway   Dental      Pulmonary    (+) a smoker Former, asthma,  Cardiovascular     PT is on anticoagulation therapy    (+) hypertension, dysrhythmias Atrial Fib, Atrial Flutter    ROS comment: ·  Left ventricular systolic function is normal. Left ventricular ejection fraction appears to be 61 - 65%.  ·  Left ventricular wall thickness is consistent with mild concentric hypertrophy.  ·  The right ventricular cavity is mild to moderately dilated. Normal right ventricular systolic function noted.  ·  There is mild calcification of the aortic valve. No aortic valve regurgitation is present. No hemodynamically significant aortic valve stenosis is present.  ·  No mitral valve regurgitation or significant stenosis is present. Mild mitral annular calcification is present.      Neuro/Psych  GI/Hepatic/Renal/Endo      Musculoskeletal     Abdominal    Substance History   (+) drug use     OB/GYN          Other      history of cancer                      Anesthesia Plan    ASA 3     general     (Recent afib rvr     Note from 2/11: Patient is an overall increase surgical risk given current uncontrolled atrial flutter with RVR.  Recommend adequate rate control of arrhythmia prior to surgery unless it is considered emergent.      Discussed; anticoagulant held )  intravenous induction     Anesthetic plan, risks, benefits, and alternatives have been provided, discussed and informed consent has been obtained with: patient.        CODE STATUS:

## 2025-03-19 NOTE — H&P
GENERAL SURGERY HISTORY AND PHYSICAL    Patient Name:  Kodi Stark  YOB: 1950  MRN: [id number]    Patient Care Team:  Holly Montoya MD as PCP - General (Internal Medicine)    Date of Admission: 03/19/25    Admitting Physician: Kodi Cadena MD    Reason for Admission: Ascending colon adenomatous polyp with high-grade dysplasia    History of Present Illness  Kodi Stark is a 75 y.o. male with a recent diagnosis of A-fib RVR on Eliquis presents for resection of an ascending colon adenomatous polyp with high-grade dysplasia.  Subsequently after his office visit, he was admitted for A-fib RVR and was started on Eliquis.  Since then, he has been doing well overall.  His last dose of Eliquis was on Sunday.    Allergies   No Known Allergies    Medications  ceFOXitin, 2 g, Intravenous, Once      lactated ringers, 1,000 mL, Last Rate: 1,000 mL (03/19/25 0809)  lactated ringers, 1,000 mL, Last Rate: 1,000 mL (03/19/25 0809)      No current facility-administered medications on file prior to encounter.     Current Outpatient Medications on File Prior to Encounter   Medication Sig    lisinopril (PRINIVIL,ZESTRIL) 40 MG tablet Take 1 tablet by mouth Daily.    simvastatin (ZOCOR) 10 MG tablet Take 1 tablet by mouth 3 (Three) Times a Week. Ordered daily, but pt takes every 3-4 days due to muscle cramps       History  Past Medical History:   Diagnosis Date    Abnormal ECG     Adenomatous colon polyp 2023    POSITIVE FOR CANCER AND REMOVED VIA COLONOSCOPY IN 2023    Arrhythmia 2/11/25    Pre-op    Asthma     Upon exertion    Cancer 5/23    1 colon polyp    Colon polyp     High cholesterol     Hypertension     New onset a-fib 02/11/2025    WITH RVR    Tubular adenoma of colon 02/04/2025    of the ascending colon with high grade dysplasia   ,   Past Surgical History:   Procedure Laterality Date    COLONOSCOPY       Family History   Problem Relation Age of Onset    Hypertension Mother      Social  History     Tobacco Use    Smoking status: Former     Current packs/day: 0.00     Average packs/day: 1.5 packs/day for 40.0 years (60.0 ttl pk-yrs)     Types: Cigarettes     Start date:      Quit date:      Years since quittin.2     Passive exposure: Never    Smokeless tobacco: Never   Vaping Use    Vaping status: Never Used   Substance Use Topics    Alcohol use: Yes     Alcohol/week: 24.0 standard drinks of alcohol     Types: 24 Cans of beer per week     Comment: DAILY BEER COUPLE A DAY    Drug use: Not Currently     The patient lives in [place]    Current Facility-Administered Medications:     cefOXItin (MEFOXIN) 2 g in sodium chloride 0.9 % 100 mL MBP, 2 g, Intravenous, Once, Kodi Cadena MD    lactated ringers infusion 1,000 mL, 1,000 mL, Intravenous, Continuous, Kodi Cadena MD, Last Rate: 25 mL/hr at 25 0809, 1,000 mL at 25 0809    lactated ringers infusion 1,000 mL, 1,000 mL, Intravenous, Continuous, Kodi Cadena MD, Last Rate: 25 mL/hr at 25 0809, 1,000 mL at 25 0809    lidocaine PF 1% (XYLOCAINE) injection 0.5 mL, 0.5 mL, Intradermal, Once PRN, Kodi Cadena MD    sodium chloride 0.9 % flush 10 mL, 10 mL, Intravenous, PRNSurinder John H, MD    sodium chloride 0.9 % flush 3 mL, 3 mL, Intravenous, PRNSurinder John H, MD    Review of Systems  A comprehensive 14 point review of systems was performed and is negative unless otherwise noted.     Vital Signs  /89 (BP Location: Left arm, Patient Position: Sitting)   Pulse 89   Temp 97.4 °F (36.3 °C) (Temporal)   Resp 16   Wt 110 kg (243 lb 6.2 oz)   SpO2 99%   BMI 33.01 kg/m²   Body mass index is 33.01 kg/m².   No intake or output data in the 24 hours ending 25 0837    Physical Exam  Constitutional:       Appearance: Normal appearance. He is normal weight.      Comments:  , in no acute distress. Normal development, normal body habitus. Well nourished   HENT:      Head: Normocephalic and atraumatic.       Right Ear: External ear normal.      Left Ear: External ear normal.      Ears:      Comments: Hearing intact     Nose: Nose normal.      Comments: Nares patent, no septal deviation, no drainage     Mouth/Throat:      Comments: Airway patent, dentition intact, mucus membranes moist  Eyes:      Extraocular Movements: Extraocular movements intact.      Conjunctiva/sclera: Conjunctivae normal.      Pupils: Pupils are equal, round, and reactive to light.      Comments: External eyelids grossly normal, vision intact, no scleral icterus   Neck:      Comments: Trachea midline  Cardiovascular:      Rate and Rhythm: Normal rate.      Comments: Normotensive, no JVD bilaterally  Pulmonary:      Effort: Pulmonary effort is normal.      Breath sounds: Normal breath sounds.      Comments: Normal respiratory rate  Abdominal:      General: Abdomen is flat.      Palpations: Abdomen is soft.      Comments: Non tender. No scars, hernias or masses.   Musculoskeletal:         General: Normal range of motion.      Cervical back: Normal range of motion.   Skin:     General: Skin is warm and dry.      Comments: Skin color is consistent with ethnicity   Neurological:      General: No focal deficit present.      Mental Status: He is alert and oriented to person, place, and time.   Psychiatric:         Mood and Affect: Mood normal.         Behavior: Behavior normal.         Thought Content: Thought content normal.         Judgment: Judgment normal.      Comments: Patient understands disease process and has decision making capacity.          Results:  Labs:  I personally reviewed all pertinent labs.   Imaging:  I personally reviewed all pertinent imaging studies.   Pathology:      ASSESSMENT AND PLAN    Active Hospital Problems    Diagnosis     Tubular adenoma of colon     High grade dysplasia in colonic adenoma        Kodi Stark is a 75 y.o. male with ascending/right colon adenomatous polyp with high-grade dysplasia.  He is here for surgery  and will be admitted postoperatively.  Prior note indicated that he was scheduled for a left colectomy, however this is incorrect.  Records review shows a ascending/right colon polyp therefore he will be undergoing a right colectomy.  This has been discussed with the patient and the OR staff.  Prior H&P and consent has been updated.  The patient has been marked on the right side.    Plan for laparoscopic robotic assisted right colectomy.  Cefoxitin 2 g.    I discussed the risks, benefits and alternatives to surgery including the risk of injury to surrounding structures including the ureter and bladder, anastomotic leak, uncontrolled bleeding, deep organ space and superficial skin infection, long term drain and/or wound care, the possibility of converting to open surgery, incisional hernias, cancer diagnosis and possible recurrent, chronic pain and the need for more operations or procedures in the future. Additionally, I counseled the patient on the risk of postoperative cardiopulmonary complications. I gave the patient a chance to ask any questions and answered them thoroughly. The patient understood the risks and was agreeable to proceeding to surgery. Consent was obtained from the patient.       I discussed the patient's findings and my recommendations with the patient and/or family, as well as the nursing staff and consulting teams.    Kodi Cadena MD  General Surgery  3/19/2025  08:37 CDT    Please note that portions of this note were completed with a voice recognition program.

## 2025-03-19 NOTE — OP NOTE
OPERATIVE NOTE    Patient Name:  Kodi Stark  YOB: 1950  MRN:  [id number]    Date of Surgery:  3/19/2025    PREOPERATIVE DIAGNOSIS: Ascending colon adenomatous polyp with high-grade dysplasia, obesity BMI 33    POSTOPERATIVE DIAGNOSIS: Same    PROCEDURE(S): Laparoscopic robotic assisted right colectomy anastomosis     ATTENDING SURGEON: Kodi Cadena MD    ADDITIONAL SURGEON: None    ASSISTANT(S): Lucy Bruce APRN, Daphnie GARSIA, Antoinette GARSIA     SPECIMENS:   ID Type Source Tests Collected by Time   A :  Tissue Large Intestine, Right / Ascending Colon TISSUE PATHOLOGY EXAM Kodi Cadena MD 3/19/2025 1121       ANESTHESIA: General endotracheal anesthesia with TAP block    ESTIMATED BLOOD LOSS: 20 mL    FLUIDS: 1500  mL    WOUND CLASSIFICATION: Class 2, clean contaminated    IMPLANTS: None    TUBES/DRAINS: None    FINDINGS:   Tattoo markings present at the mid ascending colon and the transverse colon.  Anatomic right colectomy performed with high ligation of the ileocolic vascular pedicle.  Side-to-side antiperistaltic stapled anastomosis.  Mesenteric rent closed using a running V-Loc suture.    INDICATIONS: The patient is a 75 y.o. male with an ascending colon adenomatous polyp with high-grade dysplasia was incompletely resected endoscopically    DESCRIPTION OF PROCEDURE:   The patient was seen in the preoperative holding area. Informed consent was obtained from the patient. The patient was taken to the operating room and placed on the operating room table in the supine position. SCDs were placed on both legs. General anesthesia was administered and the patient was intubated without difficulty.  Cefoxitin 2 g was administered for preoperative antibiotics.  The patient was prepped and draped in the usual sterile fashion.  A Sheridan catheter was sterilely placed.  A full surgical timeout was performed verifying the correct patient, correct procedure and correct site for surgery.     Local anesthesia was  infiltrated in the left upper quadrant at Carvajal's point. A small skin incision was made using an 11 blade scalpel and a Veress needle was inserted with 2 satisfactory clicks. An excellent saline drop test confirmed correct intra-abdominal placement. Pneumoperitoneum was initiated to 15 mmHg.  Local anesthesia was injected at left hypogastrium.  A small incision was made and a robotic 8 mm Optiview trocar was inserted under direct visualization. Safe abdominal entry was confirmed after examining the peritoneal cavity. The Veress needle stick site was examined and was hemostatic.  A bilateral tap block was performed.  3 additional incisions were made along the left flank and trocars were inserted under direct visualization.  The robot was docked and instruments were inserted.  The patient was placed in steep reverse Trendelenburg.  Starting at the mid transverse colon, the gastrocolic ligament was divided.  The lesser sac was entered.  The colon was mobilized from lateral to medial proximally to the cecum.  The cecum was elevated and the ileocolic vascular pedicle was skeletonized.  A clip was placed down low and a high ligation of the ileocolic pedicle was performed.  Once the small bowel mesentery was divided approximately 10 cm proximal to the ileocecal valve, the small bowel was staple resected.  Just right of the right branch of the middle colic artery, the transverse colon was staple resected and the colon mesentery was divided distally to the hepatic flexure.  The colon was then delivered out of the retroperitoneum and the hepatic flexure was mobilized.  Care was taken to identify the duodenum which was swept down inferiorly and preserved.  Once the specimen was completely mobilized and resected, a small upper midline laparotomy incision was made.  A wound protector was inserted into the incision and the colon specimen was sent to pathology.  The left-sided 12 mm trocar site was closed using a 0 Vicryl suture  in a figure-of-eight fashion.  The colon mesentery was carefully examined.  There was no bleeding from the raw surfaces.  Gelfoam was placed over the raw surfaces with good hemostasis.  There was excellent length of small bowel to make up to the transverse colon.  A side-to-side antiperistaltic staple anastomosis was performed with a stapled closure of the backwall of the common channel.  The corners of the suture line were oversewn including a crotch stitch placed.  The mesentery of the ileocolonic anastomosis was run closed using an absorbable 3-0 V-Loc suture.  The anastomosis was widely patent and hemostatic.  There was an excellent blood supply, noted by the brisk bleeding during resection.  After a preliminary count was correct, and after a glove gown and drape change was performed, the fascia was closed using a #1 PDS suture x 2 in a running fashion.  The deep dermal layer of the midline incision was reapproximated using a running 3-0 Vicryl suture.  The skin was closed using a 4-0 Vicryl suture in running subcuticular fashion.  The trocar sites were also closed with 4-0 Vicryl sutures.  The incisions were dressed with Mastisol and Steri-Strips.    At the completion of the procedure, all sharp, sponge and instrument counts were correct x2. The patient was awoken from anesthesia and extubated in the operating room.  The Sheridan catheter was removed.  The patient was taken to the postanesthesia care unit in stable condition without any immediate complications    This procedure was not performed to treat colon cancer through resection        Kodi Cadena MD  3/19/2025  12:40 CDT    Please note that portions of this note were completed with a voice recognition program.

## 2025-03-19 NOTE — PLAN OF CARE
Goal Outcome Evaluation:           Progress: improving        VSS, on 2L NC, a/o x 4.  Abdominal dressings c/d/I.  Pain medication given x 2, see mar.  SCDS for VTE prevention.  Pt ambulating to bathroom with daughter's assistance.  Tolerated full liquids well.  No complaints of n/v.  Will continue to monitor, safety maintained.

## 2025-03-19 NOTE — ANESTHESIA POSTPROCEDURE EVALUATION
Patient: Kodi Stark    Procedure Summary       Date: 03/19/25 Room / Location: [facility] OR  /  [facility] OR    Anesthesia Start: 0904 Anesthesia Stop: 1214    Procedure: LAPAROSCOPIC RIGHT COLECTOMY WITH DAVINCI ROBOT (MINIMALLY INVASIVE COLON RESECTION), CONVERSION TO OPEN (Right: Abdomen) Diagnosis:       Tubular adenoma of colon      High grade dysplasia in colonic adenoma      (Tubular adenoma of colon [D12.6])      (High grade dysplasia in colonic adenoma [D12.6])    Surgeons: Kodi Cadena MD Provider: Kishore Luibn CRNA    Anesthesia Type: general ASA Status: 3            Anesthesia Type: general    Vitals  Vitals Value Taken Time   BP 92/68 03/19/25 13:25   Temp 98.6 °F (37 °C) 03/19/25 12:10   Pulse 79 03/19/25 13:25   Resp 18 03/19/25 13:20   SpO2 99 % 03/19/25 13:25           Post Anesthesia Care and Evaluation    Patient location during evaluation: PACU  Patient participation: complete - patient participated  Level of consciousness: awake and awake and alert  Pain score: 0  Pain management: adequate    Airway patency: patent  Anesthetic complications: No anesthetic complications  PONV Status: none  Cardiovascular status: acceptable  Respiratory status: acceptable  Hydration status: acceptable    Comments: Patient discharged according to acceptable Jax score per RN assessment. See nursing records for further information.     Blood pressure 99/64, pulse 93, temperature 97.6 °F (36.4 °C), temperature source Oral, resp. rate 16, weight 110 kg (243 lb 6.2 oz), SpO2 97%.

## 2025-03-20 PROCEDURE — 25010000002 KETOROLAC TROMETHAMINE PER 15 MG: Performed by: SURGERY

## 2025-03-20 PROCEDURE — 25010000002 HYDROMORPHONE 1 MG/ML SOLUTION: Performed by: SURGERY

## 2025-03-20 PROCEDURE — 93005 ELECTROCARDIOGRAM TRACING: CPT | Performed by: SURGERY

## 2025-03-20 PROCEDURE — 25810000003 LACTATED RINGERS PER 1000 ML: Performed by: SURGERY

## 2025-03-20 PROCEDURE — 99024 POSTOP FOLLOW-UP VISIT: CPT | Performed by: SURGERY

## 2025-03-20 PROCEDURE — 25010000002 ENOXAPARIN PER 10 MG: Performed by: SURGERY

## 2025-03-20 PROCEDURE — 93010 ELECTROCARDIOGRAM REPORT: CPT | Performed by: STUDENT IN AN ORGANIZED HEALTH CARE EDUCATION/TRAINING PROGRAM

## 2025-03-20 RX ORDER — SODIUM CHLORIDE, SODIUM LACTATE, POTASSIUM CHLORIDE, CALCIUM CHLORIDE 600; 310; 30; 20 MG/100ML; MG/100ML; MG/100ML; MG/100ML
50 INJECTION, SOLUTION INTRAVENOUS CONTINUOUS
Status: DISCONTINUED | OUTPATIENT
Start: 2025-03-20 | End: 2025-03-21

## 2025-03-20 RX ORDER — DILTIAZEM HYDROCHLORIDE 5 MG/ML
5 INJECTION INTRAVENOUS ONCE
Status: COMPLETED | OUTPATIENT
Start: 2025-03-20 | End: 2025-03-20

## 2025-03-20 RX ADMIN — CYCLOBENZAPRINE HYDROCHLORIDE 5 MG: 10 TABLET, FILM COATED ORAL at 13:45

## 2025-03-20 RX ADMIN — GABAPENTIN 100 MG: 100 CAPSULE ORAL at 23:05

## 2025-03-20 RX ADMIN — ACETAMINOPHEN TAB 500 MG 1000 MG: 500 TAB at 10:20

## 2025-03-20 RX ADMIN — ACETAMINOPHEN TAB 500 MG 1000 MG: 500 TAB at 00:17

## 2025-03-20 RX ADMIN — OXYCODONE HYDROCHLORIDE 10 MG: 10 TABLET ORAL at 19:31

## 2025-03-20 RX ADMIN — CYCLOBENZAPRINE HYDROCHLORIDE 5 MG: 10 TABLET, FILM COATED ORAL at 23:06

## 2025-03-20 RX ADMIN — DILTIAZEM HYDROCHLORIDE 240 MG: 240 CAPSULE, EXTENDED RELEASE ORAL at 10:19

## 2025-03-20 RX ADMIN — KETOROLAC TROMETHAMINE 15 MG: 30 INJECTION, SOLUTION INTRAMUSCULAR; INTRAVENOUS at 02:13

## 2025-03-20 RX ADMIN — KETOROLAC TROMETHAMINE 15 MG: 30 INJECTION, SOLUTION INTRAMUSCULAR; INTRAVENOUS at 13:44

## 2025-03-20 RX ADMIN — ENOXAPARIN SODIUM 40 MG: 100 INJECTION SUBCUTANEOUS at 10:22

## 2025-03-20 RX ADMIN — KETOROLAC TROMETHAMINE 15 MG: 30 INJECTION, SOLUTION INTRAMUSCULAR; INTRAVENOUS at 10:20

## 2025-03-20 RX ADMIN — GABAPENTIN 100 MG: 100 CAPSULE ORAL at 13:45

## 2025-03-20 RX ADMIN — CYCLOBENZAPRINE HYDROCHLORIDE 5 MG: 10 TABLET, FILM COATED ORAL at 00:17

## 2025-03-20 RX ADMIN — KETOROLAC TROMETHAMINE 15 MG: 30 INJECTION, SOLUTION INTRAMUSCULAR; INTRAVENOUS at 20:57

## 2025-03-20 RX ADMIN — ACETAMINOPHEN TAB 500 MG 1000 MG: 500 TAB at 19:31

## 2025-03-20 RX ADMIN — FAMOTIDINE 40 MG: 20 TABLET, FILM COATED ORAL at 10:19

## 2025-03-20 RX ADMIN — SODIUM CHLORIDE, SODIUM LACTATE, POTASSIUM CHLORIDE, CALCIUM CHLORIDE 50 ML/HR: 20; 30; 600; 310 INJECTION, SOLUTION INTRAVENOUS at 12:22

## 2025-03-20 RX ADMIN — HYDROMORPHONE HYDROCHLORIDE 0.5 MG: 1 INJECTION, SOLUTION INTRAMUSCULAR; INTRAVENOUS; SUBCUTANEOUS at 21:19

## 2025-03-20 RX ADMIN — LIDOCAINE 1 PATCH: 4 PATCH TOPICAL at 10:23

## 2025-03-20 RX ADMIN — CYCLOBENZAPRINE HYDROCHLORIDE 5 MG: 10 TABLET, FILM COATED ORAL at 05:59

## 2025-03-20 RX ADMIN — DILTIAZEM HYDROCHLORIDE 5 MG: 5 INJECTION INTRAVENOUS at 09:15

## 2025-03-20 RX ADMIN — GABAPENTIN 100 MG: 100 CAPSULE ORAL at 05:59

## 2025-03-20 NOTE — PLAN OF CARE
Goal Outcome Evaluation:  Plan of Care Reviewed With: patient        Progress: no change     Patient with minimal complaints of pain so far during shift; see MAR. IVF infusing per order. Midline incision and lap x4 sites c/d/I. Faint bowel sounds. Tolerating full liquid diet. Room air. Irregular HR noted at the beginning of the shift. After morning walk patients HR jumped up to 160s and was sustaining in Afib in the 120s; MD notified. Safety maintained.

## 2025-03-20 NOTE — PROGRESS NOTES
GENERAL SURGERY OFFICE FOLLOW UP VISIT    Referring Provider: No ref. provider found  Primary Care Provider: Holly Montoya MD    Chief Complaint   Patient presents with    Follow-up       Subjective     Mr. Stark returns to clinic for his final preop visit.  The patient was recently hospitalized for a new diagnosis of A-fib RVR.  He was placed on diltiazem extended release and Eliquis.  Overall, he feels as though he is doing well.  No chest pain, shortness of breath or palpitations.  No bleeding issues.    History:  Past Medical History:   Diagnosis Date    Abnormal ECG     Adenomatous colon polyp     POSITIVE FOR CANCER AND REMOVED VIA COLONOSCOPY IN     Arrhythmia 25    Pre-op    Asthma     Upon exertion    Cancer     1 colon polyp    Colon polyp     High cholesterol     Hypertension     New onset a-fib 2025    WITH RVR    Tubular adenoma of colon 2025    of the ascending colon with high grade dysplasia   ,   Past Surgical History:   Procedure Laterality Date    COLON RESECTION Right 3/19/2025    Procedure: LAPAROSCOPIC RIGHT COLECTOMY WITH DAVINCI ROBOT (MINIMALLY INVASIVE COLON RESECTION), CONVERSION TO OPEN;  Surgeon: Kodi Cadena MD;  Location: [facility];  Service: Robotics - DaVinci;  Laterality: Right;    COLONOSCOPY     ,   Family History   Problem Relation Age of Onset    Hypertension Mother    ,   Social History     Tobacco Use    Smoking status: Former     Current packs/day: 0.00     Average packs/day: 1.5 packs/day for 40.0 years (60.0 ttl pk-yrs)     Types: Cigarettes     Start date:      Quit date:      Years since quittin.2     Passive exposure: Never    Smokeless tobacco: Never   Vaping Use    Vaping status: Never Used   Substance Use Topics    Alcohol use: Yes     Alcohol/week: 24.0 standard drinks of alcohol     Types: 24 Cans of beer per week     Comment: DAILY BEER COUPLE A DAY    Drug use: Not Currently     No current  facility-administered medications for this visit.  No current outpatient medications on file.    Facility-Administered Medications Ordered in Other Visits:     acetaminophen (TYLENOL) tablet 1,000 mg, 1,000 mg, Oral, Q8H, Kodi Cadena MD, 1,000 mg at 03/20/25 1020    [Held by provider] apixaban (ELIQUIS) tablet 5 mg, 5 mg, Oral, BID, Kodi Cadena MD    sennosides-docusate (PERICOLACE) 8.6-50 MG per tablet 2 tablet, 2 tablet, Oral, BID PRN **AND** polyethylene glycol (MIRALAX) packet 17 g, 17 g, Oral, Daily PRN **AND** bisacodyl (DULCOLAX) EC tablet 5 mg, 5 mg, Oral, Daily PRN **AND** bisacodyl (DULCOLAX) suppository 10 mg, 10 mg, Rectal, Daily PRN, Kodi Cadena MD    Calcium Replacement - Follow Nurse / BPA Driven Protocol, , Not Applicable, PRN, Kodi Cadena MD    cyclobenzaprine (FLEXERIL) tablet 5 mg, 5 mg, Oral, TID, Kodi Cadena MD, 5 mg at 03/20/25 0559    dilTIAZem CD (CARDIZEM CD) 24 hr capsule 240 mg, 240 mg, Oral, Q24H, Kodi Cadena MD, 240 mg at 03/20/25 1019    enoxaparin sodium (LOVENOX) syringe 40 mg, 40 mg, Subcutaneous, Daily, Kodi Cadena MD, 40 mg at 03/20/25 1022    famotidine (PEPCID) tablet 40 mg, 40 mg, Oral, Daily, Kodi Cadena MD, 40 mg at 03/20/25 1019    gabapentin (NEURONTIN) capsule 100 mg, 100 mg, Oral, Q8H, Kodi Cadena MD, 100 mg at 03/20/25 0559    HYDROmorphone (DILAUDID) injection 0.5 mg, 0.5 mg, Intravenous, Q4H PRN, Kodi Cadena MD, 0.5 mg at 03/19/25 1459    ketorolac (TORADOL) injection 15 mg, 15 mg, Intravenous, Q6H, Kodi Cadena MD, 15 mg at 03/20/25 1020    lactated ringers infusion, 50 mL/hr, Intravenous, Continuous, Kodi Cadena MD, Last Rate: 50 mL/hr at 03/19/25 1524, 50 mL/hr at 03/19/25 1524    lactated ringers infusion, 50 mL/hr, Intravenous, Continuous, Kodi Cadena MD, Last Rate: 50 mL/hr at 03/20/25 0927, 50 mL/hr at 03/20/25 0927    Lidocaine 4 % 1 patch, 1 patch, Transdermal, Q24H, Kodi Cadena MD, 1 patch at 03/20/25 1023    [Held by provider]  "lisinopril (PRINIVIL,ZESTRIL) tablet 40 mg, 40 mg, Oral, Daily, Kodi Cadena MD    Magnesium Standard Dose Replacement - Follow Nurse / BPA Driven Protocol, , Not Applicable, Surinder COOK John H, MD    ondansetron ODT (ZOFRAN-ODT) disintegrating tablet 4 mg, 4 mg, Oral, Q6H PRN **OR** ondansetron (ZOFRAN) injection 4 mg, 4 mg, Intravenous, Q6H PRN, Kodi Cadena MD    oxyCODONE (ROXICODONE) immediate release tablet 10 mg, 10 mg, Oral, Q4H PRN, Kodi Cadena MD, 10 mg at 03/19/25 1825    oxyCODONE (ROXICODONE) immediate release tablet 5 mg, 5 mg, Oral, Q4H PRN, Kodi Cadena MD    Phosphorus Replacement - Follow Nurse / BPA Driven Protocol, , Not Applicable, Surinder COOK John H, MD    Potassium Replacement - Follow Nurse / BPA Driven Protocol, , Not Applicable, Surinder COOK John H, MD    prochlorperazine (COMPAZINE) injection 5 mg, 5 mg, Intravenous, Q6H PRN, Kodi Cadena MD    sodium chloride 0.9 % flush 10 mL, 10 mL, Intravenous, Q12H, Kodi Cadena MD, 10 mL at 03/19/25 2046    sodium chloride 0.9 % flush 10 mL, 10 mL, Intravenous, PRSurinder DUDLEY John H, MD    sodium chloride 0.9 % infusion 40 mL, 40 mL, Intravenous, Surinder COOK John H, MD    Allergies:  Patient has no known allergies.      The following portions of the patient's history were reviewed and updated as appropriate: allergies, current medications, past family history, past medical history, past social history, past surgical history, and problem list.      Review of Systems  A comprehensive 14 point review of systems was performed and is negative unless otherwise noted      Objective   /68   Pulse 80   Ht 182.9 cm (72\")   Wt 113 kg (249 lb)   SpO2 92%   BMI 33.77 kg/m²     BMI followup discussion/instruction with patient: continue with current weight loss program, educational material, exercise counseling, nutrition counseling, and Information on healthy weight added to patient's after visit summary.      Physical Exam  Constitutional:       " Appearance: Normal appearance. He is normal weight.      Comments: Pleasant elderly male, in no acute distress. Normal development, obese body habitus. Well nourished   HENT:      Head: Normocephalic and atraumatic.      Right Ear: External ear normal.      Left Ear: External ear normal.      Ears:      Comments: Hearing intact     Nose: Nose normal.      Comments: Nares patent, no septal deviation, no drainage     Mouth/Throat:      Comments: Airway patent, dentition intact, mucus membranes moist  Eyes:      Extraocular Movements: Extraocular movements intact.      Conjunctiva/sclera: Conjunctivae normal.      Pupils: Pupils are equal, round, and reactive to light.      Comments: External eyelids grossly normal, vision intact, no scleral icterus   Neck:      Comments: Trachea midline  Cardiovascular:      Rate and Rhythm: Normal rate.      Comments: Normotensive, no JVD bilaterally  Pulmonary:      Effort: Pulmonary effort is normal.      Breath sounds: Normal breath sounds.      Comments: Normal respiratory rate  Abdominal:      Comments: Obese, soft, non tender. No scars, hernias or masses.   Musculoskeletal:         General: Normal range of motion.      Cervical back: Normal range of motion.   Skin:     General: Skin is warm and dry.      Comments: Skin color is consistent with ethnicity   Neurological:      General: No focal deficit present.      Mental Status: He is alert and oriented to person, place, and time.   Psychiatric:         Mood and Affect: Mood normal.         Behavior: Behavior normal.         Thought Content: Thought content normal.         Judgment: Judgment normal.      Comments: Patient understands disease process and has decision making capacity.            Labs:  I personally reviewed all pertinent labs.   Imaging: I personally reviewed all pertinent imaging studies.   Pathology:      Assessment & Plan   Diagnoses and all orders for this visit:    1. Atrial fibrillation with rapid  ventricular response (Primary)    2. High grade dysplasia in colonic adenoma    3. Dysplastic colon polyp    4. On continuous oral anticoagulation    Other orders  -     Cancel: Ambulatory Referral to Cardiothoracic Surgery    75-year-old male with a new diagnosis of A-fib currently rate controlled on Cardizem and anticoagulated on Eliquis.  We will plan on holding Eliquis for 48 hours prior to surgery.  Admit postoperatively.     Plan for laparoscopic robotic assisted right colectomy.      Preop labs to include a CMP, CBC, and prealbumin. Preoperative EKG and CXR. Impact AR or Ensure surgery immune nutrition preoperatively for 5 days. Neomycin and Flagyl for preoperative antibiotics, along with a MiraLAX bowel prep and clear liquids the day before surgery. ERAS protocol.  Cefoxitin 2 g every 2 hours for preoperative antibiotics.  Admit as inpatient postoperatively.    I discussed the risks, benefits and alternatives to surgery including risk of injury to surrounding structures including the ureter and bladder, anastomotic leak, uncontrolled bleeding, deep organ space and superficial skin infection, long term drain and/or wound care, the possibility of converting to open surgery, incisional hernias, cancer recurrence, chronic pain and the need for more operations or procedures in the future. Additionally, I counseled the patient on the risk of postoperative cardiopulmonary complications. I gave the patient a chance to ask any questions and answered them thoroughly. The patient understood the risks and was agreeable to proceeding to surgery. Consent was obtained from the patient.              Thank you for the referral and trusting me with the care of your patient.    Kodi Cadena MD, FACS  General Surgery  3/20/2025  12:16 CDT    Please note that portions of this note were completed with a voice recognition program.

## 2025-03-20 NOTE — PLAN OF CARE
Goal Outcome Evaluation:  Plan of Care Reviewed With: patient        Progress: improving  Outcome Evaluation: ivf cont. tolerating full liq. no flatus or stool noted yet. instructed when has bm to let staff observe. no acute distress noted. cont to monitor.

## 2025-03-20 NOTE — PROGRESS NOTES
"       GENERAL SURGERY INPATIENT PROGRESS NOTE    Patient Name:  Kodi Stark  YOB: 1950  MRN: [id number]    SUBJECTIVE    The patient had a run of A-fib RVR overnight with ambulation.  Heart rate improved this morning.  Overall feeling well.  Having some soreness at his incisions but pain is controlled.  No passage of flatus yet,  but no nausea or vomiting.  Tolerating a full liquid diet.    Allergies:  No Known Allergies    Medications:  acetaminophen, 1,000 mg, Oral, Q8H  [Held by provider] apixaban, 5 mg, Oral, BID  cyclobenzaprine, 5 mg, Oral, TID  dilTIAZem CD, 240 mg, Oral, Q24H  enoxaparin sodium, 40 mg, Subcutaneous, Daily  famotidine, 40 mg, Oral, Daily  gabapentin, 100 mg, Oral, Q8H  ketorolac, 15 mg, Intravenous, Q6H  Lidocaine, 1 patch, Transdermal, Q24H  [Held by provider] lisinopril, 40 mg, Oral, Daily  sodium chloride, 10 mL, Intravenous, Q12H    lactated ringers, 100 mL/hr  lactated ringers, 50 mL/hr, Last Rate: 50 mL/hr (03/19/25 1524)        OBJECTIVE    VITAL SIGNS  /83 (BP Location: Right arm, Patient Position: Lying)   Pulse 110   Temp 97.6 °F (36.4 °C) (Oral)   Resp 16   Ht 180 cm (70.87\")   Wt 110 kg (243 lb 6.2 oz)   SpO2 94%   BMI 34.07 kg/m²     Intake/Output Summary (Last 24 hours) at 3/20/2025 0811  Last data filed at 3/20/2025 0607  Gross per 24 hour   Intake 2650 ml   Output 375 ml   Net 2275 ml       PHYSICAL EXAM    General: Well-appearing elderly male, sitting up in bed, in no acute distress.  HEENT:  NCAT, PERRL, EOM intact bilaterally, hearing intact, nares patent  Heart:  Regular rate, normotensive, no JVD bilaterally  Lungs:  Normal respiratory effort, regular respiratory rate, no wheezing  Abdomen: Protuberant and distended, tympanic, soft, appropriately tender at his incisions which are clean dry and intact  Extremities:  No cyanosis, clubbing or edema.   Musculoskeletal:  Normal development of bilateral upper extremities. Normal development of " bilateral lower extremities.  Range of motion intact.  No evidence of trauma.  Skin:  No rashes, ecchymosis or jaundice. Dry and non-diaphoretic. Skin color is consistent with ethnicity.    RESULTS    Labs:  I personally reviewed all pertinent labs.   Imaging:  I personally reviewed all pertinent imaging studies.   Pathology:        ASSESSMENT AND PLAN    Active Hospital Problems    Diagnosis     **Dysplastic colon polyp     Tubular adenoma of colon     High grade dysplasia in colonic adenoma          DAILY CARE PLAN    Packed blowhole twice daily with iodoform gauze.  Okay to leave incision open to air.  PT ordered.  Still awaiting return of bowel function, continue on a full liquid diet.  As long as there is no evidence of bleeding, will resume therapeutic Lovenox tomorrow.  Once he is tolerating a regular diet, will switch to Eliquis prior to discharging home.    I discussed the patient's findings and my recommendations with the patient/family, as well as the primary care team.    Kodi Cadena MD, FACS  General Surgery  3/20/2025  08:11 CDT    Please note that portions of this note were completed with a voice recognition program.

## 2025-03-21 LAB
ANION GAP SERPL CALCULATED.3IONS-SCNC: 8 MMOL/L (ref 5–15)
BASOPHILS # BLD AUTO: 0.01 10*3/MM3 (ref 0–0.2)
BASOPHILS NFR BLD AUTO: 0.1 % (ref 0–1.5)
BUN SERPL-MCNC: 8 MG/DL (ref 8–23)
BUN/CREAT SERPL: 11.3 (ref 7–25)
CALCIUM SPEC-SCNC: 8.6 MG/DL (ref 8.6–10.5)
CHLORIDE SERPL-SCNC: 102 MMOL/L (ref 98–107)
CO2 SERPL-SCNC: 24 MMOL/L (ref 22–29)
CREAT SERPL-MCNC: 0.71 MG/DL (ref 0.76–1.27)
DEPRECATED RDW RBC AUTO: 42.3 FL (ref 37–54)
EGFRCR SERPLBLD CKD-EPI 2021: 95.1 ML/MIN/1.73
EOSINOPHIL # BLD AUTO: 0.05 10*3/MM3 (ref 0–0.4)
EOSINOPHIL NFR BLD AUTO: 0.7 % (ref 0.3–6.2)
ERYTHROCYTE [DISTWIDTH] IN BLOOD BY AUTOMATED COUNT: 12.3 % (ref 12.3–15.4)
GLUCOSE SERPL-MCNC: 138 MG/DL (ref 65–99)
HCT VFR BLD AUTO: 31.3 % (ref 37.5–51)
HGB BLD-MCNC: 10.6 G/DL (ref 13–17.7)
IMM GRANULOCYTES # BLD AUTO: 0.03 10*3/MM3 (ref 0–0.05)
IMM GRANULOCYTES NFR BLD AUTO: 0.4 % (ref 0–0.5)
LYMPHOCYTES # BLD AUTO: 0.75 10*3/MM3 (ref 0.7–3.1)
LYMPHOCYTES NFR BLD AUTO: 10.2 % (ref 19.6–45.3)
MAGNESIUM SERPL-MCNC: 1.8 MG/DL (ref 1.6–2.4)
MCH RBC QN AUTO: 31.5 PG (ref 26.6–33)
MCHC RBC AUTO-ENTMCNC: 33.9 G/DL (ref 31.5–35.7)
MCV RBC AUTO: 92.9 FL (ref 79–97)
MONOCYTES # BLD AUTO: 0.48 10*3/MM3 (ref 0.1–0.9)
MONOCYTES NFR BLD AUTO: 6.5 % (ref 5–12)
NEUTROPHILS NFR BLD AUTO: 6.02 10*3/MM3 (ref 1.7–7)
NEUTROPHILS NFR BLD AUTO: 82.1 % (ref 42.7–76)
NRBC BLD AUTO-RTO: 0 /100 WBC (ref 0–0.2)
PHOSPHATE SERPL-MCNC: 2.1 MG/DL (ref 2.5–4.5)
PHOSPHATE SERPL-MCNC: 3.5 MG/DL (ref 2.5–4.5)
PLATELET # BLD AUTO: 202 10*3/MM3 (ref 140–450)
PMV BLD AUTO: 9.8 FL (ref 6–12)
POTASSIUM SERPL-SCNC: 4.1 MMOL/L (ref 3.5–5.2)
QT INTERVAL: 380 MS
QTC INTERVAL: 544 MS
RBC # BLD AUTO: 3.37 10*6/MM3 (ref 4.14–5.8)
SODIUM SERPL-SCNC: 134 MMOL/L (ref 136–145)
WBC NRBC COR # BLD AUTO: 7.34 10*3/MM3 (ref 3.4–10.8)

## 2025-03-21 PROCEDURE — 25010000002 ONDANSETRON PER 1 MG: Performed by: SURGERY

## 2025-03-21 PROCEDURE — 25010000002 ENOXAPARIN PER 10 MG: Performed by: SURGERY

## 2025-03-21 PROCEDURE — 25010000002 KETOROLAC TROMETHAMINE PER 15 MG: Performed by: SURGERY

## 2025-03-21 PROCEDURE — 80048 BASIC METABOLIC PNL TOTAL CA: CPT | Performed by: SURGERY

## 2025-03-21 PROCEDURE — 85025 COMPLETE CBC W/AUTO DIFF WBC: CPT | Performed by: SURGERY

## 2025-03-21 PROCEDURE — 25810000003 SODIUM CHLORIDE 0.9 % SOLUTION: Performed by: SURGERY

## 2025-03-21 PROCEDURE — 99024 POSTOP FOLLOW-UP VISIT: CPT | Performed by: SURGERY

## 2025-03-21 PROCEDURE — 25810000003 LACTATED RINGERS PER 1000 ML: Performed by: SURGERY

## 2025-03-21 PROCEDURE — 83735 ASSAY OF MAGNESIUM: CPT | Performed by: SURGERY

## 2025-03-21 PROCEDURE — 84100 ASSAY OF PHOSPHORUS: CPT | Performed by: SURGERY

## 2025-03-21 RX ORDER — DILTIAZEM HYDROCHLORIDE 300 MG/1
300 CAPSULE, COATED, EXTENDED RELEASE ORAL
Status: DISCONTINUED | OUTPATIENT
Start: 2025-03-22 | End: 2025-03-21

## 2025-03-21 RX ORDER — GABAPENTIN 100 MG/1
100 CAPSULE ORAL 3 TIMES DAILY
Qty: 30 CAPSULE | Refills: 0 | Status: SHIPPED | OUTPATIENT
Start: 2025-03-21 | End: 2025-04-02

## 2025-03-21 RX ORDER — ACETAMINOPHEN 500 MG
1000 TABLET ORAL EVERY 8 HOURS
Status: DISCONTINUED | OUTPATIENT
Start: 2025-03-21 | End: 2025-03-21

## 2025-03-21 RX ORDER — OXYCODONE HYDROCHLORIDE 5 MG/1
5 TABLET ORAL EVERY 4 HOURS PRN
Qty: 18 TABLET | Refills: 0 | Status: SHIPPED | OUTPATIENT
Start: 2025-03-21 | End: 2025-03-24

## 2025-03-21 RX ORDER — CYCLOBENZAPRINE HCL 5 MG
5 TABLET ORAL EVERY 8 HOURS PRN
Qty: 30 TABLET | Refills: 0 | Status: SHIPPED | OUTPATIENT
Start: 2025-03-21 | End: 2025-03-31

## 2025-03-21 RX ORDER — BISACODYL 10 MG
10 SUPPOSITORY, RECTAL RECTAL 2 TIMES DAILY
Status: DISCONTINUED | OUTPATIENT
Start: 2025-03-21 | End: 2025-03-26 | Stop reason: HOSPADM

## 2025-03-21 RX ORDER — ACETAMINOPHEN 500 MG
1000 TABLET ORAL EVERY 8 HOURS
Status: DISPENSED | OUTPATIENT
Start: 2025-03-21 | End: 2025-03-24

## 2025-03-21 RX ORDER — ENOXAPARIN SODIUM 150 MG/ML
1 INJECTION SUBCUTANEOUS EVERY 12 HOURS
Status: DISCONTINUED | OUTPATIENT
Start: 2025-03-21 | End: 2025-03-21

## 2025-03-21 RX ORDER — DILTIAZEM HYDROCHLORIDE 240 MG/1
240 CAPSULE, COATED, EXTENDED RELEASE ORAL
Status: DISCONTINUED | OUTPATIENT
Start: 2025-03-22 | End: 2025-03-26 | Stop reason: HOSPADM

## 2025-03-21 RX ORDER — ENOXAPARIN SODIUM 100 MG/ML
60 INJECTION SUBCUTANEOUS ONCE
Status: DISCONTINUED | OUTPATIENT
Start: 2025-03-21 | End: 2025-03-21

## 2025-03-21 RX ORDER — GABAPENTIN 100 MG/1
100 CAPSULE ORAL EVERY 8 HOURS SCHEDULED
Status: DISCONTINUED | OUTPATIENT
Start: 2025-03-21 | End: 2025-03-21

## 2025-03-21 RX ORDER — GABAPENTIN 100 MG/1
100 CAPSULE ORAL EVERY 8 HOURS SCHEDULED
Status: DISPENSED | OUTPATIENT
Start: 2025-03-21 | End: 2025-03-24

## 2025-03-21 RX ADMIN — SODIUM PHOSPHATE, MONOBASIC, MONOHYDRATE AND SODIUM PHOSPHATE, DIBASIC, ANHYDROUS 15 MMOL: 276; 142 INJECTION, SOLUTION INTRAVENOUS at 12:17

## 2025-03-21 RX ADMIN — KETOROLAC TROMETHAMINE 15 MG: 30 INJECTION, SOLUTION INTRAMUSCULAR; INTRAVENOUS at 13:59

## 2025-03-21 RX ADMIN — CYCLOBENZAPRINE HYDROCHLORIDE 5 MG: 10 TABLET, FILM COATED ORAL at 21:25

## 2025-03-21 RX ADMIN — ONDANSETRON 4 MG: 2 INJECTION INTRAMUSCULAR; INTRAVENOUS at 23:17

## 2025-03-21 RX ADMIN — KETOROLAC TROMETHAMINE 15 MG: 30 INJECTION, SOLUTION INTRAMUSCULAR; INTRAVENOUS at 20:18

## 2025-03-21 RX ADMIN — OXYCODONE HYDROCHLORIDE 10 MG: 10 TABLET ORAL at 20:18

## 2025-03-21 RX ADMIN — BISACODYL 10 MG: 10 SUPPOSITORY RECTAL at 09:53

## 2025-03-21 RX ADMIN — LIDOCAINE 1 PATCH: 4 PATCH TOPICAL at 08:33

## 2025-03-21 RX ADMIN — APIXABAN 5 MG: 5 TABLET, FILM COATED ORAL at 20:18

## 2025-03-21 RX ADMIN — ACETAMINOPHEN TAB 500 MG 1000 MG: 500 TAB at 09:53

## 2025-03-21 RX ADMIN — FAMOTIDINE 40 MG: 20 TABLET, FILM COATED ORAL at 08:32

## 2025-03-21 RX ADMIN — KETOROLAC TROMETHAMINE 15 MG: 30 INJECTION, SOLUTION INTRAMUSCULAR; INTRAVENOUS at 08:33

## 2025-03-21 RX ADMIN — ACETAMINOPHEN TAB 500 MG 1000 MG: 500 TAB at 18:26

## 2025-03-21 RX ADMIN — Medication 10 ML: at 20:18

## 2025-03-21 RX ADMIN — Medication 10 ML: at 08:33

## 2025-03-21 RX ADMIN — CYCLOBENZAPRINE HYDROCHLORIDE 5 MG: 10 TABLET, FILM COATED ORAL at 13:59

## 2025-03-21 RX ADMIN — GABAPENTIN 100 MG: 100 CAPSULE ORAL at 21:25

## 2025-03-21 RX ADMIN — KETOROLAC TROMETHAMINE 15 MG: 30 INJECTION, SOLUTION INTRAMUSCULAR; INTRAVENOUS at 03:03

## 2025-03-21 RX ADMIN — GABAPENTIN 100 MG: 100 CAPSULE ORAL at 13:58

## 2025-03-21 RX ADMIN — SODIUM CHLORIDE, SODIUM LACTATE, POTASSIUM CHLORIDE, CALCIUM CHLORIDE 50 ML/HR: 20; 30; 600; 310 INJECTION, SOLUTION INTRAVENOUS at 08:46

## 2025-03-21 RX ADMIN — ENOXAPARIN SODIUM 40 MG: 100 INJECTION SUBCUTANEOUS at 08:34

## 2025-03-21 RX ADMIN — DILTIAZEM HYDROCHLORIDE 240 MG: 240 CAPSULE, EXTENDED RELEASE ORAL at 08:33

## 2025-03-21 RX ADMIN — CYCLOBENZAPRINE HYDROCHLORIDE 5 MG: 10 TABLET, FILM COATED ORAL at 06:45

## 2025-03-21 NOTE — CASE MANAGEMENT/SOCIAL WORK
Discharge Planning Assessment  [facility]     Patient Name: Kodi Stark  MRN: [id number]  Today's Date: 3/21/2025    Admit Date: 3/19/2025        Discharge Needs Assessment       Row Name 03/21/25 1302       Living Environment    People in Home spouse    Name(s) of People in Home Surekha Stark (Spouse)  [phone] (Home Phone)    Current Living Arrangements home    Potentially Unsafe Housing Conditions none    In the past 12 months has the electric, gas, oil, or water company threatened to shut off services in your home? No    Primary Care Provided by self    Provides Primary Care For no one    Family Caregiver if Needed child(darryl), adult;spouse    Family Caregiver Names wife Surekha....Daughter Hang Marcial [phone]    Quality of Family Relationships helpful;involved;supportive    Able to Return to Prior Arrangements yes       Resource/Environmental Concerns    Resource/Environmental Concerns none    Transportation Concerns none       Transportation Needs    In the past 12 months, has lack of transportation kept you from medical appointments or from getting medications? no    In the past 12 months, has lack of transportation kept you from meetings, work, or from getting things needed for daily living? No       Food Insecurity    Within the past 12 months, you worried that your food would run out before you got the money to buy more. Never true    Within the past 12 months, the food you bought just didn't last and you didn't have money to get more. Never true       Transition Planning    Patient/Family Anticipates Transition to home with family    Patient/Family Anticipated Services at Transition none    Transportation Anticipated family or friend will provide       Discharge Needs Assessment    Equipment Currently Used at Home none    Concerns to be Addressed denies needs/concerns at this time    Do you want help finding or keeping work or a job? I do not need or want help    Do you want help with  school or training? For example, starting or completing job training or getting a high school diploma, GED or equivalent No    Anticipated Changes Related to Illness none    Equipment Needed After Discharge none    Discharge Coordination/Progress Lives at home with wife. Daughter is helpful. No DME in use.  Has PCP and Medicare. Plans to return home. Denying any immediate needs. We will follow and assist if needed with discharge planning.                   Discharge Plan    No documentation.                   Expected Discharge Date and Time       Expected Discharge Date Expected Discharge Time    Mar 22, 2025            Demographic Summary    No documentation.                  Functional Status    No documentation.                  Psychosocial    No documentation.                  Abuse/Neglect    No documentation.                  Legal    No documentation.                  Substance Abuse    No documentation.                  Patient Forms    No documentation.                     Bina Robbins RN

## 2025-03-21 NOTE — PLAN OF CARE
Goal Outcome Evaluation:  Plan of Care Reviewed With: patient        Progress: improving  Outcome Evaluation: Patient resting in bed. Ambulated in room with 1 assist. Midline dressing intact with no drainage. Lap x4. Minimal complaints of pain. Diet advanced this evening to GI soft diet. Voiding. Supoository given with small BM today. Alert x4. Potentially home tomorrow. Safety maintained.

## 2025-03-21 NOTE — PLAN OF CARE
Goal Outcome Evaluation:  Plan of Care Reviewed With: patient  Progress: no change       Pt medicated for c/o pain with scheduled and prn meds. IVF cont per order. ML incision and lap sites intact to abd. Up with asst x1. Voiding per BRP. No c/o n/v. Remains in a fib 90's-100's. VSS. Safety maintained.

## 2025-03-21 NOTE — PROGRESS NOTES
"       GENERAL SURGERY INPATIENT PROGRESS NOTE    Patient Name:  Kodi Stark  YOB: 1950  MRN: [id number]    SUBJECTIVE    Experiencing some more abdominal pain that is adequately controlled with his current pain regimen.  Still having tachycardia into the 120s but no shortness of breath, chest discomfort or presyncope.  He is tolerating a full liquid diet but has not passed flatus yet.  He is burping up some.  No nausea or vomiting.    Allergies:  No Known Allergies    Medications:  acetaminophen, 1,000 mg, Oral, Q8H  [Held by provider] apixaban, 5 mg, Oral, BID  bisacodyl, 10 mg, Rectal, BID  cyclobenzaprine, 5 mg, Oral, TID  [START ON 3/22/2025] dilTIAZem CD, 300 mg, Oral, Q24H  enoxaparin sodium, 1 mg/kg, Subcutaneous, Q12H  famotidine, 40 mg, Oral, Daily  gabapentin, 100 mg, Oral, Q8H  ketorolac, 15 mg, Intravenous, Q6H  Lidocaine, 1 patch, Transdermal, Q24H  [Held by provider] lisinopril, 40 mg, Oral, Daily  sodium chloride, 10 mL, Intravenous, Q12H         OBJECTIVE    VITAL SIGNS  /75 (BP Location: Right arm, Patient Position: Sitting)   Pulse 117   Temp 97.5 °F (36.4 °C) (Oral)   Resp 18   Ht 180 cm (70.87\")   Wt 110 kg (243 lb 6.2 oz)   SpO2 100%   BMI 34.07 kg/m²     Intake/Output Summary (Last 24 hours) at 3/21/2025 0921  Last data filed at 3/21/2025 0846  Gross per 24 hour   Intake 2017 ml   Output 1150 ml   Net 867 ml       PHYSICAL EXAM    General: Pleasant elderly male, sitting up in bed, in no acute distress.  HEENT:  NCAT, PERRL, EOM intact bilaterally, hearing intact, nares patent  Heart:  Regular rate, normotensive, no JVD bilaterally  Lungs:  Normal respiratory effort, regular respiratory rate, no wheezing  Abdomen: Protuberant and tympanic but softer than yesterday, appropriately tender at his incisions which are clean dry and intact  Extremities:  No cyanosis, clubbing or edema.   Musculoskeletal:  Normal development of bilateral upper extremities. Normal " development of bilateral lower extremities.  Range of motion intact.  No evidence of trauma.  Skin:  No rashes, ecchymosis or jaundice. Dry and non-diaphoretic. Skin color is consistent with ethnicity.    RESULTS    Labs:  I personally reviewed all pertinent labs.   Imaging:  I personally reviewed all pertinent imaging studies.   Pathology:        ASSESSMENT AND PLAN    Active Hospital Problems    Diagnosis     **Dysplastic colon polyp     Tubular adenoma of colon     High grade dysplasia in colonic adenoma          DAILY CARE PLAN    Still awaiting return of bowel function.  He is still having some runs of RVR into the 120s but asymptomatic and blood pressure has remained within normal limits.  I will increase his oral diltiazem dose to 300 mg from 240 mg.  Continue on a full liquid diet for now.  Discontinuing IV fluids.  Labs pending.  Starting twice daily suppositories.  Hopefully discharge home tomorrow.    I discussed the patient's findings and my recommendations with the patient/family, as well as the primary care team.    Kodi Cadena MD, FACS  General Surgery  3/21/2025  09:21 CDT    Please note that portions of this note were completed with a voice recognition program.

## 2025-03-22 ENCOUNTER — APPOINTMENT (OUTPATIENT)
Dept: GENERAL RADIOLOGY | Facility: HOSPITAL | Age: 75
End: 2025-03-22
Payer: MEDICARE

## 2025-03-22 LAB
ANION GAP SERPL CALCULATED.3IONS-SCNC: 11 MMOL/L (ref 5–15)
BASOPHILS # BLD AUTO: 0.01 10*3/MM3 (ref 0–0.2)
BASOPHILS NFR BLD AUTO: 0.1 % (ref 0–1.5)
BUN SERPL-MCNC: 12 MG/DL (ref 8–23)
BUN/CREAT SERPL: 12.5 (ref 7–25)
CALCIUM SPEC-SCNC: 9.2 MG/DL (ref 8.6–10.5)
CHLORIDE SERPL-SCNC: 95 MMOL/L (ref 98–107)
CO2 SERPL-SCNC: 26 MMOL/L (ref 22–29)
CREAT SERPL-MCNC: 0.96 MG/DL (ref 0.76–1.27)
DEPRECATED RDW RBC AUTO: 43.1 FL (ref 37–54)
EGFRCR SERPLBLD CKD-EPI 2021: 81.9 ML/MIN/1.73
EOSINOPHIL # BLD AUTO: 0.06 10*3/MM3 (ref 0–0.4)
EOSINOPHIL NFR BLD AUTO: 0.7 % (ref 0.3–6.2)
ERYTHROCYTE [DISTWIDTH] IN BLOOD BY AUTOMATED COUNT: 12.5 % (ref 12.3–15.4)
GLUCOSE SERPL-MCNC: 131 MG/DL (ref 65–99)
HCT VFR BLD AUTO: 38 % (ref 37.5–51)
HGB BLD-MCNC: 12.5 G/DL (ref 13–17.7)
IMM GRANULOCYTES # BLD AUTO: 0.03 10*3/MM3 (ref 0–0.05)
IMM GRANULOCYTES NFR BLD AUTO: 0.4 % (ref 0–0.5)
LYMPHOCYTES # BLD AUTO: 0.61 10*3/MM3 (ref 0.7–3.1)
LYMPHOCYTES NFR BLD AUTO: 7.3 % (ref 19.6–45.3)
MAGNESIUM SERPL-MCNC: 1.9 MG/DL (ref 1.6–2.4)
MCH RBC QN AUTO: 30.7 PG (ref 26.6–33)
MCHC RBC AUTO-ENTMCNC: 32.9 G/DL (ref 31.5–35.7)
MCV RBC AUTO: 93.4 FL (ref 79–97)
MONOCYTES # BLD AUTO: 0.69 10*3/MM3 (ref 0.1–0.9)
MONOCYTES NFR BLD AUTO: 8.2 % (ref 5–12)
NEUTROPHILS NFR BLD AUTO: 6.99 10*3/MM3 (ref 1.7–7)
NEUTROPHILS NFR BLD AUTO: 83.3 % (ref 42.7–76)
NRBC BLD AUTO-RTO: 0 /100 WBC (ref 0–0.2)
PHOSPHATE SERPL-MCNC: 4.3 MG/DL (ref 2.5–4.5)
PLATELET # BLD AUTO: 305 10*3/MM3 (ref 140–450)
PMV BLD AUTO: 10.1 FL (ref 6–12)
POTASSIUM SERPL-SCNC: 3.9 MMOL/L (ref 3.5–5.2)
PROCALCITONIN SERPL-MCNC: 0.17 NG/ML (ref 0–0.25)
RBC # BLD AUTO: 4.07 10*6/MM3 (ref 4.14–5.8)
SODIUM SERPL-SCNC: 132 MMOL/L (ref 136–145)
WBC NRBC COR # BLD AUTO: 8.39 10*3/MM3 (ref 3.4–10.8)

## 2025-03-22 PROCEDURE — 25010000002 PROCHLORPERAZINE 10 MG/2ML SOLUTION: Performed by: SURGERY

## 2025-03-22 PROCEDURE — 84145 PROCALCITONIN (PCT): CPT | Performed by: SURGERY

## 2025-03-22 PROCEDURE — 85025 COMPLETE CBC W/AUTO DIFF WBC: CPT | Performed by: SURGERY

## 2025-03-22 PROCEDURE — 74018 RADEX ABDOMEN 1 VIEW: CPT

## 2025-03-22 PROCEDURE — 25010000002 KETOROLAC TROMETHAMINE PER 15 MG: Performed by: SURGERY

## 2025-03-22 PROCEDURE — 25010000002 HYDROMORPHONE 1 MG/ML SOLUTION: Performed by: SURGERY

## 2025-03-22 PROCEDURE — 80048 BASIC METABOLIC PNL TOTAL CA: CPT | Performed by: SURGERY

## 2025-03-22 PROCEDURE — 25010000002 ONDANSETRON PER 1 MG: Performed by: SURGERY

## 2025-03-22 PROCEDURE — 99024 POSTOP FOLLOW-UP VISIT: CPT | Performed by: GENERAL PRACTICE

## 2025-03-22 PROCEDURE — 84100 ASSAY OF PHOSPHORUS: CPT | Performed by: SURGERY

## 2025-03-22 PROCEDURE — 83735 ASSAY OF MAGNESIUM: CPT | Performed by: SURGERY

## 2025-03-22 RX ORDER — DEXTROSE MONOHYDRATE AND SODIUM CHLORIDE 5; .45 G/100ML; G/100ML
125 INJECTION, SOLUTION INTRAVENOUS CONTINUOUS
Status: DISPENSED | OUTPATIENT
Start: 2025-03-22 | End: 2025-03-24

## 2025-03-22 RX ORDER — DILTIAZEM HYDROCHLORIDE 5 MG/ML
5 INJECTION INTRAVENOUS 2 TIMES DAILY PRN
Status: DISCONTINUED | OUTPATIENT
Start: 2025-03-22 | End: 2025-03-23

## 2025-03-22 RX ADMIN — CYCLOBENZAPRINE HYDROCHLORIDE 5 MG: 10 TABLET, FILM COATED ORAL at 06:24

## 2025-03-22 RX ADMIN — FAMOTIDINE 40 MG: 20 TABLET, FILM COATED ORAL at 08:31

## 2025-03-22 RX ADMIN — KETOROLAC TROMETHAMINE 15 MG: 30 INJECTION, SOLUTION INTRAMUSCULAR; INTRAVENOUS at 08:31

## 2025-03-22 RX ADMIN — HYDROMORPHONE HYDROCHLORIDE 0.5 MG: 1 INJECTION, SOLUTION INTRAMUSCULAR; INTRAVENOUS; SUBCUTANEOUS at 12:51

## 2025-03-22 RX ADMIN — DILTIAZEM HYDROCHLORIDE 240 MG: 240 CAPSULE, EXTENDED RELEASE ORAL at 08:31

## 2025-03-22 RX ADMIN — GABAPENTIN 100 MG: 100 CAPSULE ORAL at 14:43

## 2025-03-22 RX ADMIN — ACETAMINOPHEN TAB 500 MG 1000 MG: 500 TAB at 17:18

## 2025-03-22 RX ADMIN — ONDANSETRON 4 MG: 2 INJECTION INTRAMUSCULAR; INTRAVENOUS at 19:21

## 2025-03-22 RX ADMIN — LIDOCAINE 1 PATCH: 4 PATCH TOPICAL at 08:31

## 2025-03-22 RX ADMIN — PROCHLORPERAZINE EDISYLATE 5 MG: 5 INJECTION INTRAMUSCULAR; INTRAVENOUS at 07:23

## 2025-03-22 RX ADMIN — Medication 10 ML: at 08:32

## 2025-03-22 RX ADMIN — KETOROLAC TROMETHAMINE 15 MG: 30 INJECTION, SOLUTION INTRAMUSCULAR; INTRAVENOUS at 02:49

## 2025-03-22 RX ADMIN — DILTIAZEM HYDROCHLORIDE 5 MG: 5 INJECTION, SOLUTION INTRAVENOUS at 22:15

## 2025-03-22 RX ADMIN — LISINOPRIL 40 MG: 20 TABLET ORAL at 08:31

## 2025-03-22 RX ADMIN — DEXTROSE AND SODIUM CHLORIDE 125 ML/HR: 5; 450 INJECTION, SOLUTION INTRAVENOUS at 14:43

## 2025-03-22 RX ADMIN — DEXTROSE AND SODIUM CHLORIDE 125 ML/HR: 5; 450 INJECTION, SOLUTION INTRAVENOUS at 22:15

## 2025-03-22 RX ADMIN — APIXABAN 5 MG: 5 TABLET, FILM COATED ORAL at 08:31

## 2025-03-22 RX ADMIN — PROCHLORPERAZINE EDISYLATE 5 MG: 5 INJECTION INTRAMUSCULAR; INTRAVENOUS at 17:18

## 2025-03-22 RX ADMIN — ONDANSETRON 4 MG: 2 INJECTION INTRAMUSCULAR; INTRAVENOUS at 12:51

## 2025-03-22 RX ADMIN — CYCLOBENZAPRINE HYDROCHLORIDE 5 MG: 10 TABLET, FILM COATED ORAL at 14:43

## 2025-03-22 RX ADMIN — GABAPENTIN 100 MG: 100 CAPSULE ORAL at 06:24

## 2025-03-22 NOTE — PLAN OF CARE
Goal Outcome Evaluation:  Plan of Care Reviewed With: patient        Progress: no change  Outcome Evaluation: Pt's abdomen distended and taut, KUB obtained. Now NPO and IV fluids running. Continues to be nauseous and vomiting throughout the shift, PRNs given. Pain controlled with PRNs. Alert x4, on room air. Safety maintained.

## 2025-03-22 NOTE — PROGRESS NOTES
LOS: 3 days   Patient Care Team:  Holly Montoya MD as PCP - General (Internal Medicine)  Kodi Cadena MD as Surgeon (General Surgery)    Subjective  No acute events overnight.  Patient out of bed to chair this morning.  He is on regular diet however has had increasing nausea and distention this morning.  Had bowel movement yesterday.  Continues to pass gas.  Heart rate has been in the low 100s throughout yesterday and overnight.    Objective:   Vital Signs  Temp:  [97.4 °F (36.3 °C)-98.6 °F (37 °C)] 98.3 °F (36.8 °C)  Heart Rate:  [] 106  Resp:  [18] 18  BP: (102-137)/(70-83) 118/73    Intake & Output (last 3 days)         03/19 0701  03/20 0700 03/20 0701  03/21 0700 03/21 0701  03/22 0700 03/22 0701  03/23 0700    P.O.   240 480    I.V. (mL/kg) 2300 (20.9)  2017 (18.3)     IV Piggyback 350       Total Intake(mL/kg) 2650 (24.1)  2257 (20.5) 480 (4.4)    Urine (mL/kg/hr) 375 (0.1) 1150 (0.4) 350 (0.1)     Stool   0     Total Output 375 1150 350     Net +2275 -1150 +1907 +480            Urine Unmeasured Occurrence 1 x  7 x     Stool Unmeasured Occurrence   4 x             Physical Exam:     General Appearance:    Alert, cooperative, in no acute distress   HEENT:   Normocephalic, atraumatic, EOMI, MMM   Lungs:     Equal chest rise bilaterally.  No increased work of breathing    Heart:    Regular rhythm and normal rate   Abdomen:  Incisions clean dry and intact, soft, distended, no tenderness to palpation.   Extremities:     Moves all extremities spontaneously, no peripheral edema   Results Review:     I reviewed the patient's new clinical results. Significant labs:   I reviewed the patient's new imaging results and agree with the interpretation.    Results from last 7 days   Lab Units 03/22/25  0409 03/21/25  0955   WBC 10*3/mm3 8.39 7.34   HEMOGLOBIN g/dL 12.5* 10.6*   HEMATOCRIT % 38.0 31.3*   PLATELETS 10*3/mm3 305 202        Results from last 7 days   Lab Units 03/22/25  0409 03/21/25  0955   SODIUM  mmol/L 132* 134*   POTASSIUM mmol/L 3.9 4.1   CHLORIDE mmol/L 95* 102   CO2 mmol/L 26.0 24.0   BUN mg/dL 12 8   CREATININE mg/dL 0.96 0.71*   CALCIUM mg/dL 9.2 8.6   GLUCOSE mg/dL 131* 138*       Assessment and plan:    Assessment & Plan       Dysplastic colon polyp    Tubular adenoma of colon    High grade dysplasia in colonic adenoma    Atrial fibrillation with rapid ventricular response      75-year-old male with history of A-fib on Eliquis who underwent robot-assisted right hemicolectomy on 3/19.  Patient recovering appropriately.      Some increase in abdominal distention and nausea today.  Will continue to observe.  KUB this morning.  If significant small bowel/gastric distention will make n.p.o. He states he is having bowel movements and passing gas.  I expect possible discharge tomorrow if distention improves.      Jeff Linder MD  03/22/25  11:30 CDT    Part of this note may be an electronic transcription/translation of spoken language to printed text using the Dragon Dictation System.

## 2025-03-22 NOTE — PLAN OF CARE
Goal Outcome Evaluation:  Plan of Care Reviewed With: patient  Progress: no change       Pt with some c/o pain this shift, prn med given x1, otherwise scheduled meds per order. IV saline locked. C/o nausea x1 earlier this shift, Zofran given. Up with asst x1. Voiding per BRP. Lg BM this shift per pt. Incisions to abd intact. VSS. Safety maintained.

## 2025-03-22 NOTE — PLAN OF CARE
Goal Outcome Evaluation:  Plan of Care Reviewed With: other (see comments) ([employer])           Outcome Evaluation: Family request for pt to have Boost with breakfast and dinner in vanilla. Follow per protocol.

## 2025-03-23 ENCOUNTER — APPOINTMENT (OUTPATIENT)
Dept: CT IMAGING | Facility: HOSPITAL | Age: 75
End: 2025-03-23
Payer: MEDICARE

## 2025-03-23 LAB
ANION GAP SERPL CALCULATED.3IONS-SCNC: 12 MMOL/L (ref 5–15)
ANION GAP SERPL CALCULATED.3IONS-SCNC: 13 MMOL/L (ref 5–15)
BUN SERPL-MCNC: 31 MG/DL (ref 8–23)
BUN SERPL-MCNC: 32 MG/DL (ref 8–23)
BUN/CREAT SERPL: 10.5 (ref 7–25)
BUN/CREAT SERPL: 9.1 (ref 7–25)
CALCIUM SPEC-SCNC: 9 MG/DL (ref 8.6–10.5)
CALCIUM SPEC-SCNC: 9.2 MG/DL (ref 8.6–10.5)
CHLORIDE SERPL-SCNC: 91 MMOL/L (ref 98–107)
CHLORIDE SERPL-SCNC: 92 MMOL/L (ref 98–107)
CO2 SERPL-SCNC: 32 MMOL/L (ref 22–29)
CO2 SERPL-SCNC: 33 MMOL/L (ref 22–29)
CREAT SERPL-MCNC: 3.05 MG/DL (ref 0.76–1.27)
CREAT SERPL-MCNC: 3.4 MG/DL (ref 0.76–1.27)
DEPRECATED RDW RBC AUTO: 43.3 FL (ref 37–54)
EGFRCR SERPLBLD CKD-EPI 2021: 18 ML/MIN/1.73
EGFRCR SERPLBLD CKD-EPI 2021: 20.5 ML/MIN/1.73
ERYTHROCYTE [DISTWIDTH] IN BLOOD BY AUTOMATED COUNT: 12.6 % (ref 12.3–15.4)
GLUCOSE SERPL-MCNC: 115 MG/DL (ref 65–99)
GLUCOSE SERPL-MCNC: 122 MG/DL (ref 65–99)
HCT VFR BLD AUTO: 35.1 % (ref 37.5–51)
HGB BLD-MCNC: 11.3 G/DL (ref 13–17.7)
MCH RBC QN AUTO: 30.1 PG (ref 26.6–33)
MCHC RBC AUTO-ENTMCNC: 32.2 G/DL (ref 31.5–35.7)
MCV RBC AUTO: 93.4 FL (ref 79–97)
PLATELET # BLD AUTO: 413 10*3/MM3 (ref 140–450)
PMV BLD AUTO: 10.6 FL (ref 6–12)
POTASSIUM SERPL-SCNC: 3.5 MMOL/L (ref 3.5–5.2)
POTASSIUM SERPL-SCNC: 3.6 MMOL/L (ref 3.5–5.2)
POTASSIUM SERPL-SCNC: 3.7 MMOL/L (ref 3.5–5.2)
PROCALCITONIN SERPL-MCNC: 0.23 NG/ML (ref 0–0.25)
RBC # BLD AUTO: 3.76 10*6/MM3 (ref 4.14–5.8)
SODIUM SERPL-SCNC: 136 MMOL/L (ref 136–145)
SODIUM SERPL-SCNC: 137 MMOL/L (ref 136–145)
WBC NRBC COR # BLD AUTO: 8.5 10*3/MM3 (ref 3.4–10.8)

## 2025-03-23 PROCEDURE — 25010000002 POTASSIUM CHLORIDE 10 MEQ/100ML SOLUTION: Performed by: SURGERY

## 2025-03-23 PROCEDURE — 85027 COMPLETE CBC AUTOMATED: CPT | Performed by: GENERAL PRACTICE

## 2025-03-23 PROCEDURE — 84132 ASSAY OF SERUM POTASSIUM: CPT | Performed by: SURGERY

## 2025-03-23 PROCEDURE — 25810000003 SODIUM CHLORIDE 0.9 % SOLUTION: Performed by: GENERAL PRACTICE

## 2025-03-23 PROCEDURE — 84145 PROCALCITONIN (PCT): CPT | Performed by: SURGERY

## 2025-03-23 PROCEDURE — 25010000002 ONDANSETRON PER 1 MG: Performed by: SURGERY

## 2025-03-23 PROCEDURE — 25010000002 ENOXAPARIN PER 10 MG: Performed by: SURGERY

## 2025-03-23 PROCEDURE — 74176 CT ABD & PELVIS W/O CONTRAST: CPT

## 2025-03-23 PROCEDURE — 80048 BASIC METABOLIC PNL TOTAL CA: CPT | Performed by: GENERAL PRACTICE

## 2025-03-23 PROCEDURE — 99024 POSTOP FOLLOW-UP VISIT: CPT | Performed by: GENERAL PRACTICE

## 2025-03-23 PROCEDURE — 25510000002 DIATRIZOATE MEGLUMINE & SODIUM PER 1 ML: Performed by: SURGERY

## 2025-03-23 RX ORDER — DILTIAZEM HYDROCHLORIDE 5 MG/ML
5 INJECTION INTRAVENOUS 4 TIMES DAILY
Status: DISCONTINUED | OUTPATIENT
Start: 2025-03-23 | End: 2025-03-23

## 2025-03-23 RX ORDER — POTASSIUM CHLORIDE 7.45 MG/ML
10 INJECTION INTRAVENOUS
Status: COMPLETED | OUTPATIENT
Start: 2025-03-23 | End: 2025-03-23

## 2025-03-23 RX ORDER — DIATRIZOATE MEGLUMINE AND DIATRIZOATE SODIUM 660; 100 MG/ML; MG/ML
45 SOLUTION ORAL; RECTAL ONCE
Status: COMPLETED | OUTPATIENT
Start: 2025-03-23 | End: 2025-03-23

## 2025-03-23 RX ORDER — ENOXAPARIN SODIUM 150 MG/ML
1 INJECTION SUBCUTANEOUS
Status: DISCONTINUED | OUTPATIENT
Start: 2025-03-23 | End: 2025-03-26 | Stop reason: HOSPADM

## 2025-03-23 RX ORDER — DEXTROSE MONOHYDRATE, SODIUM CHLORIDE, AND POTASSIUM CHLORIDE 50; .745; 4.5 G/1000ML; G/1000ML; G/1000ML
75 INJECTION, SOLUTION INTRAVENOUS CONTINUOUS
Status: DISCONTINUED | OUTPATIENT
Start: 2025-03-23 | End: 2025-03-25

## 2025-03-23 RX ORDER — DILTIAZEM HYDROCHLORIDE 5 MG/ML
10 INJECTION INTRAVENOUS 4 TIMES DAILY
Status: DISCONTINUED | OUTPATIENT
Start: 2025-03-23 | End: 2025-03-26

## 2025-03-23 RX ORDER — FAMOTIDINE 20 MG/1
20 TABLET, FILM COATED ORAL DAILY
Status: DISCONTINUED | OUTPATIENT
Start: 2025-03-24 | End: 2025-03-24

## 2025-03-23 RX ADMIN — DEXTROSE AND SODIUM CHLORIDE 125 ML/HR: 5; 450 INJECTION, SOLUTION INTRAVENOUS at 06:40

## 2025-03-23 RX ADMIN — SODIUM CHLORIDE 500 ML: 9 INJECTION, SOLUTION INTRAVENOUS at 11:16

## 2025-03-23 RX ADMIN — ENOXAPARIN SODIUM 105 MG: 120 INJECTION SUBCUTANEOUS at 16:54

## 2025-03-23 RX ADMIN — POTASSIUM CHLORIDE 10 MEQ: 7.46 INJECTION, SOLUTION INTRAVENOUS at 14:24

## 2025-03-23 RX ADMIN — DILTIAZEM HYDROCHLORIDE 5 MG: 5 INJECTION, SOLUTION INTRAVENOUS at 13:07

## 2025-03-23 RX ADMIN — POTASSIUM CHLORIDE 10 MEQ: 7.46 INJECTION, SOLUTION INTRAVENOUS at 21:00

## 2025-03-23 RX ADMIN — DIATRIZOATE MEGLUMINE AND DIATRIZOATE SODIUM 45 ML: 660; 100 LIQUID ORAL; RECTAL at 16:54

## 2025-03-23 RX ADMIN — SODIUM CHLORIDE 1000 ML: 9 INJECTION, SOLUTION INTRAVENOUS at 09:39

## 2025-03-23 RX ADMIN — POTASSIUM CHLORIDE 10 MEQ: 7.46 INJECTION, SOLUTION INTRAVENOUS at 12:50

## 2025-03-23 RX ADMIN — DILTIAZEM HYDROCHLORIDE 10 MG: 5 INJECTION, SOLUTION INTRAVENOUS at 21:00

## 2025-03-23 RX ADMIN — LIDOCAINE 1 PATCH: 4 PATCH TOPICAL at 13:07

## 2025-03-23 RX ADMIN — DEXTROSE MONOHYDRATE, SODIUM CHLORIDE, AND POTASSIUM CHLORIDE 175 ML/HR: 50; 4.5; .745 INJECTION, SOLUTION INTRAVENOUS at 21:01

## 2025-03-23 RX ADMIN — DEXTROSE MONOHYDRATE, SODIUM CHLORIDE, AND POTASSIUM CHLORIDE 175 ML/HR: 50; 4.5; .745 INJECTION, SOLUTION INTRAVENOUS at 12:50

## 2025-03-23 RX ADMIN — SODIUM CHLORIDE 500 ML: 9 INJECTION, SOLUTION INTRAVENOUS at 12:13

## 2025-03-23 RX ADMIN — POTASSIUM CHLORIDE 10 MEQ: 7.46 INJECTION, SOLUTION INTRAVENOUS at 22:36

## 2025-03-23 RX ADMIN — DILTIAZEM HYDROCHLORIDE 5 MG: 5 INJECTION, SOLUTION INTRAVENOUS at 18:34

## 2025-03-23 RX ADMIN — ONDANSETRON 4 MG: 2 INJECTION INTRAMUSCULAR; INTRAVENOUS at 18:34

## 2025-03-23 NOTE — PLAN OF CARE
Goal Outcome Evaluation:              Outcome Evaluation: A&O. Room air. IVF continuous. Hypotensive, 2L of NS bolus given per order. 3600 mL out of NG this shift. No c/o pain or nausa. Tele, tachycardic, AF. Eliquis and lisinopril on hold per MD, lovenox ordered. Potassim replaced per electrolyte replacement protocol. ML and lap x4 are c/d/i with steri strips. NPO. Family at bedside. Safety maintained.

## 2025-03-23 NOTE — PROGRESS NOTES
"Pharmacy Dosing Service  Anticoagulant   enoxaparin [Lovenox]      Assessment/Action/Plan:  Received a dose for enoxaparin [Lovenox] 105 mg (1 mg/kg) SC Q12H for the indication of atrial fibrillation. Reviewed patient's age, CrCl, BMI, and other labs. Adjusted the dose to enoxaparin [Lovenox] 105 (1 mg/kg) SC Q24H. No other action required at this time per pharmacy based on available information and current clinical status. Will continue routine follow-up evaluation and make any necessary adjustments.     Subjective:  Kodi Stark is a 75 y.o. male on  enoxaparin [Lovenox] 105 mg (1mg/kg) SC Q24H  for indication of  atrial fibrillation - requiring full anticoagulation .    Objective:  [Ht: 180 cm (70.87\"); Wt: 110 kg (243 lb 6.2 oz); BMI: Body mass index is 34.07 kg/m².]  Estimated Creatinine Clearance: 23.3 mL/min (A) (by C-G formula based on SCr of 3.4 mg/dL (H)).   Lab Results   Component Value Date    INR 1.02 02/11/2025    INR 1.13 08/16/2019    PROTIME 14.0 02/11/2025    PROTIME 13.9 08/16/2019      Lab Results   Component Value Date    HGB 11.3 (L) 03/23/2025    HGB 12.5 (L) 03/22/2025    HGB 10.6 (L) 03/21/2025      Lab Results   Component Value Date     03/23/2025     03/22/2025     03/21/2025       Jaz Browne, PharmD  03/23/25 12:36 CDT     "

## 2025-03-23 NOTE — PROGRESS NOTES
"Pharmacy Renal Dose Adjustment     Assessment/Action/Plan:  Estimated Creatinine Clearance: 25.9 mL/min (A) (by C-G formula based on SCr of 3.05 mg/dL (H)).  Based on prescribing information provided by the drug , famotidine 40 mg IV every 24 hours, has been changed to 20 mg IV every 24 hours. Pharmacy will continue to monitor daily and make further adjustment(s) accordingly.     Subjective:  Kodi Stark is a 75 y.o. male     Additional Factors Considered:  Patient disposition per documentation  Disease state or condition being treated    Objective:  Ht: 180 cm (70.87\"); Wt: 110 kg (243 lb 6.2 oz)  Estimated Creatinine Clearance: 25.9 mL/min (A) (by C-G formula based on SCr of 3.05 mg/dL (H)).   Creatinine   Date Value Ref Range Status   03/23/2025 3.05 (H) 0.76 - 1.27 mg/dL Final   03/23/2025 3.40 (H) 0.76 - 1.27 mg/dL Final   03/22/2025 0.96 0.76 - 1.27 mg/dL Final   02/11/2025 0.90 0.60 - 1.30 mg/dL Final     Comment:     Serial Number: [id number]:  [id number]   02/11/2025 1.40 (H) 0.60 - 1.30 mg/dL Final     Comment:     Serial Number: [id number]:  [id number]   06/01/2023 0.80 0.60 - 1.30 mg/dL Final     Comment:     Serial Number: [id number]:  [id number]   08/16/2019 1.7 (H) 0.5 - 1.2 mg/dL Final       Matthew Wright, PharmCRYS  03/23/25 15:33 CDT    "

## 2025-03-23 NOTE — PROGRESS NOTES
LOS: 4 days   Patient Care Team:  Holly Montoya MD as PCP - General (Internal Medicine)  Kodi Cadena MD as Surgeon (General Surgery)    Subjective  Patient with nausea and vomiting yesterday.  KUB showing dilated loops of small bowel.  NG tube placed with 5 L out overnight.  X 1 episode of hypotension this morning.  Receiving fluid bolus.    Labs returned this morning with an LUCERO. Cr. 3.4 from 0.96 yesterday.    Objective:   Vital Signs  Temp:  [97.5 °F (36.4 °C)-98.3 °F (36.8 °C)] 97.9 °F (36.6 °C)  Heart Rate:  [100-140] 123  Resp:  [18] 18  BP: ()/(54-74) 76/58    Intake & Output (last 3 days)         03/20 0701  03/21 0700 03/21 0701 03/22 0700 03/22 0701  03/23 0700 03/23 0701  03/24 0700    P.O.  240 0     I.V. (mL/kg)  2017 (18.3)      Other    120    IV Piggyback        Total Intake(mL/kg)  2257 (20.5) 0 (0) 120 (1.1)    Urine (mL/kg/hr) 1150 (0.4) 350 (0.1)      Emesis/NG output   5250 2150    Stool  0      Total Output 9161 390 6804 2150    Net -1150 +1907 -5250 -2030            Urine Unmeasured Occurrence  7 x      Stool Unmeasured Occurrence  4 x              Physical Exam:     General Appearance:    Alert, cooperative, in no acute distress   HEENT:   Normocephalic, atraumatic, EOMI, MMM, NG tube in place   Lungs:     Equal chest rise bilaterally.  No increased work of breathing    Heart:    Regular rhythm and normal rate   Abdomen:  Abdomen soft, appropriately tender, distended and tympanic, incisions clean dry and intact.   Extremities:     Moves all extremities spontaneously, no peripheral edema   Results Review:     I reviewed the patient's new clinical results. Significant labs:   I reviewed the patient's new imaging results and agree with the interpretation.    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  Patient's recovery complicated by postoperative ileus.    NG tube in place, significant output over the past 24 hours  Maintenance IV fluids, pain management   Transition to as needed Cardizem for A-fib and tachycardia while NPO   Hypotension this morning, will give resuscitation via LR bolus  Patient otherwise doing well, working with physical therapy, pain controlled    **LUCERO this morning (Cr. 3.4 from 0.9). Received 2L bolus. mIVF inc to 175ml/hr. Will have repeat Cr this afternoon at 1700.     Jeff Linder MD  03/23/25  10:14 CDT    Part of this note may be an electronic transcription/translation of spoken language to printed text using the Dragon Dictation System.

## 2025-03-23 NOTE — PLAN OF CARE
Goal Outcome Evaluation:  Plan of Care Reviewed With: patient  Progress: improving       Pt with min c/o pain this shift. NG placed per order, 3.5L immediately returned, 1.75L removed since. IVF infusing per order. Prn IV cardizem given x1 earlier this shift for HR maintained 130's-140 afib; improved to 100-120 since. Incisions intact to abd. VSS. Safety maintained.

## 2025-03-24 ENCOUNTER — APPOINTMENT (OUTPATIENT)
Dept: GENERAL RADIOLOGY | Facility: HOSPITAL | Age: 75
End: 2025-03-24
Payer: MEDICARE

## 2025-03-24 LAB
ALBUMIN SERPL-MCNC: 3.3 G/DL (ref 3.5–5.2)
ALBUMIN/GLOB SERPL: 1 G/DL
ALP SERPL-CCNC: 33 U/L (ref 39–117)
ALT SERPL W P-5'-P-CCNC: 11 U/L (ref 1–41)
ANION GAP SERPL CALCULATED.3IONS-SCNC: 13 MMOL/L (ref 5–15)
AST SERPL-CCNC: 20 U/L (ref 1–40)
BASOPHILS # BLD AUTO: 0.01 10*3/MM3 (ref 0–0.2)
BASOPHILS NFR BLD AUTO: 0.2 % (ref 0–1.5)
BILIRUB SERPL-MCNC: 0.5 MG/DL (ref 0–1.2)
BUN SERPL-MCNC: 33 MG/DL (ref 8–23)
BUN/CREAT SERPL: 15.6 (ref 7–25)
CALCIUM SPEC-SCNC: 9.1 MG/DL (ref 8.6–10.5)
CHLORIDE SERPL-SCNC: 90 MMOL/L (ref 98–107)
CO2 SERPL-SCNC: 34 MMOL/L (ref 22–29)
CREAT SERPL-MCNC: 2.12 MG/DL (ref 0.76–1.27)
CYTO UR: NORMAL
DEPRECATED RDW RBC AUTO: 43.1 FL (ref 37–54)
EGFRCR SERPLBLD CKD-EPI 2021: 31.7 ML/MIN/1.73
EOSINOPHIL # BLD AUTO: 0.09 10*3/MM3 (ref 0–0.4)
EOSINOPHIL NFR BLD AUTO: 1.8 % (ref 0.3–6.2)
ERYTHROCYTE [DISTWIDTH] IN BLOOD BY AUTOMATED COUNT: 12.5 % (ref 12.3–15.4)
GLOBULIN UR ELPH-MCNC: 3.2 GM/DL
GLUCOSE SERPL-MCNC: 129 MG/DL (ref 65–99)
HCT VFR BLD AUTO: 32.4 % (ref 37.5–51)
HGB BLD-MCNC: 10.4 G/DL (ref 13–17.7)
IMM GRANULOCYTES # BLD AUTO: 0.02 10*3/MM3 (ref 0–0.05)
IMM GRANULOCYTES NFR BLD AUTO: 0.4 % (ref 0–0.5)
LAB AP CASE REPORT: NORMAL
LAB AP CLINICAL INFORMATION: NORMAL
LYMPHOCYTES # BLD AUTO: 0.7 10*3/MM3 (ref 0.7–3.1)
LYMPHOCYTES NFR BLD AUTO: 14 % (ref 19.6–45.3)
Lab: NORMAL
MAGNESIUM SERPL-MCNC: 1.9 MG/DL (ref 1.6–2.4)
MCH RBC QN AUTO: 30.2 PG (ref 26.6–33)
MCHC RBC AUTO-ENTMCNC: 32.1 G/DL (ref 31.5–35.7)
MCV RBC AUTO: 94.2 FL (ref 79–97)
MONOCYTES # BLD AUTO: 0.72 10*3/MM3 (ref 0.1–0.9)
MONOCYTES NFR BLD AUTO: 14.4 % (ref 5–12)
NEUTROPHILS NFR BLD AUTO: 3.47 10*3/MM3 (ref 1.7–7)
NEUTROPHILS NFR BLD AUTO: 69.2 % (ref 42.7–76)
NRBC BLD AUTO-RTO: 0 /100 WBC (ref 0–0.2)
PATH REPORT.FINAL DX SPEC: NORMAL
PATH REPORT.GROSS SPEC: NORMAL
PHOSPHATE SERPL-MCNC: 2.9 MG/DL (ref 2.5–4.5)
PLATELET # BLD AUTO: 352 10*3/MM3 (ref 140–450)
PMV BLD AUTO: 10.7 FL (ref 6–12)
POTASSIUM SERPL-SCNC: 3.5 MMOL/L (ref 3.5–5.2)
POTASSIUM SERPL-SCNC: 3.6 MMOL/L (ref 3.5–5.2)
PROCALCITONIN SERPL-MCNC: 0.18 NG/ML (ref 0–0.25)
PROT SERPL-MCNC: 6.5 G/DL (ref 6–8.5)
RBC # BLD AUTO: 3.44 10*6/MM3 (ref 4.14–5.8)
SODIUM SERPL-SCNC: 137 MMOL/L (ref 136–145)
WBC NRBC COR # BLD AUTO: 5.01 10*3/MM3 (ref 3.4–10.8)

## 2025-03-24 PROCEDURE — 25010000002 POTASSIUM CHLORIDE 10 MEQ/100ML SOLUTION: Performed by: SURGERY

## 2025-03-24 PROCEDURE — 25010000002 AMIODARONE IN DEXTROSE 5% 150-4.21 MG/100ML-% SOLUTION: Performed by: SURGERY

## 2025-03-24 PROCEDURE — 25010000002 ENOXAPARIN PER 10 MG: Performed by: SURGERY

## 2025-03-24 PROCEDURE — 74018 RADEX ABDOMEN 1 VIEW: CPT

## 2025-03-24 PROCEDURE — 83735 ASSAY OF MAGNESIUM: CPT | Performed by: SURGERY

## 2025-03-24 PROCEDURE — 84145 PROCALCITONIN (PCT): CPT | Performed by: SURGERY

## 2025-03-24 PROCEDURE — 99024 POSTOP FOLLOW-UP VISIT: CPT | Performed by: NURSE PRACTITIONER

## 2025-03-24 PROCEDURE — 84100 ASSAY OF PHOSPHORUS: CPT | Performed by: SURGERY

## 2025-03-24 PROCEDURE — 25010000002 AMIODARONE IN DEXTROSE 5% 360-4.14 MG/200ML-% SOLUTION: Performed by: SURGERY

## 2025-03-24 PROCEDURE — 80053 COMPREHEN METABOLIC PANEL: CPT | Performed by: SURGERY

## 2025-03-24 PROCEDURE — 85025 COMPLETE CBC W/AUTO DIFF WBC: CPT | Performed by: SURGERY

## 2025-03-24 PROCEDURE — 84132 ASSAY OF SERUM POTASSIUM: CPT | Performed by: SURGERY

## 2025-03-24 RX ORDER — POTASSIUM CHLORIDE 7.45 MG/ML
10 INJECTION INTRAVENOUS
Status: DISPENSED | OUTPATIENT
Start: 2025-03-24 | End: 2025-03-24

## 2025-03-24 RX ORDER — POTASSIUM CHLORIDE 7.45 MG/ML
10 INJECTION INTRAVENOUS
Status: COMPLETED | OUTPATIENT
Start: 2025-03-24 | End: 2025-03-25

## 2025-03-24 RX ORDER — POTASSIUM CHLORIDE 7.45 MG/ML
10 INJECTION INTRAVENOUS
Status: COMPLETED | OUTPATIENT
Start: 2025-03-24 | End: 2025-03-24

## 2025-03-24 RX ORDER — PANTOPRAZOLE SODIUM 40 MG/10ML
40 INJECTION, POWDER, LYOPHILIZED, FOR SOLUTION INTRAVENOUS
Status: DISCONTINUED | OUTPATIENT
Start: 2025-03-25 | End: 2025-03-26 | Stop reason: HOSPADM

## 2025-03-24 RX ADMIN — ENOXAPARIN SODIUM 105 MG: 120 INJECTION SUBCUTANEOUS at 15:38

## 2025-03-24 RX ADMIN — Medication 10 ML: at 10:13

## 2025-03-24 RX ADMIN — POTASSIUM CHLORIDE 10 MEQ: 7.46 INJECTION, SOLUTION INTRAVENOUS at 11:40

## 2025-03-24 RX ADMIN — DILTIAZEM HYDROCHLORIDE 10 MG: 5 INJECTION, SOLUTION INTRAVENOUS at 10:09

## 2025-03-24 RX ADMIN — POTASSIUM CHLORIDE 10 MEQ: 10 INJECTION, SOLUTION INTRAVENOUS at 21:43

## 2025-03-24 RX ADMIN — POTASSIUM CHLORIDE 10 MEQ: 7.46 INJECTION, SOLUTION INTRAVENOUS at 10:09

## 2025-03-24 RX ADMIN — DEXTROSE MONOHYDRATE, SODIUM CHLORIDE, AND POTASSIUM CHLORIDE 175 ML/HR: 50; 4.5; .745 INJECTION, SOLUTION INTRAVENOUS at 03:50

## 2025-03-24 RX ADMIN — AMIODARONE HYDROCHLORIDE 1 MG/MIN: 1.8 INJECTION, SOLUTION INTRAVENOUS at 06:17

## 2025-03-24 RX ADMIN — AMIODARONE HYDROCHLORIDE 150 MG: 1.5 INJECTION, SOLUTION INTRAVENOUS at 05:56

## 2025-03-24 RX ADMIN — AMIODARONE HYDROCHLORIDE 0.5 MG/MIN: 1.8 INJECTION, SOLUTION INTRAVENOUS at 14:17

## 2025-03-24 RX ADMIN — DILTIAZEM HYDROCHLORIDE 10 MG: 5 INJECTION, SOLUTION INTRAVENOUS at 20:06

## 2025-03-24 RX ADMIN — DILTIAZEM HYDROCHLORIDE 10 MG: 5 INJECTION, SOLUTION INTRAVENOUS at 17:56

## 2025-03-24 RX ADMIN — POTASSIUM CHLORIDE 10 MEQ: 10 INJECTION, SOLUTION INTRAVENOUS at 23:24

## 2025-03-24 RX ADMIN — LIDOCAINE 1 PATCH: 4 PATCH TOPICAL at 10:21

## 2025-03-24 RX ADMIN — POTASSIUM CHLORIDE 10 MEQ: 7.46 INJECTION, SOLUTION INTRAVENOUS at 14:45

## 2025-03-24 RX ADMIN — DEXTROSE MONOHYDRATE, SODIUM CHLORIDE, AND POTASSIUM CHLORIDE 75 ML/HR: 50; 4.5; .745 INJECTION, SOLUTION INTRAVENOUS at 13:46

## 2025-03-24 RX ADMIN — POTASSIUM CHLORIDE 10 MEQ: 7.46 INJECTION, SOLUTION INTRAVENOUS at 15:39

## 2025-03-24 RX ADMIN — DILTIAZEM HYDROCHLORIDE 10 MG: 5 INJECTION, SOLUTION INTRAVENOUS at 11:42

## 2025-03-24 NOTE — PLAN OF CARE
Goal Outcome Evaluation:  Plan of Care Reviewed With: patient  Progress: improving       Pt with min c/o pain this shift; no request for prn pain meds thus far. IVF infusing per order. K+ runs x2 given for K+ 3.5; recheck this am. NG maintained in place to LIWS with 1L out thus far this shift. Up with asst. HR maintained afib 120's, up as high as 160's intermittently. VSS. Safety maintained.

## 2025-03-24 NOTE — PROGRESS NOTES
[facility] GENERAL SURGERY    PROGRESS NOTE    Today's Date: 03/24/25    Patient Name: Kodi Stark  MRN: [id number]  CSN: [id number]  PCP: Holly Montoya MD  Attending Provider: Kodi Cadena MD  Length of Stay: 5    SUBJECTIVE     Kodi Stark was admitted on 3/19 for planned laparoscopic robotic assisted right colectomy with anastomosis for treatment of ascending colon adenomatous polyp with high-grade dysplasia.  Patient's recovery was complicated by postoperative ileus.  NG tube was placed on the night of 3/22 and has had significant output.  Patient did develop LUCERO with creatinine increased from 3.4 from 0.96 which is now trended down to 2.12 this morning.  Amiodarone loading dose and infusion was ordered this morning due to A-fib with RVR.  Patient reports passing bowel movement this morning.  He states it was firm.  He denies nausea.  Patient has had 5800mL out of NG tube over the past 24 hours.      Visit Dx:    ICD-10-CM ICD-9-CM   1. Tubular adenoma of colon  D12.6 211.3   2. High grade dysplasia in colonic adenoma  D12.6 211.3       Hospital Problem List:     Dysplastic colon polyp    Tubular adenoma of colon    High grade dysplasia in colonic adenoma    Atrial fibrillation with rapid ventricular response      History:   Past Medical History:   Diagnosis Date    Abnormal ECG     Adenomatous colon polyp 2023    POSITIVE FOR CANCER AND REMOVED VIA COLONOSCOPY IN 2023    Arrhythmia 2/11/25    Pre-op    Asthma     Upon exertion    Cancer 5/23    1 colon polyp    Colon polyp     High cholesterol     Hypertension     New onset a-fib 02/11/2025    WITH RVR    Tubular adenoma of colon 02/04/2025    of the ascending colon with high grade dysplasia     Past Surgical History:   Procedure Laterality Date    COLON RESECTION Right 3/19/2025    Procedure: LAPAROSCOPIC RIGHT COLECTOMY WITH DAVINCI ROBOT (MINIMALLY INVASIVE COLON RESECTION), CONVERSION TO OPEN;  Surgeon: Kodi Cadena MD;  Location:   [facility] OR;  Service: Robotics - DaVinci;  Laterality: Right;    COLONOSCOPY       Social History     Socioeconomic History    Marital status:    Tobacco Use    Smoking status: Former     Current packs/day: 0.00     Average packs/day: 1.5 packs/day for 40.0 years (60.0 ttl pk-yrs)     Types: Cigarettes     Start date:      Quit date:      Years since quittin.2     Passive exposure: Never    Smokeless tobacco: Never   Vaping Use    Vaping status: Never Used   Substance and Sexual Activity    Alcohol use: Yes     Alcohol/week: 24.0 standard drinks of alcohol     Types: 24 Cans of beer per week     Comment: DAILY BEER COUPLE A DAY    Drug use: Not Currently    Sexual activity: Defer       Allergies:  No Known Allergies    Medications:    Current Facility-Administered Medications:     acetaminophen (TYLENOL) tablet 1,000 mg, 1,000 mg, Oral, Q8H, Kodi Cadena MD, 1,000 mg at 25 1718    [COMPLETED] amiodarone 150 mg in 100 mL D5W (loading dose), 150 mg, Intravenous, Once, 150 mg at 25 0556 **FOLLOWED BY** amiodarone 360 mg in 200 mL D5W infusion, 1 mg/min, Intravenous, Continuous, Last Rate: 33.3 mL/hr at 25 0617, 1 mg/min at 25 0617 **FOLLOWED BY** amiodarone 360 mg in 200 mL D5W infusion, 0.5 mg/min, Intravenous, Continuous, Kodi Cadena MD    bisacodyl (DULCOLAX) suppository 10 mg, 10 mg, Rectal, BID, Kodi Cadena MD, 10 mg at 25 0953    Calcium Replacement - Follow Nurse / BPA Driven Protocol, , Not Applicable, PRN, Kodi Cadena MD    cyclobenzaprine (FLEXERIL) tablet 5 mg, 5 mg, Oral, TID, Kodi Cadena MD, 5 mg at 25 1443    dextrose 5 % and sodium chloride 0.45 % infusion, 125 mL/hr, Intravenous, Continuous, Jeff Linder MD, Last Rate: 125 mL/hr at 25 0640, 125 mL/hr at 25 0640    dextrose 5 % and sodium chloride 0.45 % with KCl 10 mEq/L infusion, 175 mL/hr, Intravenous, Continuous, Jeff Linder MD, Last Rate: 175 mL/hr at 25 0350, 175  mL/hr at 03/24/25 0350    dilTIAZem (CARDIZEM) injection 10 mg, 10 mg, Intravenous, 4x Daily, Kodi Cadena MD, 10 mg at 03/23/25 2100    [Held by provider] dilTIAZem CD (CARDIZEM CD) 24 hr capsule 240 mg, 240 mg, Oral, Q24H, Kodi Cadena MD, 240 mg at 03/22/25 0831    enoxaparin sodium (LOVENOX) syringe 105 mg, 1 mg/kg, Subcutaneous, Q24H, Kodi Cadena MD, 105 mg at 03/23/25 1654    famotidine (PEPCID) tablet 20 mg, 20 mg, Oral, Daily, Kodi Cadena MD    gabapentin (NEURONTIN) capsule 100 mg, 100 mg, Oral, Q8H, Kodi Cadena MD, 100 mg at 03/22/25 1443    HYDROmorphone (DILAUDID) injection 0.5 mg, 0.5 mg, Intravenous, Q4H PRN, Kodi Cadena MD, 0.5 mg at 03/22/25 1251    Lidocaine 4 % 1 patch, 1 patch, Transdermal, Q24H, Kodi Cadena MD, 1 patch at 03/23/25 1307    [Held by provider] lisinopril (PRINIVIL,ZESTRIL) tablet 40 mg, 40 mg, Oral, Daily, Kodi Cadena MD, 40 mg at 03/22/25 0831    Magnesium Standard Dose Replacement - Follow Nurse / BPA Driven Protocol, , Not Applicable, PRN, Kodi Cadena MD    ondansetron ODT (ZOFRAN-ODT) disintegrating tablet 4 mg, 4 mg, Oral, Q6H PRN **OR** ondansetron (ZOFRAN) injection 4 mg, 4 mg, Intravenous, Q6H PRN, Kodi Cadena MD, 4 mg at 03/23/25 1834    oxyCODONE (ROXICODONE) immediate release tablet 10 mg, 10 mg, Oral, Q4H PRN, Kodi Cadena MD, 10 mg at 03/21/25 2018    oxyCODONE (ROXICODONE) immediate release tablet 5 mg, 5 mg, Oral, Q4H PRN, Surinder, Kodi H, MD    Phosphorus Replacement - Follow Nurse / BPA Driven Protocol, , Not Applicable, Surinder COOK John H, MD    potassium chloride 10 mEq in 100 mL IVPB, 10 mEq, Intravenous, Q1H, Kodi Cadena MD    Potassium Replacement - Follow Nurse / BPA Driven Protocol, , Not Applicable, Surinder COOK John H, MD    prochlorperazine (COMPAZINE) injection 5 mg, 5 mg, Intravenous, Q6H PRN, Kodi Cadena MD, 5 mg at 03/22/25 1718    sodium chloride 0.9 % flush 10 mL, 10 mL, Intravenous, Q12H, Kodi Cadena MD, 10 mL at 03/22/25 0832     sodium chloride 0.9 % flush 10 mL, 10 mL, Intravenous, PRN, Kodi Cadena MD    sodium chloride 0.9 % infusion 40 mL, 40 mL, Intravenous, LEONORANSurinder John H, MD      OBJECTIVE     Vitals:    03/24/25 0758   BP: 100/60   Pulse: 95   Resp: 18   Temp: 98 °F (36.7 °C)   SpO2: 91%       Temp:  [97.7 °F (36.5 °C)-98.3 °F (36.8 °C)] 98 °F (36.7 °C)  Heart Rate:  [] 95  Resp:  [18] 18  BP: ()/(55-81) 100/60     PHYSICAL EXAM   Physical Exam  Vitals and nursing note reviewed.   Constitutional:       General: He is awake.      Appearance: Normal appearance. He is obese.      Comments: Body mass index is 34.07 kg/m².    HENT:      Head: Normocephalic and atraumatic.      Nose:      Comments: NGT in place  Eyes:      General: Lids are normal. Gaze aligned appropriately.   Cardiovascular:      Rate and Rhythm: Normal rate and regular rhythm.   Pulmonary:      Effort: Pulmonary effort is normal. No respiratory distress.   Abdominal:      General: There is distension.      Tenderness: There is abdominal tenderness (Tenderness around incisions).      Comments: Incisions are clean dry and intact.   Musculoskeletal:      Cervical back: Normal range of motion and neck supple.   Skin:     General: Skin is warm and dry.   Neurological:      Mental Status: He is alert and oriented to person, place, and time.   Psychiatric:         Attention and Perception: Attention normal.         Mood and Affect: Mood normal.         Speech: Speech normal.         Behavior: Behavior is cooperative.            Results Review:  Lab Results (last 48 hours)       Procedure Component Value Units Date/Time    Phosphorus [[id number]]  (Normal) Collected: 03/24/25 0437    Specimen: Blood Updated: 03/24/25 0548     Phosphorus 2.9 mg/dL     Procalcitonin [[id number]]  (Normal) Collected: 03/24/25 0437    Specimen: Blood Updated: 03/24/25 0547     Procalcitonin 0.18 ng/mL     Narrative:      As a Marker for Sepsis (Non-Neonates):    1. <0.5 ng/mL  "represents a low risk of severe sepsis and/or septic shock.  2. >2 ng/mL represents a high risk of severe sepsis and/or septic shock.    As a Marker for Lower Respiratory Tract Infections that require antibiotic therapy:    PCT on Admission    Antibiotic Therapy       6-12 Hrs later    >0.5                Strongly Recommended  >0.25 - <0.5        Recommended   0.1 - 0.25          Discouraged              Remeasure/reassess PCT  <0.1                Strongly Discouraged     Remeasure/reassess PCT    As 28 day mortality risk marker: \"Change in Procalcitonin Result\" (>80% or <=80%) if Day 0 (or Day 1) and Day 4 values are available. Refer to http://[email or web address]Drop[facility]pct-calculator.com    Change in PCT <=80%  A decrease of PCT levels below or equal to 80% defines a positive change in PCT test result representing a higher risk for 28-day all-cause mortality of patients diagnosed with severe sepsis for septic shock.    Change in PCT >80%  A decrease of PCT levels of more than 80% defines a negative change in PCT result representing a lower risk for 28-day all-cause mortality of patients diagnosed with severe sepsis or septic shock.       Comprehensive Metabolic Panel [[id number]]  (Abnormal) Collected: 03/24/25 0437    Specimen: Blood Updated: 03/24/25 0545     Glucose 129 mg/dL      BUN 33 mg/dL      Creatinine 2.12 mg/dL      Sodium 137 mmol/L      Potassium 3.5 mmol/L      Chloride 90 mmol/L      CO2 34.0 mmol/L      Calcium 9.1 mg/dL      Total Protein 6.5 g/dL      Albumin 3.3 g/dL      ALT (SGPT) 11 U/L      AST (SGOT) 20 U/L      Alkaline Phosphatase 33 U/L      Total Bilirubin 0.5 mg/dL      Globulin 3.2 gm/dL      A/G Ratio 1.0 g/dL      BUN/Creatinine Ratio 15.6     Anion Gap 13.0 mmol/L      eGFR 31.7 mL/min/1.73     Narrative:      GFR Categories in Chronic Kidney Disease (CKD)      GFR Category          GFR (mL/min/1.73)    Interpretation  G1                     90 or greater         Normal or high (1)  G2             "          60-89                Mild decrease (1)  G3a                   45-59                Mild to moderate decrease  G3b                   30-44                Moderate to severe decrease  G4                    15-29                Severe decrease  G5                    14 or less           Kidney failure          (1)In the absence of evidence of kidney disease, neither GFR category G1 or G2 fulfill the criteria for CKD.    eGFR calculation 2021 CKD-EPI creatinine equation, which does not include race as a factor    Magnesium [[id number]]  (Normal) Collected: 03/24/25 0437    Specimen: Blood Updated: 03/24/25 0545     Magnesium 1.9 mg/dL     CBC & Differential [[id number]]  (Abnormal) Collected: 03/24/25 0437    Specimen: Blood Updated: 03/24/25 0531    Narrative:      The following orders were created for panel order CBC & Differential.  Procedure                               Abnormality         Status                     ---------                               -----------         ------                     CBC Auto Differential[[id number]]        Abnormal            Final result                 Please view results for these tests on the individual orders.    CBC Auto Differential [[id number]]  (Abnormal) Collected: 03/24/25 0437    Specimen: Blood Updated: 03/24/25 0531     WBC 5.01 10*3/mm3      RBC 3.44 10*6/mm3      Hemoglobin 10.4 g/dL      Hematocrit 32.4 %      MCV 94.2 fL      MCH 30.2 pg      MCHC 32.1 g/dL      RDW 12.5 %      RDW-SD 43.1 fl      MPV 10.7 fL      Platelets 352 10*3/mm3      Neutrophil % 69.2 %      Lymphocyte % 14.0 %      Monocyte % 14.4 %      Eosinophil % 1.8 %      Basophil % 0.2 %      Immature Grans % 0.4 %      Neutrophils, Absolute 3.47 10*3/mm3      Lymphocytes, Absolute 0.70 10*3/mm3      Monocytes, Absolute 0.72 10*3/mm3      Eosinophils, Absolute 0.09 10*3/mm3      Basophils, Absolute 0.01 10*3/mm3      Immature Grans, Absolute 0.02 10*3/mm3      nRBC 0.0 /100 WBC      "Procalcitonin [[id number]]  (Normal) Collected: 03/23/25 1756    Specimen: Blood Updated: 03/23/25 1910     Procalcitonin 0.23 ng/mL     Narrative:      As a Marker for Sepsis (Non-Neonates):    1. <0.5 ng/mL represents a low risk of severe sepsis and/or septic shock.  2. >2 ng/mL represents a high risk of severe sepsis and/or septic shock.    As a Marker for Lower Respiratory Tract Infections that require antibiotic therapy:    PCT on Admission    Antibiotic Therapy       6-12 Hrs later    >0.5                Strongly Recommended  >0.25 - <0.5        Recommended   0.1 - 0.25          Discouraged              Remeasure/reassess PCT  <0.1                Strongly Discouraged     Remeasure/reassess PCT    As 28 day mortality risk marker: \"Change in Procalcitonin Result\" (>80% or <=80%) if Day 0 (or Day 1) and Day 4 values are available. Refer to http://www.Icebergpct-calculator.com    Change in PCT <=80%  A decrease of PCT levels below or equal to 80% defines a positive change in PCT test result representing a higher risk for 28-day all-cause mortality of patients diagnosed with severe sepsis for septic shock.    Change in PCT >80%  A decrease of PCT levels of more than 80% defines a negative change in PCT result representing a lower risk for 28-day all-cause mortality of patients diagnosed with severe sepsis or septic shock.       Potassium [[id number]]  (Normal) Collected: 03/23/25 1756    Specimen: Blood Updated: 03/23/25 1822     Potassium 3.5 mmol/L     Basic Metabolic Panel [[id number]]  (Abnormal) Collected: 03/23/25 1422    Specimen: Blood Updated: 03/23/25 1501     Glucose 115 mg/dL      BUN 32 mg/dL      Creatinine 3.05 mg/dL      Sodium 137 mmol/L      Potassium 3.7 mmol/L      Chloride 92 mmol/L      CO2 32.0 mmol/L      Calcium 9.0 mg/dL      BUN/Creatinine Ratio 10.5     Anion Gap 13.0 mmol/L      eGFR 20.5 mL/min/1.73     Narrative:      GFR Categories in Chronic Kidney Disease (CKD)      GFR Category       "    GFR (mL/min/1.73)    Interpretation  G1                     90 or greater         Normal or high (1)  G2                      60-89                Mild decrease (1)  G3a                   45-59                Mild to moderate decrease  G3b                   30-44                Moderate to severe decrease  G4                    15-29                Severe decrease  G5                    14 or less           Kidney failure          (1)In the absence of evidence of kidney disease, neither GFR category G1 or G2 fulfill the criteria for CKD.    eGFR calculation 2021 CKD-EPI creatinine equation, which does not include race as a factor    Basic Metabolic Panel [[id number]]  (Abnormal) Collected: 03/23/25 1022    Specimen: Blood Updated: 03/23/25 1100     Glucose 122 mg/dL      BUN 31 mg/dL      Creatinine 3.40 mg/dL      Sodium 136 mmol/L      Potassium 3.6 mmol/L      Chloride 91 mmol/L      CO2 33.0 mmol/L      Calcium 9.2 mg/dL      BUN/Creatinine Ratio 9.1     Anion Gap 12.0 mmol/L      eGFR 18.0 mL/min/1.73     Narrative:      GFR Categories in Chronic Kidney Disease (CKD)      GFR Category          GFR (mL/min/1.73)    Interpretation  G1                     90 or greater         Normal or high (1)  G2                      60-89                Mild decrease (1)  G3a                   45-59                Mild to moderate decrease  G3b                   30-44                Moderate to severe decrease  G4                    15-29                Severe decrease  G5                    14 or less           Kidney failure          (1)In the absence of evidence of kidney disease, neither GFR category G1 or G2 fulfill the criteria for CKD.    eGFR calculation 2021 CKD-EPI creatinine equation, which does not include race as a factor    CBC (No Diff) [[id number]]  (Abnormal) Collected: 03/23/25 0901    Specimen: Blood Updated: 03/23/25 0935     WBC 8.50 10*3/mm3      RBC 3.76 10*6/mm3      Hemoglobin 11.3 g/dL       Hematocrit 35.1 %      MCV 93.4 fL      MCH 30.1 pg      MCHC 32.2 g/dL      RDW 12.6 %      RDW-SD 43.3 fl      MPV 10.6 fL      Platelets 413 10*3/mm3           Imaging Results (Last 72 Hours)       Procedure Component Value Units Date/Time    CT Abdomen Pelvis Without Contrast [[id number]] Collected: 03/23/25 1901     Updated: 03/23/25 1913    Narrative:      EXAM/TECHNIQUE: CT abdomen pelvis with out IV contrast, with oral  contrast.     INDICATION: Postoperative ileus versus obstruction, rule out anastomotic  leak; D12.6-Benign neoplasm of colon, unspecified; D12.6-Benign neoplasm  of colon, unspecified     COMPARISON: CT 2/11/2025     DLP: 742.19 mGy.cm. Automated exposure control was utilized to decrease  patient radiation dose.     FINDINGS:     Mild atelectasis in the lung bases. Unenhanced liver, gallbladder,  biliary tree, pancreas, spleen, and adrenal glands are unremarkable. No  urolithiasis or hydronephrosis. No focal urinary bladder wall  thickening.     Postoperative change of right hemicolectomy. Right upper quadrant  ileocolonic anastomosis appears intact. Small to moderate volume  scattered locules of pneumoperitoneum throughout the upper abdomen.  Moderate diffuse small bowel distention with small bowel loops measuring  up to 3.9 cm diameter. No area of transition point or decompressed  distal small bowel. Oral contrast has only progressed into the region of  the mid small bowel but has not transited the entire small bowel or  reached the ileocolonic anastomosis. Enteric tube is in the mid stomach.  Trace free intraperitoneal fluid.     No pelvic mass or pelvic collection. Prostate is mildly enlarged.  Seminal vesicles are symmetric. Nonaneurysmal atherosclerotic abdominal  aorta. No enlarged abdominal or pelvic lymphadenopathy. Small bilateral  fat-containing inguinal hernias scattered locules of gas in the  abdominal wall soft tissues and diffuse abdominal stranding, likely  postoperative  change. No acute osseous finding.       Impression:         1.  Postoperative change of right hemicolectomy. Right upper quadrant  ileocolonic anastomosis appears intact. Oral contrast has not reached  the region of the anastomosis at the time of imaging limiting evaluation  for detection of leak. However, no significant gas, fluid, or stranding  adjacent to the anastomosis to suggest a leak. There is some small to  moderate volume scattered pneumoperitoneum in the upper abdomen, but I  suspect this represents residual postoperative gas. If concern for  postoperative leak remains high, consider delayed imaging and 4 to 8  hours after contrast has had more time to progress and reach the area of  anastomosis.     2.  Moderate diffuse small bowel distention without evidence of  transition point or decompressed distal loops. Findings are most  suggestive of postoperative ileus.           This report was signed and finalized on 3/23/2025 7:10 PM by Dr. Vito Farnsworth MD.       XR Abdomen KUB [[id number]] Collected: 03/23/25 0847     Updated: 03/23/25 0852    Narrative:      EXAMINATION:  XR ABDOMEN KUB-  3/22/2025 9:44 PM     HISTORY: NG tube placement.     COMPARISON: 3/22/2022.     TECHNIQUE: Supine image of the lower chest and upper abdomen.     FINDINGS:   The NG tube tip and sideport are in the stomach. Dilated  small bowel is visualized measuring about 5 cm. There is a small lucency  beneath the right hemidiaphragm. This may be an artifact on supine  imaging. A tiny amount of free air is possible. The patient had fairly  recent abdominal surgery.          Impression:      1. NG tube tip and sideport in the stomach.  2. Dilated small bowel.  3. Questionable tiny amount of free air beneath the right hemidiaphragm.  Recent surgery.           This report was signed and finalized on 3/23/2025 8:49 AM by Dr. Devonte Mckeon MD.       XR Abdomen KUB [[id number]] Collected: 03/22/25 1402     Updated: 03/22/25 1403     Narrative:      EXAMINATION:  XR ABDOMEN KUB-  3/22/2025 12:52 PM     HISTORY: Status post right hemicolectomy. Abdominal distention. Concern  for ileus; D12.6-Benign neoplasm of colon, unspecified; D12.6-Benign  neoplasm of colon, unspecified.     COMPARISON: No comparison study.     TECHNIQUE: Supine abdomen.     FINDINGS:   There are multiple air-filled small bowel loops. Small bowel  measures up to 4.8 cm. I do not see a significant amount of air in the  colon. There is no definite organomegaly or mass effect. No acute bony  abnormality is seen.          Impression:      Dilated small bowel measuring up to 4.8 cm. No significant  air in the colon. Postoperative ileus is possible. Small bowel  obstruction is not ruled out.           This report was signed and finalized on 3/22/2025 2:06 PM by Dr. Devonte Mckeon MD.                  ASSESSMENT/PLAN     Active Hospital Problems    Diagnosis     **Dysplastic colon polyp     Atrial fibrillation with rapid ventricular response     Tubular adenoma of colon     High grade dysplasia in colonic adenoma        PLAN:   Decreasing IV fluids today.   Will continue NG tube today due to high volume of output.   Will continue to monitor.     Discussed findings and treatment plan including risks, benefits, and treatment options with patient in detail. Patient agreed with treatment plan.      This document has been electronically signed by NEVAEH Almanza on 3/24/2025 08:03 CDT

## 2025-03-24 NOTE — CASE MANAGEMENT/SOCIAL WORK
Continued Stay Note  ERIK Cox     Patient Name: Kodi Stark  MRN: [id number]  Today's Date: 3/24/2025    Admit Date: 3/19/2025    Plan: Home   Discharge Plan       Row Name 03/24/25 1347       Plan    Plan Home    Patient/Family in Agreement with Plan yes    Plan Comments Pt has NG tube. At this time plan is for pt to return home at d/c. No needs identified.                   Discharge Codes    No documentation.                 Expected Discharge Date and Time       Expected Discharge Date Expected Discharge Time    Mar 22, 2025               NAKUL Stewart

## 2025-03-24 NOTE — PLAN OF CARE
Goal Outcome Evaluation:  Plan of Care Reviewed With: patient        Progress: improving  Outcome Evaluation: Pt A&O x4. VSS. C/o pain. No medication coverage required at this time. Potassium replaced per orders. Amio weaned per orders. Steri strips removed by MD from [facility] lap site. Mepilex to cover. Changed due to drainage. Safety maintained.

## 2025-03-25 LAB
ALBUMIN SERPL-MCNC: 3.2 G/DL (ref 3.5–5.2)
ALBUMIN/GLOB SERPL: 0.9 G/DL
ALP SERPL-CCNC: 36 U/L (ref 39–117)
ALT SERPL W P-5'-P-CCNC: 9 U/L (ref 1–41)
ANION GAP SERPL CALCULATED.3IONS-SCNC: 12 MMOL/L (ref 5–15)
AST SERPL-CCNC: 17 U/L (ref 1–40)
BASOPHILS # BLD AUTO: 0.02 10*3/MM3 (ref 0–0.2)
BASOPHILS NFR BLD AUTO: 0.4 % (ref 0–1.5)
BILIRUB SERPL-MCNC: 0.4 MG/DL (ref 0–1.2)
BUN SERPL-MCNC: 27 MG/DL (ref 8–23)
BUN/CREAT SERPL: 24.5 (ref 7–25)
CALCIUM SPEC-SCNC: 8.8 MG/DL (ref 8.6–10.5)
CHLORIDE SERPL-SCNC: 92 MMOL/L (ref 98–107)
CO2 SERPL-SCNC: 33 MMOL/L (ref 22–29)
CREAT SERPL-MCNC: 1.1 MG/DL (ref 0.76–1.27)
DEPRECATED RDW RBC AUTO: 43 FL (ref 37–54)
EGFRCR SERPLBLD CKD-EPI 2021: 69.6 ML/MIN/1.73
EOSINOPHIL # BLD AUTO: 0.12 10*3/MM3 (ref 0–0.4)
EOSINOPHIL NFR BLD AUTO: 2.2 % (ref 0.3–6.2)
ERYTHROCYTE [DISTWIDTH] IN BLOOD BY AUTOMATED COUNT: 12.5 % (ref 12.3–15.4)
GLOBULIN UR ELPH-MCNC: 3.4 GM/DL
GLUCOSE SERPL-MCNC: 125 MG/DL (ref 65–99)
HCT VFR BLD AUTO: 30.8 % (ref 37.5–51)
HGB BLD-MCNC: 10 G/DL (ref 13–17.7)
IMM GRANULOCYTES # BLD AUTO: 0.02 10*3/MM3 (ref 0–0.05)
IMM GRANULOCYTES NFR BLD AUTO: 0.4 % (ref 0–0.5)
LYMPHOCYTES # BLD AUTO: 0.59 10*3/MM3 (ref 0.7–3.1)
LYMPHOCYTES NFR BLD AUTO: 10.6 % (ref 19.6–45.3)
MAGNESIUM SERPL-MCNC: 1.9 MG/DL (ref 1.6–2.4)
MCH RBC QN AUTO: 30.2 PG (ref 26.6–33)
MCHC RBC AUTO-ENTMCNC: 32.5 G/DL (ref 31.5–35.7)
MCV RBC AUTO: 93.1 FL (ref 79–97)
MONOCYTES # BLD AUTO: 0.76 10*3/MM3 (ref 0.1–0.9)
MONOCYTES NFR BLD AUTO: 13.7 % (ref 5–12)
NEUTROPHILS NFR BLD AUTO: 4.03 10*3/MM3 (ref 1.7–7)
NEUTROPHILS NFR BLD AUTO: 72.7 % (ref 42.7–76)
NRBC BLD AUTO-RTO: 0 /100 WBC (ref 0–0.2)
PHOSPHATE SERPL-MCNC: 2.4 MG/DL (ref 2.5–4.5)
PLATELET # BLD AUTO: 305 10*3/MM3 (ref 140–450)
PMV BLD AUTO: 10.2 FL (ref 6–12)
POTASSIUM SERPL-SCNC: 3.3 MMOL/L (ref 3.5–5.2)
POTASSIUM SERPL-SCNC: 3.6 MMOL/L (ref 3.5–5.2)
POTASSIUM SERPL-SCNC: 3.7 MMOL/L (ref 3.5–5.2)
PROCALCITONIN SERPL-MCNC: 0.13 NG/ML (ref 0–0.25)
PROT SERPL-MCNC: 6.6 G/DL (ref 6–8.5)
QT INTERVAL: 276 MS
QTC INTERVAL: 391 MS
RBC # BLD AUTO: 3.31 10*6/MM3 (ref 4.14–5.8)
SODIUM SERPL-SCNC: 137 MMOL/L (ref 136–145)
WBC NRBC COR # BLD AUTO: 5.54 10*3/MM3 (ref 3.4–10.8)

## 2025-03-25 PROCEDURE — 25010000002 POTASSIUM CHLORIDE 10 MEQ/100ML SOLUTION: Performed by: SURGERY

## 2025-03-25 PROCEDURE — 80053 COMPREHEN METABOLIC PANEL: CPT | Performed by: NURSE PRACTITIONER

## 2025-03-25 PROCEDURE — 84100 ASSAY OF PHOSPHORUS: CPT | Performed by: NURSE PRACTITIONER

## 2025-03-25 PROCEDURE — 84145 PROCALCITONIN (PCT): CPT | Performed by: NURSE PRACTITIONER

## 2025-03-25 PROCEDURE — 25010000002 AMIODARONE IN DEXTROSE 5% 360-4.14 MG/200ML-% SOLUTION: Performed by: SURGERY

## 2025-03-25 PROCEDURE — 25010000002 FUROSEMIDE PER 20 MG: Performed by: SURGERY

## 2025-03-25 PROCEDURE — 25010000002 ENOXAPARIN PER 10 MG: Performed by: SURGERY

## 2025-03-25 PROCEDURE — 85025 COMPLETE CBC W/AUTO DIFF WBC: CPT | Performed by: NURSE PRACTITIONER

## 2025-03-25 PROCEDURE — 84132 ASSAY OF SERUM POTASSIUM: CPT | Performed by: SURGERY

## 2025-03-25 PROCEDURE — 25010000002 METOCLOPRAMIDE PER 10 MG: Performed by: SURGERY

## 2025-03-25 PROCEDURE — 99024 POSTOP FOLLOW-UP VISIT: CPT | Performed by: NURSE PRACTITIONER

## 2025-03-25 PROCEDURE — 93005 ELECTROCARDIOGRAM TRACING: CPT | Performed by: SURGERY

## 2025-03-25 PROCEDURE — 83735 ASSAY OF MAGNESIUM: CPT | Performed by: NURSE PRACTITIONER

## 2025-03-25 PROCEDURE — 99222 1ST HOSP IP/OBS MODERATE 55: CPT | Performed by: STUDENT IN AN ORGANIZED HEALTH CARE EDUCATION/TRAINING PROGRAM

## 2025-03-25 RX ORDER — AMIODARONE HYDROCHLORIDE 200 MG/1
400 TABLET ORAL EVERY 8 HOURS SCHEDULED
Status: DISCONTINUED | OUTPATIENT
Start: 2025-03-25 | End: 2025-03-26 | Stop reason: HOSPADM

## 2025-03-25 RX ORDER — METOCLOPRAMIDE HYDROCHLORIDE 5 MG/ML
10 INJECTION INTRAMUSCULAR; INTRAVENOUS EVERY 8 HOURS SCHEDULED
Status: DISCONTINUED | OUTPATIENT
Start: 2025-03-25 | End: 2025-03-26 | Stop reason: HOSPADM

## 2025-03-25 RX ORDER — FUROSEMIDE 10 MG/ML
10 INJECTION INTRAMUSCULAR; INTRAVENOUS ONCE
Status: COMPLETED | OUTPATIENT
Start: 2025-03-25 | End: 2025-03-25

## 2025-03-25 RX ORDER — POTASSIUM CHLORIDE 1.5 G/1.58G
40 POWDER, FOR SOLUTION ORAL EVERY 4 HOURS
Status: COMPLETED | OUTPATIENT
Start: 2025-03-25 | End: 2025-03-25

## 2025-03-25 RX ADMIN — FUROSEMIDE 10 MG: 10 INJECTION, SOLUTION INTRAMUSCULAR; INTRAVENOUS at 12:48

## 2025-03-25 RX ADMIN — PANTOPRAZOLE SODIUM 40 MG: 40 INJECTION, POWDER, FOR SOLUTION INTRAVENOUS at 05:25

## 2025-03-25 RX ADMIN — METOCLOPRAMIDE HYDROCHLORIDE 10 MG: 5 INJECTION INTRAMUSCULAR; INTRAVENOUS at 14:24

## 2025-03-25 RX ADMIN — POTASSIUM CHLORIDE 10 MEQ: 10 INJECTION, SOLUTION INTRAVENOUS at 01:34

## 2025-03-25 RX ADMIN — POTASSIUM CHLORIDE 40 MEQ: 1.5 POWDER, FOR SOLUTION ORAL at 13:36

## 2025-03-25 RX ADMIN — DILTIAZEM HYDROCHLORIDE 10 MG: 5 INJECTION, SOLUTION INTRAVENOUS at 08:57

## 2025-03-25 RX ADMIN — ENOXAPARIN SODIUM 105 MG: 120 INJECTION SUBCUTANEOUS at 14:24

## 2025-03-25 RX ADMIN — LIDOCAINE 1 PATCH: 4 PATCH TOPICAL at 09:03

## 2025-03-25 RX ADMIN — METOCLOPRAMIDE HYDROCHLORIDE 10 MG: 5 INJECTION INTRAMUSCULAR; INTRAVENOUS at 09:03

## 2025-03-25 RX ADMIN — DEXTROSE MONOHYDRATE, SODIUM CHLORIDE, AND POTASSIUM CHLORIDE 75 ML/HR: 50; 4.5; .745 INJECTION, SOLUTION INTRAVENOUS at 02:02

## 2025-03-25 RX ADMIN — AMIODARONE HYDROCHLORIDE 0.5 MG/MIN: 1.8 INJECTION, SOLUTION INTRAVENOUS at 02:02

## 2025-03-25 RX ADMIN — POTASSIUM CHLORIDE 10 MEQ: 10 INJECTION, SOLUTION INTRAVENOUS at 03:30

## 2025-03-25 RX ADMIN — POTASSIUM CHLORIDE 40 MEQ: 1.5 POWDER, FOR SOLUTION ORAL at 17:29

## 2025-03-25 RX ADMIN — METOCLOPRAMIDE HYDROCHLORIDE 10 MG: 5 INJECTION INTRAMUSCULAR; INTRAVENOUS at 21:05

## 2025-03-25 RX ADMIN — Medication 10 ML: at 21:05

## 2025-03-25 NOTE — PLAN OF CARE
Goal Outcome Evaluation:  Plan of Care Reviewed With: patient        Progress: improving  Outcome Evaluation: Pt A&O x4. VSS. NG dc'd per Dr. Cadena. Diet advanced to clears. Pt tolerating well. Room air. Amio gtt complete. Cardio EP consult ordered for Afib RVR. Safety maintained.

## 2025-03-25 NOTE — PLAN OF CARE
Goal Outcome Evaluation:              Outcome Evaluation: Pt A&O x4. Tele running afib 100-120s at rest. Goes up [id number] with activity. Amio drip infusing. IVF. Replacing potassium. Pt havig multiple bowel movements. NG ADELFO. Safety maintained

## 2025-03-25 NOTE — PROGRESS NOTES
[facility] GENERAL SURGERY    PROGRESS NOTE    Today's Date: 03/25/25    Patient Name: Kodi Stark  MRN: [id number]  [facility]: [id number]  PCP: Holly Montoya MD  Attending Provider: Kodi Cadena MD  Length of Stay: 6    SUBJECTIVE     Kodi Stark was admitted on 3/19 for planned laparoscopic robotic assisted right colectomy with anastomosis for treatment of ascending colon adenomatous polyp with high-grade dysplasia.  Patient's recovery was complicated by postoperative ileus.  NG tube was placed on the night of 3/22 and has had significant output.      Patient reports multiple bowel movements passing of gas throughout the night.  Patient had 3800 mL out over the past 24 hours from NG tube.  Patient denies nausea.  Abdomen is much softer today.  Amio drip in place.  A-fib continues with heart rate of 100 [id number] at rest up to 140-150s with activity.  Creatinine down to 1.1 from 2.12 yesterday.      Visit Dx:    ICD-10-CM ICD-9-CM   1. Tubular adenoma of colon  D12.6 211.3   2. High grade dysplasia in colonic adenoma  D12.6 211.3       Hospital Problem List:     Dysplastic colon polyp    Tubular adenoma of colon    High grade dysplasia in colonic adenoma    Atrial fibrillation with rapid ventricular response      History:   Past Medical History:   Diagnosis Date    Abnormal ECG     Adenomatous colon polyp 2023    POSITIVE FOR CANCER AND REMOVED VIA COLONOSCOPY IN 2023    Arrhythmia 2/11/25    Pre-op    Asthma     Upon exertion    Cancer 5/23    1 colon polyp    Colon polyp     High cholesterol     Hypertension     New onset a-fib 02/11/2025    WITH RVR    Tubular adenoma of colon 02/04/2025    of the ascending colon with high grade dysplasia     Past Surgical History:   Procedure Laterality Date    COLON RESECTION Right 3/19/2025    Procedure: LAPAROSCOPIC RIGHT COLECTOMY WITH DAVINCI ROBOT (MINIMALLY INVASIVE COLON RESECTION), CONVERSION TO OPEN;  Surgeon: Kodi Cadena MD;  Location: [facility] OR;   Service: Robotics - DaVinci;  Laterality: Right;    COLONOSCOPY       Social History     Socioeconomic History    Marital status:    Tobacco Use    Smoking status: Former     Current packs/day: 0.00     Average packs/day: 1.5 packs/day for 40.0 years (60.0 ttl pk-yrs)     Types: Cigarettes     Start date:      Quit date:      Years since quittin.2     Passive exposure: Never    Smokeless tobacco: Never   Vaping Use    Vaping status: Never Used   Substance and Sexual Activity    Alcohol use: Yes     Alcohol/week: 24.0 standard drinks of alcohol     Types: 24 Cans of beer per week     Comment: DAILY BEER COUPLE A DAY    Drug use: Not Currently    Sexual activity: Defer       Allergies:  No Known Allergies    Medications:    Current Facility-Administered Medications:     [COMPLETED] amiodarone 150 mg in 100 mL D5W (loading dose), 150 mg, Intravenous, Once, 150 mg at 25 0556 **FOLLOWED BY** [] amiodarone 360 mg in 200 mL D5W infusion, 1 mg/min, Intravenous, Continuous, Last Rate: 33.3 mL/hr at 25 0617, 1 mg/min at 25 0617 **FOLLOWED BY** amiodarone 360 mg in 200 mL D5W infusion, 0.5 mg/min, Intravenous, Continuous, Kodi Cadena MD, Last Rate: 16.67 mL/hr at 25 0202, 0.5 mg/min at 25 0202    bisacodyl (DULCOLAX) suppository 10 mg, 10 mg, Rectal, BID, Kodi Cadena MD, 10 mg at 25 0953    Calcium Replacement - Follow Nurse / BPA Driven Protocol, , Not Applicable, PRN, Kodi Cadena MD    cyclobenzaprine (FLEXERIL) tablet 5 mg, 5 mg, Oral, TID, Kodi Cdaena MD, 5 mg at 25 1443    dextrose 5 % and sodium chloride 0.45 % with KCl 10 mEq/L infusion, 75 mL/hr, Intravenous, Continuous, Lucy Bruce APRN, Last Rate: 75 mL/hr at 25 0202, 75 mL/hr at 25 0202    dilTIAZem (CARDIZEM) injection 10 mg, 10 mg, Intravenous, 4x Daily, Kodi Cadena MD, 10 mg at 25    [Held by provider] dilTIAZem CD (CARDIZEM CD) 24 hr capsule 240 mg, 240  mg, Oral, Q24H, Kodi Cadena MD, 240 mg at 03/22/25 0831    enoxaparin sodium (LOVENOX) syringe 105 mg, 1 mg/kg, Subcutaneous, Q24H, Kodi Cadena MD, 105 mg at 03/24/25 1538    Lidocaine 4 % 1 patch, 1 patch, Transdermal, Q24H, Kodi Cadena MD, 1 patch at 03/24/25 1021    [Held by provider] lisinopril (PRINIVIL,ZESTRIL) tablet 40 mg, 40 mg, Oral, Daily, Kodi Cadena MD, 40 mg at 03/22/25 0831    Magnesium Standard Dose Replacement - Follow Nurse / BPA Driven Protocol, , Not Applicable, PRSurinder DUDLEY John H, MD    ondansetron ODT (ZOFRAN-ODT) disintegrating tablet 4 mg, 4 mg, Oral, Q6H PRN **OR** ondansetron (ZOFRAN) injection 4 mg, 4 mg, Intravenous, Q6H PRN, Kodi Cadena MD, 4 mg at 03/23/25 1834    pantoprazole (PROTONIX) injection 40 mg, 40 mg, Intravenous, Q AM, Kodi Cadena MD, 40 mg at 03/25/25 0525    Phosphorus Replacement - Follow Nurse / BPA Driven Protocol, , Not Applicable, PRSurinder DUDLEY John H, MD    Potassium Replacement - Follow Nurse / BPA Driven Protocol, , Not Applicable, Surinder COOK John H, MD    prochlorperazine (COMPAZINE) injection 5 mg, 5 mg, Intravenous, Q6H PRN, Kodi Cadena MD, 5 mg at 03/22/25 1718    sodium chloride 0.9 % flush 10 mL, 10 mL, Intravenous, Q12H, Kodi Cadena MD, 10 mL at 03/24/25 1013    sodium chloride 0.9 % flush 10 mL, 10 mL, Intravenous, PRN, Kodi Cadena MD    sodium chloride 0.9 % infusion 40 mL, 40 mL, Intravenous, PRN, Kodi Cadena MD      OBJECTIVE     Vitals:    03/25/25 0400   BP: 126/81   Pulse: 109   Resp: 18   Temp: 98.2 °F (36.8 °C)   SpO2: 93%       Temp:  [97.5 °F (36.4 °C)-98.4 °F (36.9 °C)] 98.2 °F (36.8 °C)  Heart Rate:  [] 109  Resp:  [16-18] 18  BP: (100-126)/(60-84) 126/81     PHYSICAL EXAM   Physical Exam  Vitals and nursing note reviewed.   Constitutional:       General: He is awake.      Appearance: Normal appearance. He is obese.      Comments: Body mass index is 34.07 kg/m².    HENT:      Head: Normocephalic and atraumatic.   Eyes:       "General: Lids are normal. Gaze aligned appropriately.   Cardiovascular:      Rate and Rhythm: Tachycardia present. Rhythm irregular.      Comments: Afib 100-120's at rest, 140-150's w/ activity  Pulmonary:      Effort: Pulmonary effort is normal. No respiratory distress.   Abdominal:      General: Abdomen is protuberant.      Palpations: Abdomen is soft.      Tenderness: There is no abdominal tenderness.   Musculoskeletal:      Cervical back: Normal range of motion and neck supple.   Skin:     General: Skin is warm and dry.      Comments: LLQ incision steristrips removed and optifoam dressing in place. Draining serosanguinous drainage. No erythema present. No tenderness to site.    Neurological:      Mental Status: He is alert and oriented to person, place, and time.   Psychiatric:         Attention and Perception: Attention normal.         Mood and Affect: Mood normal.         Speech: Speech normal.         Behavior: Behavior is cooperative.            Results Review:  Lab Results (last 48 hours)       Procedure Component Value Units Date/Time    Procalcitonin [[id number]]  (Normal) Collected: 03/25/25 0304    Specimen: Blood Updated: 03/25/25 0418     Procalcitonin 0.13 ng/mL     Narrative:      As a Marker for Sepsis (Non-Neonates):    1. <0.5 ng/mL represents a low risk of severe sepsis and/or septic shock.  2. >2 ng/mL represents a high risk of severe sepsis and/or septic shock.    As a Marker for Lower Respiratory Tract Infections that require antibiotic therapy:    PCT on Admission    Antibiotic Therapy       6-12 Hrs later    >0.5                Strongly Recommended  >0.25 - <0.5        Recommended   0.1 - 0.25          Discouraged              Remeasure/reassess PCT  <0.1                Strongly Discouraged     Remeasure/reassess PCT    As 28 day mortality risk marker: \"Change in Procalcitonin Result\" (>80% or <=80%) if Day 0 (or Day 1) and Day 4 values are available. Refer to " http://[email or web address]-pct-calculator.com    Change in PCT <=80%  A decrease of PCT levels below or equal to 80% defines a positive change in PCT test result representing a higher risk for 28-day all-cause mortality of patients diagnosed with severe sepsis for septic shock.    Change in PCT >80%  A decrease of PCT levels of more than 80% defines a negative change in PCT result representing a lower risk for 28-day all-cause mortality of patients diagnosed with severe sepsis or septic shock.       Comprehensive Metabolic Panel [[id number]]  (Abnormal) Collected: 03/25/25 0304    Specimen: Blood Updated: 03/25/25 0412     Glucose 125 mg/dL      BUN 27 mg/dL      Creatinine 1.10 mg/dL      Sodium 137 mmol/L      Potassium 3.3 mmol/L      Chloride 92 mmol/L      CO2 33.0 mmol/L      Calcium 8.8 mg/dL      Total Protein 6.6 g/dL      Albumin 3.2 g/dL      ALT (SGPT) 9 U/L      AST (SGOT) 17 U/L      Alkaline Phosphatase 36 U/L      Total Bilirubin 0.4 mg/dL      Globulin 3.4 gm/dL      A/G Ratio 0.9 g/dL      BUN/Creatinine Ratio 24.5     Anion Gap 12.0 mmol/L      eGFR 69.6 mL/min/1.73     Narrative:      GFR Categories in Chronic Kidney Disease (CKD)      GFR Category          GFR (mL/min/1.73)    Interpretation  G1                     90 or greater         Normal or high (1)  G2                      60-89                Mild decrease (1)  G3a                   45-59                Mild to moderate decrease  G3b                   30-44                Moderate to severe decrease  G4                    15-29                Severe decrease  G5                    14 or less           Kidney failure          (1)In the absence of evidence of kidney disease, neither GFR category G1 or G2 fulfill the criteria for CKD.    eGFR calculation 2021 CKD-EPI creatinine equation, which does not include race as a factor    Magnesium [[id number]]  (Normal) Collected: 03/25/25 0304    Specimen: Blood Updated: 03/25/25 0412     Magnesium 1.9 mg/dL      Phosphorus [[id number]]  (Abnormal) Collected: 03/25/25 0304    Specimen: Blood Updated: 03/25/25 0412     Phosphorus 2.4 mg/dL     CBC & Differential [[id number]]  (Abnormal) Collected: 03/25/25 0304    Specimen: Blood Updated: 03/25/25 0346    Narrative:      The following orders were created for panel order CBC & Differential.  Procedure                               Abnormality         Status                     ---------                               -----------         ------                     CBC Auto Differential[[id number]]        Abnormal            Final result                 Please view results for these tests on the individual orders.    CBC Auto Differential [[id number]]  (Abnormal) Collected: 03/25/25 0304    Specimen: Blood Updated: 03/25/25 0346     WBC 5.54 10*3/mm3      RBC 3.31 10*6/mm3      Hemoglobin 10.0 g/dL      Hematocrit 30.8 %      MCV 93.1 fL      MCH 30.2 pg      MCHC 32.5 g/dL      RDW 12.5 %      RDW-SD 43.0 fl      MPV 10.2 fL      Platelets 305 10*3/mm3      Neutrophil % 72.7 %      Lymphocyte % 10.6 %      Monocyte % 13.7 %      Eosinophil % 2.2 %      Basophil % 0.4 %      Immature Grans % 0.4 %      Neutrophils, Absolute 4.03 10*3/mm3      Lymphocytes, Absolute 0.59 10*3/mm3      Monocytes, Absolute 0.76 10*3/mm3      Eosinophils, Absolute 0.12 10*3/mm3      Basophils, Absolute 0.02 10*3/mm3      Immature Grans, Absolute 0.02 10*3/mm3      nRBC 0.0 /100 WBC     Potassium [[id number]]  (Normal) Collected: 03/24/25 1827    Specimen: Blood Updated: 03/24/25 1936     Potassium 3.6 mmol/L     Tissue Pathology Exam [[id number]] Collected: 03/19/25 1121    Specimen: Tissue from Large Intestine, Right / Ascending Colon Updated: 03/24/25 0910     Note to Patients --     This report may contain a detailed description of human tissue sent by a health care provider to the laboratory for pathologic evaluation. The content of this report is essential for diagnosis and may provide important  critical findings. This information may be unfamiliar to patients to review without a medical professional present. It is advised that the patient review this report in the presence of a health care provider who can answer questions and explain the results.       Case Report --     Surgical Pathology Report                         Case: [id number]                                Authorizing Provider:  Kodi Cadena MD           Collected:           03/19/2025 11:21 AM        Ordering Location:     [facility]  Received:            03/19/2025 11:58 AM        Pathologist:           Thomas Coel MD                                                   Specimen:    Large Intestine, Right / Ascending Colon                                                  Clinical Information --     Adenomatous polyp with at least high-grade dysplasia (no evidence of invasion in sample) on ascending colon mass biopsy ([facility]) [id number]; 02/03/25, biopsies A, B, and E separate ascending (A and B) and descending (E) colon polyps, each with tubular adenoma negative for high-grade dysplasia, biopsy D a transverse colon tubular adenoma negative for high-grade dysplasia, with synchronous submucosal lipoma, and biopsy F a rectal hyperplastic polyp. 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       Final Diagnosis --     Terminal ileum and ascending colon  "with appendix, segmental resection:  -   Proximal colon including area of tattoo, and proximal bowel end, with full-thickness bowel wall without focal tattoo and no further diagnostic alterations (negative for adenoma/adenomatous epithelial changes, dysplasia, and malignancy).  -   Fifteen benign-appearing lymph nodes with mild cortical hyperplasia, some with tattoo pigment (negative for metastatic malignancy).  -   Ileum sample (proximal bowel end) with full-thickness bowel wall without diagnostic alterations.  -   Sampled mesentery showing focally congested/hemorrhagic mature adipose tissue without further diagnostic alterations.  -   Appendix, complete cross-sections, and complete longitudinal sections of the tip, without diagnostic alterations.       Gross Description --     1. Large Intestine, Right / Ascending Colon.  Received in a correctly labeled formalin filled container as \"large intestine, right/ascending colon\" is a 2 cm long x 2 cm diameter segment of terminal ileum and 38 x 3.2 cm contiguous segment of proximal colon, with fatty mesocolon up to 6.4 cm wide. The proximal bowel end is inked blue and the distal black. There is an area of pigmented tattooing approximately 2.8 cm from the distal and 26 cm from the proximal bowel ends. On opening the bowel contiguously, mucosa in this region underlying the tattoo is smooth without discrete findings including evidence of a prior biopsy.  The remaining small and large bowel mucosa is free of discrete lesions. The attached 6.8 x 0.8 cm appendix shows no discrete findings on initial inspection, cross-sections of the body, or longitudinal sections of the tip. The surrounding mesentery is searched for lymph nodes, identifying numerous to 5 mm candidates. Separate in the container is omental fat measuring 41.8 x 40.0 x 2.5 cm, thinly sectioned to reveal only unremarkable fatty tissue. Sampled in fifteen cassettes:  1A, appendix (bisected tip and luminal " "cross-section  1B, proximal bowel end  1C, distal bowel end  1D- 1K, sections from tattooed area  1L, omentum  1M-1O, lymph node candidates       Microscopic Description --     Microscopic evaluation was performed.    This report was dictated using Dragon verbal recognition software. Though it was subsequently proof-read by the pathologist-of-record, with efforts made to correct omissions and/or errors, if you note same, please call the laboratory or pathologist-of-record to have it promptly addressed.      Phosphorus [[id number]]  (Normal) Collected: 03/24/25 0437    Specimen: Blood Updated: 03/24/25 0548     Phosphorus 2.9 mg/dL     Procalcitonin [[id number]]  (Normal) Collected: 03/24/25 0437    Specimen: Blood Updated: 03/24/25 0547     Procalcitonin 0.18 ng/mL     Narrative:      As a Marker for Sepsis (Non-Neonates):    1. <0.5 ng/mL represents a low risk of severe sepsis and/or septic shock.  2. >2 ng/mL represents a high risk of severe sepsis and/or septic shock.    As a Marker for Lower Respiratory Tract Infections that require antibiotic therapy:    PCT on Admission    Antibiotic Therapy       6-12 Hrs later    >0.5                Strongly Recommended  >0.25 - <0.5        Recommended   0.1 - 0.25          Discouraged              Remeasure/reassess PCT  <0.1                Strongly Discouraged     Remeasure/reassess PCT    As 28 day mortality risk marker: \"Change in Procalcitonin Result\" (>80% or <=80%) if Day 0 (or Day 1) and Day 4 values are available. Refer to http://www.[facility]-pct-calculator.com    Change in PCT <=80%  A decrease of PCT levels below or equal to 80% defines a positive change in PCT test result representing a higher risk for 28-day all-cause mortality of patients diagnosed with severe sepsis for septic shock.    Change in PCT >80%  A decrease of PCT levels of more than 80% defines a negative change in PCT result representing a lower risk for 28-day all-cause mortality of patients diagnosed " with severe sepsis or septic shock.       Comprehensive Metabolic Panel [[id number]]  (Abnormal) Collected: 03/24/25 0437    Specimen: Blood Updated: 03/24/25 0545     Glucose 129 mg/dL      BUN 33 mg/dL      Creatinine 2.12 mg/dL      Sodium 137 mmol/L      Potassium 3.5 mmol/L      Chloride 90 mmol/L      CO2 34.0 mmol/L      Calcium 9.1 mg/dL      Total Protein 6.5 g/dL      Albumin 3.3 g/dL      ALT (SGPT) 11 U/L      AST (SGOT) 20 U/L      Alkaline Phosphatase 33 U/L      Total Bilirubin 0.5 mg/dL      Globulin 3.2 gm/dL      A/G Ratio 1.0 g/dL      BUN/Creatinine Ratio 15.6     Anion Gap 13.0 mmol/L      eGFR 31.7 mL/min/1.73     Narrative:      GFR Categories in Chronic Kidney Disease (CKD)      GFR Category          GFR (mL/min/1.73)    Interpretation  G1                     90 or greater         Normal or high (1)  G2                      60-89                Mild decrease (1)  G3a                   45-59                Mild to moderate decrease  G3b                   30-44                Moderate to severe decrease  G4                    15-29                Severe decrease  G5                    14 or less           Kidney failure          (1)In the absence of evidence of kidney disease, neither GFR category G1 or G2 fulfill the criteria for CKD.    eGFR calculation 2021 CKD-EPI creatinine equation, which does not include race as a factor    Magnesium [[id number]]  (Normal) Collected: 03/24/25 0437    Specimen: Blood Updated: 03/24/25 0545     Magnesium 1.9 mg/dL     CBC & Differential [[id number]]  (Abnormal) Collected: 03/24/25 0437    Specimen: Blood Updated: 03/24/25 0531    Narrative:      The following orders were created for panel order CBC & Differential.  Procedure                               Abnormality         Status                     ---------                               -----------         ------                     CBC Auto Differential[[id number]]        Abnormal            Final result    "              Please view results for these tests on the individual orders.    CBC Auto Differential [[id number]]  (Abnormal) Collected: 03/24/25 0437    Specimen: Blood Updated: 03/24/25 0531     WBC 5.01 10*3/mm3      RBC 3.44 10*6/mm3      Hemoglobin 10.4 g/dL      Hematocrit 32.4 %      MCV 94.2 fL      MCH 30.2 pg      MCHC 32.1 g/dL      RDW 12.5 %      RDW-SD 43.1 fl      MPV 10.7 fL      Platelets 352 10*3/mm3      Neutrophil % 69.2 %      Lymphocyte % 14.0 %      Monocyte % 14.4 %      Eosinophil % 1.8 %      Basophil % 0.2 %      Immature Grans % 0.4 %      Neutrophils, Absolute 3.47 10*3/mm3      Lymphocytes, Absolute 0.70 10*3/mm3      Monocytes, Absolute 0.72 10*3/mm3      Eosinophils, Absolute 0.09 10*3/mm3      Basophils, Absolute 0.01 10*3/mm3      Immature Grans, Absolute 0.02 10*3/mm3      nRBC 0.0 /100 WBC     Procalcitonin [[id number]]  (Normal) Collected: 03/23/25 1756    Specimen: Blood Updated: 03/23/25 1910     Procalcitonin 0.23 ng/mL     Narrative:      As a Marker for Sepsis (Non-Neonates):    1. <0.5 ng/mL represents a low risk of severe sepsis and/or septic shock.  2. >2 ng/mL represents a high risk of severe sepsis and/or septic shock.    As a Marker for Lower Respiratory Tract Infections that require antibiotic therapy:    PCT on Admission    Antibiotic Therapy       6-12 Hrs later    >0.5                Strongly Recommended  >0.25 - <0.5        Recommended   0.1 - 0.25          Discouraged              Remeasure/reassess PCT  <0.1                Strongly Discouraged     Remeasure/reassess PCT    As 28 day mortality risk marker: \"Change in Procalcitonin Result\" (>80% or <=80%) if Day 0 (or Day 1) and Day 4 values are available. Refer to http://www.[facility]-pct-calculator.com    Change in PCT <=80%  A decrease of PCT levels below or equal to 80% defines a positive change in PCT test result representing a higher risk for 28-day all-cause mortality of patients diagnosed with severe sepsis for " septic shock.    Change in PCT >80%  A decrease of PCT levels of more than 80% defines a negative change in PCT result representing a lower risk for 28-day all-cause mortality of patients diagnosed with severe sepsis or septic shock.       Potassium [[id number]]  (Normal) Collected: 03/23/25 1756    Specimen: Blood Updated: 03/23/25 1822     Potassium 3.5 mmol/L     Basic Metabolic Panel [[id number]]  (Abnormal) Collected: 03/23/25 1422    Specimen: Blood Updated: 03/23/25 1501     Glucose 115 mg/dL      BUN 32 mg/dL      Creatinine 3.05 mg/dL      Sodium 137 mmol/L      Potassium 3.7 mmol/L      Chloride 92 mmol/L      CO2 32.0 mmol/L      Calcium 9.0 mg/dL      BUN/Creatinine Ratio 10.5     Anion Gap 13.0 mmol/L      eGFR 20.5 mL/min/1.73     Narrative:      GFR Categories in Chronic Kidney Disease (CKD)      GFR Category          GFR (mL/min/1.73)    Interpretation  G1                     90 or greater         Normal or high (1)  G2                      60-89                Mild decrease (1)  G3a                   45-59                Mild to moderate decrease  G3b                   30-44                Moderate to severe decrease  G4                    15-29                Severe decrease  G5                    14 or less           Kidney failure          (1)In the absence of evidence of kidney disease, neither GFR category G1 or G2 fulfill the criteria for CKD.    eGFR calculation 2021 CKD-EPI creatinine equation, which does not include race as a factor    Basic Metabolic Panel [[id number]]  (Abnormal) Collected: 03/23/25 1022    Specimen: Blood Updated: 03/23/25 1100     Glucose 122 mg/dL      BUN 31 mg/dL      Creatinine 3.40 mg/dL      Sodium 136 mmol/L      Potassium 3.6 mmol/L      Chloride 91 mmol/L      CO2 33.0 mmol/L      Calcium 9.2 mg/dL      BUN/Creatinine Ratio 9.1     Anion Gap 12.0 mmol/L      eGFR 18.0 mL/min/1.73     Narrative:      GFR Categories in Chronic Kidney Disease (CKD)      GFR Category           GFR (mL/min/1.73)    Interpretation  G1                     90 or greater         Normal or high (1)  G2                      60-89                Mild decrease (1)  G3a                   45-59                Mild to moderate decrease  G3b                   30-44                Moderate to severe decrease  G4                    15-29                Severe decrease  G5                    14 or less           Kidney failure          (1)In the absence of evidence of kidney disease, neither GFR category G1 or G2 fulfill the criteria for CKD.    eGFR calculation 2021 CKD-EPI creatinine equation, which does not include race as a factor    CBC (No Diff) [[id number]]  (Abnormal) Collected: 03/23/25 0901    Specimen: Blood Updated: 03/23/25 0935     WBC 8.50 10*3/mm3      RBC 3.76 10*6/mm3      Hemoglobin 11.3 g/dL      Hematocrit 35.1 %      MCV 93.4 fL      MCH 30.1 pg      MCHC 32.2 g/dL      RDW 12.6 %      RDW-SD 43.3 fl      MPV 10.6 fL      Platelets 413 10*3/mm3           Imaging Results (Last 72 Hours)       Procedure Component Value Units Date/Time    XR Abdomen KUB [[id number]] Collected: 03/24/25 1033     Updated: 03/24/25 1038    Narrative:      EXAM: XR ABDOMEN KUB-      DATE: 3/24/2025 8:18 AM     HISTORY: Eval bowel gas pattern, ileus; D12.6-Benign neoplasm of colon,  unspecified; D12.6-Benign neoplasm of colon, unspecified       COMPARISON: 3/22/2025.     TECHNIQUE:   Frontal view of the abdomen. 1 image.     FINDINGS:    There are prominent air-filled loops of small bowel measuring 3.5 cm.  These have likely improved since the prior examination. There is bowel  gas distally in the colon. NG tube projects over the left upper quadrant  tip likely at the fundus of the stomach however continued attention on  follow-up is recommended.          Impression:         1. Prominent air-filled loops of small bowel appear improved since the  prior examination. FINDINGS may represent improving ileus. There  is  bowel gas more distally in the colon.  2. Tip of NG tube visualized may be at the fundus of the stomach but  appears somewhat superior in position. Continued attention on follow-up  is recommended.     This report was signed and finalized on 3/24/2025 10:35 AM by Dony Hernandez.       CT Abdomen Pelvis Without Contrast [[id number]] Collected: 03/23/25 1901     Updated: 03/23/25 1913    Narrative:      EXAM/TECHNIQUE: CT abdomen pelvis with out IV contrast, with oral  contrast.     INDICATION: Postoperative ileus versus obstruction, rule out anastomotic  leak; D12.6-Benign neoplasm of colon, unspecified; D12.6-Benign neoplasm  of colon, unspecified     COMPARISON: CT 2/11/2025     DLP: 742.19 mGy.cm. Automated exposure control was utilized to decrease  patient radiation dose.     FINDINGS:     Mild atelectasis in the lung bases. Unenhanced liver, gallbladder,  biliary tree, pancreas, spleen, and adrenal glands are unremarkable. No  urolithiasis or hydronephrosis. No focal urinary bladder wall  thickening.     Postoperative change of right hemicolectomy. Right upper quadrant  ileocolonic anastomosis appears intact. Small to moderate volume  scattered locules of pneumoperitoneum throughout the upper abdomen.  Moderate diffuse small bowel distention with small bowel loops measuring  up to 3.9 cm diameter. No area of transition point or decompressed  distal small bowel. Oral contrast has only progressed into the region of  the mid small bowel but has not transited the entire small bowel or  reached the ileocolonic anastomosis. Enteric tube is in the mid stomach.  Trace free intraperitoneal fluid.     No pelvic mass or pelvic collection. Prostate is mildly enlarged.  Seminal vesicles are symmetric. Nonaneurysmal atherosclerotic abdominal  aorta. No enlarged abdominal or pelvic lymphadenopathy. Small bilateral  fat-containing inguinal hernias scattered locules of gas in the  abdominal wall soft tissues and diffuse  abdominal stranding, likely  postoperative change. No acute osseous finding.       Impression:         1.  Postoperative change of right hemicolectomy. Right upper quadrant  ileocolonic anastomosis appears intact. Oral contrast has not reached  the region of the anastomosis at the time of imaging limiting evaluation  for detection of leak. However, no significant gas, fluid, or stranding  adjacent to the anastomosis to suggest a leak. There is some small to  moderate volume scattered pneumoperitoneum in the upper abdomen, but I  suspect this represents residual postoperative gas. If concern for  postoperative leak remains high, consider delayed imaging and 4 to 8  hours after contrast has had more time to progress and reach the area of  anastomosis.     2.  Moderate diffuse small bowel distention without evidence of  transition point or decompressed distal loops. Findings are most  suggestive of postoperative ileus.           This report was signed and finalized on 3/23/2025 7:10 PM by Dr. Vito Farnsworth MD.       XR Abdomen KUB [[id number]] Collected: 03/23/25 0847     Updated: 03/23/25 0852    Narrative:      EXAMINATION:  XR ABDOMEN KUB-  3/22/2025 9:44 PM     HISTORY: NG tube placement.     COMPARISON: 3/22/2022.     TECHNIQUE: Supine image of the lower chest and upper abdomen.     FINDINGS:   The NG tube tip and sideport are in the stomach. Dilated  small bowel is visualized measuring about 5 cm. There is a small lucency  beneath the right hemidiaphragm. This may be an artifact on supine  imaging. A tiny amount of free air is possible. The patient had fairly  recent abdominal surgery.          Impression:      1. NG tube tip and sideport in the stomach.  2. Dilated small bowel.  3. Questionable tiny amount of free air beneath the right hemidiaphragm.  Recent surgery.           This report was signed and finalized on 3/23/2025 8:49 AM by Dr. Devonte Mckeon MD.       XR Abdomen KUB [[id number]] Collected:  03/22/25 1402     Updated: 03/22/25 1409    Narrative:      EXAMINATION:  XR ABDOMEN KUB-  3/22/2025 12:52 PM     HISTORY: Status post right hemicolectomy. Abdominal distention. Concern  for ileus; D12.6-Benign neoplasm of colon, unspecified; D12.6-Benign  neoplasm of colon, unspecified.     COMPARISON: No comparison study.     TECHNIQUE: Supine abdomen.     FINDINGS:   There are multiple air-filled small bowel loops. Small bowel  measures up to 4.8 cm. I do not see a significant amount of air in the  colon. There is no definite organomegaly or mass effect. No acute bony  abnormality is seen.          Impression:      Dilated small bowel measuring up to 4.8 cm. No significant  air in the colon. Postoperative ileus is possible. Small bowel  obstruction is not ruled out.           This report was signed and finalized on 3/22/2025 2:06 PM by Dr. Devonte Mckeon MD.                  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 May consult electrophysiology for conversion pending EKG results.   Continue NG tube and NPO at this time.     Discussed findings and treatment plan including risks, benefits, and treatment options with patient in detail. Patient agreed with treatment plan.      This document has been electronically signed by NEVAEH Almanza on 3/25/2025 07:51 CDT

## 2025-03-26 ENCOUNTER — READMISSION MANAGEMENT (OUTPATIENT)
Dept: CALL CENTER | Facility: HOSPITAL | Age: 75
End: 2025-03-26
Payer: COMMERCIAL

## 2025-03-26 ENCOUNTER — APPOINTMENT (OUTPATIENT)
Dept: CARDIOLOGY | Facility: HOSPITAL | Age: 75
End: 2025-03-26
Payer: MEDICARE

## 2025-03-26 ENCOUNTER — ANESTHESIA (OUTPATIENT)
Dept: CARDIOLOGY | Facility: HOSPITAL | Age: 75
End: 2025-03-26
Payer: MEDICARE

## 2025-03-26 ENCOUNTER — ANESTHESIA EVENT (OUTPATIENT)
Dept: CARDIOLOGY | Facility: HOSPITAL | Age: 75
End: 2025-03-26
Payer: MEDICARE

## 2025-03-26 VITALS
BODY MASS INDEX: 34.02 KG/M2 | HEIGHT: 71 IN | DIASTOLIC BLOOD PRESSURE: 75 MMHG | WEIGHT: 243 LBS | SYSTOLIC BLOOD PRESSURE: 125 MMHG | TEMPERATURE: 98.2 F | HEART RATE: 79 BPM | RESPIRATION RATE: 16 BRPM | OXYGEN SATURATION: 98 %

## 2025-03-26 LAB
QT INTERVAL: 400 MS
QTC INTERVAL: 476 MS

## 2025-03-26 PROCEDURE — 93325 DOPPLER ECHO COLOR FLOW MAPG: CPT | Performed by: STUDENT IN AN ORGANIZED HEALTH CARE EDUCATION/TRAINING PROGRAM

## 2025-03-26 PROCEDURE — 25010000002 METOCLOPRAMIDE PER 10 MG: Performed by: SURGERY

## 2025-03-26 PROCEDURE — 25810000003 SODIUM CHLORIDE 0.9 % SOLUTION

## 2025-03-26 PROCEDURE — 93312 ECHO TRANSESOPHAGEAL: CPT

## 2025-03-26 PROCEDURE — 93005 ELECTROCARDIOGRAM TRACING: CPT | Performed by: STUDENT IN AN ORGANIZED HEALTH CARE EDUCATION/TRAINING PROGRAM

## 2025-03-26 PROCEDURE — 25010000002 LIDOCAINE PF 2% 2 % SOLUTION

## 2025-03-26 PROCEDURE — 92960 CARDIOVERSION ELECTRIC EXT: CPT | Performed by: STUDENT IN AN ORGANIZED HEALTH CARE EDUCATION/TRAINING PROGRAM

## 2025-03-26 PROCEDURE — 92960 CARDIOVERSION ELECTRIC EXT: CPT

## 2025-03-26 PROCEDURE — 93321 DOPPLER ECHO F-UP/LMTD STD: CPT | Performed by: STUDENT IN AN ORGANIZED HEALTH CARE EDUCATION/TRAINING PROGRAM

## 2025-03-26 PROCEDURE — 93325 DOPPLER ECHO COLOR FLOW MAPG: CPT

## 2025-03-26 PROCEDURE — S0260 H&P FOR SURGERY: HCPCS | Performed by: STUDENT IN AN ORGANIZED HEALTH CARE EDUCATION/TRAINING PROGRAM

## 2025-03-26 PROCEDURE — 25010000002 METOCLOPRAMIDE PER 10 MG: Performed by: STUDENT IN AN ORGANIZED HEALTH CARE EDUCATION/TRAINING PROGRAM

## 2025-03-26 PROCEDURE — 93321 DOPPLER ECHO F-UP/LMTD STD: CPT

## 2025-03-26 PROCEDURE — 99024 POSTOP FOLLOW-UP VISIT: CPT | Performed by: NURSE PRACTITIONER

## 2025-03-26 PROCEDURE — 93312 ECHO TRANSESOPHAGEAL: CPT | Performed by: STUDENT IN AN ORGANIZED HEALTH CARE EDUCATION/TRAINING PROGRAM

## 2025-03-26 PROCEDURE — 5A2204Z RESTORATION OF CARDIAC RHYTHM, SINGLE: ICD-10-PCS | Performed by: STUDENT IN AN ORGANIZED HEALTH CARE EDUCATION/TRAINING PROGRAM

## 2025-03-26 PROCEDURE — 25010000002 ENOXAPARIN PER 10 MG: Performed by: STUDENT IN AN ORGANIZED HEALTH CARE EDUCATION/TRAINING PROGRAM

## 2025-03-26 PROCEDURE — 99232 SBSQ HOSP IP/OBS MODERATE 35: CPT | Performed by: NURSE PRACTITIONER

## 2025-03-26 PROCEDURE — 25010000002 PROPOFOL 1000 MG/100ML EMULSION

## 2025-03-26 RX ORDER — AMIODARONE HYDROCHLORIDE 200 MG/1
400 TABLET ORAL 3 TIMES DAILY
Qty: 42 TABLET | Refills: 0 | Status: SHIPPED | OUTPATIENT
Start: 2025-03-26 | End: 2025-03-26

## 2025-03-26 RX ORDER — ATORVASTATIN CALCIUM 10 MG/1
10 TABLET, FILM COATED ORAL DAILY
Qty: 90 TABLET | Refills: 3 | Status: SHIPPED | OUTPATIENT
Start: 2025-03-26

## 2025-03-26 RX ORDER — AMIODARONE HYDROCHLORIDE 200 MG/1
TABLET ORAL
Qty: 120 TABLET | Refills: 3 | Status: SHIPPED | OUTPATIENT
Start: 2025-03-26 | End: 2025-04-02

## 2025-03-26 RX ORDER — SODIUM CHLORIDE 9 MG/ML
INJECTION, SOLUTION INTRAVENOUS CONTINUOUS PRN
Status: DISCONTINUED | OUTPATIENT
Start: 2025-03-26 | End: 2025-03-26 | Stop reason: SURG

## 2025-03-26 RX ORDER — PROPOFOL 10 MG/ML
INJECTION, EMULSION INTRAVENOUS AS NEEDED
Status: DISCONTINUED | OUTPATIENT
Start: 2025-03-26 | End: 2025-03-26 | Stop reason: SURG

## 2025-03-26 RX ORDER — AMIODARONE HYDROCHLORIDE 200 MG/1
200 TABLET ORAL DAILY
Qty: 30 TABLET | Refills: 0 | Status: SHIPPED | OUTPATIENT
Start: 2025-03-26 | End: 2025-03-26 | Stop reason: HOSPADM

## 2025-03-26 RX ORDER — LIDOCAINE HYDROCHLORIDE 20 MG/ML
INJECTION, SOLUTION EPIDURAL; INFILTRATION; INTRACAUDAL; PERINEURAL AS NEEDED
Status: DISCONTINUED | OUTPATIENT
Start: 2025-03-26 | End: 2025-03-26 | Stop reason: SURG

## 2025-03-26 RX ADMIN — PROPOFOL 160 MG: 10 INJECTION, EMULSION INTRAVENOUS at 11:22

## 2025-03-26 RX ADMIN — AMIODARONE HYDROCHLORIDE 400 MG: 200 TABLET ORAL at 00:08

## 2025-03-26 RX ADMIN — LIDOCAINE HYDROCHLORIDE 100 MG: 20 INJECTION, SOLUTION EPIDURAL; INFILTRATION; INTRACAUDAL; PERINEURAL at 11:22

## 2025-03-26 RX ADMIN — METOCLOPRAMIDE HYDROCHLORIDE 10 MG: 5 INJECTION INTRAMUSCULAR; INTRAVENOUS at 06:28

## 2025-03-26 RX ADMIN — AMIODARONE HYDROCHLORIDE 400 MG: 200 TABLET ORAL at 14:52

## 2025-03-26 RX ADMIN — DILTIAZEM HYDROCHLORIDE 240 MG: 240 CAPSULE, EXTENDED RELEASE ORAL at 09:07

## 2025-03-26 RX ADMIN — ENOXAPARIN SODIUM 105 MG: 120 INJECTION SUBCUTANEOUS at 14:52

## 2025-03-26 RX ADMIN — METOCLOPRAMIDE HYDROCHLORIDE 10 MG: 5 INJECTION INTRAMUSCULAR; INTRAVENOUS at 14:52

## 2025-03-26 RX ADMIN — Medication 10 ML: at 08:53

## 2025-03-26 RX ADMIN — SODIUM CHLORIDE: 9 INJECTION, SOLUTION INTRAVENOUS at 11:04

## 2025-03-26 RX ADMIN — PANTOPRAZOLE SODIUM 40 MG: 40 INJECTION, POWDER, FOR SOLUTION INTRAVENOUS at 06:27

## 2025-03-26 RX ADMIN — AMIODARONE HYDROCHLORIDE 400 MG: 200 TABLET ORAL at 06:27

## 2025-03-26 RX ADMIN — LIDOCAINE 1 PATCH: 4 PATCH TOPICAL at 08:53

## 2025-03-26 NOTE — PLAN OF CARE
Goal Outcome Evaluation:  Plan of Care Reviewed With: patient        Progress: no change  Outcome Evaluation: VSS. Pt had small amount of old blood with BM. Refused suppository. Tolerated diet, NPO for Cardioversion and RENETTA today. Moderate amount fo clear yellow drainage from LLQ lap site. Safety maintained.

## 2025-03-26 NOTE — ANESTHESIA PREPROCEDURE EVALUATION
Anesthesia Evaluation     no history of anesthetic complications:   NPO Solid Status: > 8 hours  NPO Liquid Status: > 8 hours           Airway   Mallampati: II  TM distance: >3 FB  No difficulty expected  Dental - normal exam     Pulmonary    (+) a smoker Former, asthma,shortness of breath  Cardiovascular   Exercise tolerance: poor (<4 METS)    PT is on anticoagulation therapy  Rhythm: irregular    (+) hypertension, dysrhythmias Atrial Fib, Atrial Flutter  (-) past MI, CAD, cardiac stents, CABG    ROS comment: ·  Left ventricular systolic function is normal. Left ventricular ejection fraction appears to be 61 - 65%.  ·  Left ventricular wall thickness is consistent with mild concentric hypertrophy.  ·  The right ventricular cavity is mild to moderately dilated. Normal right ventricular systolic function noted.  ·  There is mild calcification of the aortic valve. No aortic valve regurgitation is present. No hemodynamically significant aortic valve stenosis is present.  ·  No mitral valve regurgitation or significant stenosis is present. Mild mitral annular calcification is present.      Neuro/Psych  (-) seizures, TIA, CVA  GI/Hepatic/Renal/Endo    (-) GERD, no renal disease, diabetes    Musculoskeletal     Abdominal    Substance History   (+) drug use     OB/GYN          Other      history of cancer                      Anesthesia Plan    ASA 3     MAC     intravenous induction     Anesthetic plan, risks, benefits, and alternatives have been provided, discussed and informed consent has been obtained with: patient.        CODE STATUS:    Code Status (Patient has no pulse and is not breathing): CPR (Attempt to Resuscitate)  Medical Interventions (Patient has pulse or is breathing): Full Support

## 2025-03-26 NOTE — PROGRESS NOTES
[facility] GENERAL SURGERY    PROGRESS NOTE    Today's Date: 03/26/25    Patient Name: Kodi Stark  MRN: [id number]  CSN: [id number]  PCP: Holly Montoya MD  Attending Provider: Kodi Cadena MD  Length of Stay: 7    SUBJECTIVE     Kodi Stark was admitted on 3/19 for planned laparoscopic robotic assisted right colectomy with anastomosis for treatment of ascending colon adenomatous polyp with high-grade dysplasia. Patient's recovery was complicated by postoperative ileus.  NG tube DC'd yesterday and started on clear liquid diet.  Cardiac EP consulted with plans to cardiovert today.  Patient was tolerating clear liquid diet until made n.p.o. for cardioversion today.  Abdomen soft nontender.  Bowel movements have continued.  No acute complaints today.      Visit Dx:    ICD-10-CM ICD-9-CM   1. Tubular adenoma of colon  D12.6 211.3   2. High grade dysplasia in colonic adenoma  D12.6 211.3       Hospital Problem List:     Dysplastic colon polyp    Tubular adenoma of colon    High grade dysplasia in colonic adenoma    Atrial fibrillation with rapid ventricular response      History:   Past Medical History:   Diagnosis Date    Abnormal ECG     Adenomatous colon polyp 2023    POSITIVE FOR CANCER AND REMOVED VIA COLONOSCOPY IN 2023    Arrhythmia 2/11/25    Pre-op    Asthma     Upon exertion    Cancer 5/23    1 colon polyp    Colon polyp     High cholesterol     Hypertension     New onset a-fib 02/11/2025    WITH RVR    Tubular adenoma of colon 02/04/2025    of the ascending colon with high grade dysplasia     Past Surgical History:   Procedure Laterality Date    COLON RESECTION Right 3/19/2025    Procedure: LAPAROSCOPIC RIGHT COLECTOMY WITH DAVINCI ROBOT (MINIMALLY INVASIVE COLON RESECTION), CONVERSION TO OPEN;  Surgeon: Kodi Cadena MD;  Location: [facility];  Service: Robotics - DaVinci;  Laterality: Right;    COLONOSCOPY       Social History     Socioeconomic History    Marital status:    Tobacco  Use    Smoking status: Former     Current packs/day: 0.00     Average packs/day: 1.5 packs/day for 40.0 years (60.0 ttl pk-yrs)     Types: Cigarettes     Start date:      Quit date:      Years since quittin.2     Passive exposure: Never    Smokeless tobacco: Never   Vaping Use    Vaping status: Never Used   Substance and Sexual Activity    Alcohol use: Yes     Alcohol/week: 24.0 standard drinks of alcohol     Types: 24 Cans of beer per week     Comment: DAILY BEER COUPLE A DAY    Drug use: Not Currently    Sexual activity: Defer       Allergies:  No Known Allergies    Medications:    Current Facility-Administered Medications:     amiodarone (PACERONE) tablet 400 mg, 400 mg, Oral, Q8H, Juan Foster MD, 400 mg at 25 0627    bisacodyl (DULCOLAX) suppository 10 mg, 10 mg, Rectal, BID, Kodi Cadena MD, 10 mg at 25 0953    Calcium Replacement - Follow Nurse / BPA Driven Protocol, , Not Applicable, PRN, Kodi Cadena MD    [Held by provider] dilTIAZem (CARDIZEM) injection 10 mg, 10 mg, Intravenous, 4x Daily, Kodi Cadena MD, 10 mg at 25 0857    dilTIAZem CD (CARDIZEM CD) 24 hr capsule 240 mg, 240 mg, Oral, Q24H, Kodi Cadena MD, 240 mg at 25 0831    enoxaparin sodium (LOVENOX) syringe 105 mg, 1 mg/kg, Subcutaneous, Q24H, Kodi Cadena MD, 105 mg at 25 1424    Lidocaine 4 % 1 patch, 1 patch, Transdermal, Q24H, Kodi Cadena MD, 1 patch at 25 0903    [Held by provider] lisinopril (PRINIVIL,ZESTRIL) tablet 40 mg, 40 mg, Oral, Daily, Kodi Cadena MD, 40 mg at 25 0831    Magnesium Standard Dose Replacement - Follow Nurse / BPA Driven Protocol, , Not Applicable, PRNSurinder John H, MD    metoclopramide (REGLAN) injection 10 mg, 10 mg, Intravenous, Q8H, Kodi Cadena MD, 10 mg at 25 0628    ondansetron ODT (ZOFRAN-ODT) disintegrating tablet 4 mg, 4 mg, Oral, Q6H PRN **OR** ondansetron (ZOFRAN) injection 4 mg, 4 mg, Intravenous, Q6H PRN, Kodi Cadena MD, 4 mg at  03/23/25 1834    pantoprazole (PROTONIX) injection 40 mg, 40 mg, Intravenous, Q AM, Kodi Cadena MD, 40 mg at 03/26/25 0627    Phosphorus Replacement - Follow Nurse / BPA Driven Protocol, , Not Applicable, Surinder COOK John H, MD    Potassium Replacement - Follow Nurse / BPA Driven Protocol, , Not Applicable, Surinder COOK John H, MD    sodium chloride 0.9 % flush 10 mL, 10 mL, Intravenous, Q12H, Kodi Cadena MD, 10 mL at 03/25/25 2105    sodium chloride 0.9 % flush 10 mL, 10 mL, Intravenous, PRSurinder DUDLEY John H, MD    sodium chloride 0.9 % infusion 40 mL, 40 mL, Intravenous, Surinder COOK John H, MD      OBJECTIVE     Vitals:    03/26/25 0819   BP: 108/74   Pulse: (!) 143   Resp: 18   Temp: 97.5 °F (36.4 °C)   SpO2: 94%       Temp:  [97.5 °F (36.4 °C)-99.3 °F (37.4 °C)] 97.5 °F (36.4 °C)  Heart Rate:  [] 143  Resp:  [18] 18  BP: (108-123)/(71-81) 108/74     PHYSICAL EXAM   Physical Exam  Vitals and nursing note reviewed.   Constitutional:       General: He is awake.      Appearance: Normal appearance. He is obese.      Comments: Body mass index is 34.07 kg/m².    HENT:      Head: Normocephalic and atraumatic.   Eyes:      General: Lids are normal. Gaze aligned appropriately.   Cardiovascular:      Rate and Rhythm: Tachycardia present. Rhythm irregular.      Comments: Afib Heart Rate:  [] 143  Pulmonary:      Effort: Pulmonary effort is normal. No respiratory distress.   Abdominal:      General: Abdomen is protuberant.      Palpations: Abdomen is soft.      Tenderness: There is no abdominal tenderness.   Musculoskeletal:      Cervical back: Normal range of motion and neck supple.   Skin:     General: Skin is warm and dry.      Comments: LLQ incision steristrips removed and optifoam dressing in place. Draining serosanguinous drainage. No erythema present. No tenderness to site.    Neurological:      Mental Status: He is alert and oriented to person, place, and time.   Psychiatric:         Attention and  "Perception: Attention normal.         Mood and Affect: Mood normal.         Speech: Speech normal.         Behavior: Behavior is cooperative.      Results Review:  Lab Results (last 48 hours)       Procedure Component Value Units Date/Time    Potassium [[id number]]  (Normal) Collected: 03/25/25 2146    Specimen: Blood Updated: 03/25/25 2225     Potassium 3.7 mmol/L     Potassium [[id number]]  (Normal) Collected: 03/25/25 0839    Specimen: Blood Updated: 03/25/25 0923     Potassium 3.6 mmol/L     Procalcitonin [[id number]]  (Normal) Collected: 03/25/25 0304    Specimen: Blood Updated: 03/25/25 0418     Procalcitonin 0.13 ng/mL     Narrative:      As a Marker for Sepsis (Non-Neonates):    1. <0.5 ng/mL represents a low risk of severe sepsis and/or septic shock.  2. >2 ng/mL represents a high risk of severe sepsis and/or septic shock.    As a Marker for Lower Respiratory Tract Infections that require antibiotic therapy:    PCT on Admission    Antibiotic Therapy       6-12 Hrs later    >0.5                Strongly Recommended  >0.25 - <0.5        Recommended   0.1 - 0.25          Discouraged              Remeasure/reassess PCT  <0.1                Strongly Discouraged     Remeasure/reassess PCT    As 28 day mortality risk marker: \"Change in Procalcitonin Result\" (>80% or <=80%) if Day 0 (or Day 1) and Day 4 values are available. Refer to [email or web address]-[email or web address]    Change in PCT <=80%  A decrease of PCT levels below or equal to 80% defines a positive change in PCT test result representing a higher risk for 28-day all-cause mortality of patients diagnosed with severe sepsis for septic shock.    Change in PCT >80%  A decrease of PCT levels of more than 80% defines a negative change in PCT result representing a lower risk for 28-day all-cause mortality of patients diagnosed with severe sepsis or septic shock.       Comprehensive Metabolic Panel [[id number]]  (Abnormal) Collected: 03/25/25 0304    Specimen: Blood " Updated: 03/25/25 0412     Glucose 125 mg/dL      BUN 27 mg/dL      Creatinine 1.10 mg/dL      Sodium 137 mmol/L      Potassium 3.3 mmol/L      Chloride 92 mmol/L      CO2 33.0 mmol/L      Calcium 8.8 mg/dL      Total Protein 6.6 g/dL      Albumin 3.2 g/dL      ALT (SGPT) 9 U/L      AST (SGOT) 17 U/L      Alkaline Phosphatase 36 U/L      Total Bilirubin 0.4 mg/dL      Globulin 3.4 gm/dL      A/G Ratio 0.9 g/dL      BUN/Creatinine Ratio 24.5     Anion Gap 12.0 mmol/L      eGFR 69.6 mL/min/1.73     Narrative:      GFR Categories in Chronic Kidney Disease (CKD)      GFR Category          GFR (mL/min/1.73)    Interpretation  G1                     90 or greater         Normal or high (1)  G2                      60-89                Mild decrease (1)  G3a                   45-59                Mild to moderate decrease  G3b                   30-44                Moderate to severe decrease  G4                    15-29                Severe decrease  G5                    14 or less           Kidney failure          (1)In the absence of evidence of kidney disease, neither GFR category G1 or G2 fulfill the criteria for CKD.    eGFR calculation 2021 CKD-EPI creatinine equation, which does not include race as a factor    Magnesium [[id number]]  (Normal) Collected: 03/25/25 0304    Specimen: Blood Updated: 03/25/25 0412     Magnesium 1.9 mg/dL     Phosphorus [[id number]]  (Abnormal) Collected: 03/25/25 0304    Specimen: Blood Updated: 03/25/25 0412     Phosphorus 2.4 mg/dL     CBC & Differential [[id number]]  (Abnormal) Collected: 03/25/25 0304    Specimen: Blood Updated: 03/25/25 0346    Narrative:      The following orders were created for panel order CBC & Differential.  Procedure                               Abnormality         Status                     ---------                               -----------         ------                     CBC Auto Differential[[id number]]        Abnormal            Final result                  Please view results for these tests on the individual orders.    CBC Auto Differential [[id number]]  (Abnormal) Collected: 03/25/25 0304    Specimen: Blood Updated: 03/25/25 0346     WBC 5.54 10*3/mm3      RBC 3.31 10*6/mm3      Hemoglobin 10.0 g/dL      Hematocrit 30.8 %      MCV 93.1 fL      MCH 30.2 pg      MCHC 32.5 g/dL      RDW 12.5 %      RDW-SD 43.0 fl      MPV 10.2 fL      Platelets 305 10*3/mm3      Neutrophil % 72.7 %      Lymphocyte % 10.6 %      Monocyte % 13.7 %      Eosinophil % 2.2 %      Basophil % 0.4 %      Immature Grans % 0.4 %      Neutrophils, Absolute 4.03 10*3/mm3      Lymphocytes, Absolute 0.59 10*3/mm3      Monocytes, Absolute 0.76 10*3/mm3      Eosinophils, Absolute 0.12 10*3/mm3      Basophils, Absolute 0.02 10*3/mm3      Immature Grans, Absolute 0.02 10*3/mm3      nRBC 0.0 /100 WBC     Potassium [[id number]]  (Normal) Collected: 03/24/25 1827    Specimen: Blood Updated: 03/24/25 1936     Potassium 3.6 mmol/L     Tissue Pathology Exam [[id number]] Collected: 03/19/25 1121    Specimen: Tissue from Large Intestine, Right / Ascending Colon Updated: 03/24/25 0910     Note to Patients --     This report may contain a detailed description of human tissue sent by a health care provider to the laboratory for pathologic evaluation. The content of this report is essential for diagnosis and may provide important critical findings. This information may be unfamiliar to patients to review without a medical professional present. It is advised that the patient review this report in the presence of a health care provider who can answer questions and explain the results.       Case Report --     Surgical Pathology Report                         Case: [id number]                                Authorizing Provider:  Kodi Cadena MD           Collected:           03/19/2025 11:21 AM        Ordering Location:     [facility] OR  Received:            03/19/2025 11:58 AM        Pathologist:            Thomas Cole MD                                                   Specimen:    Large Intestine, Right / Ascending Colon                                                  Clinical Information --     Adenomatous polyp with at least high-grade dysplasia (no evidence of invasion in sample) on ascending colon mass biopsy (Brooke Nassar) [id number]; 02/03/25, biopsies A, B, and E separate ascending (A and B) and descending (E) colon polyps, each with tubular adenoma negative for high-grade dysplasia, biopsy D a transverse colon tubular adenoma negative for high-grade dysplasia, with synchronous submucosal lipoma, and biopsy F a rectal hyperplastic polyp. 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       Final Diagnosis --     Terminal ileum and ascending colon with appendix, segmental resection:  -   Proximal colon including area of tattoo, and proximal bowel end, with full-thickness bowel wall without focal tattoo and no further diagnostic alterations (negative for adenoma/adenomatous epithelial changes, dysplasia, and malignancy).  -   Fifteen benign-appearing lymph nodes with mild cortical hyperplasia, some with tattoo pigment (negative for metastatic malignancy).  -   Ileum sample (proximal bowel end) with full-thickness bowel wall without diagnostic alterations.  -   Sampled mesentery showing focally congested/hemorrhagic mature adipose  "tissue without further diagnostic alterations.  -   Appendix, complete cross-sections, and complete longitudinal sections of the tip, without diagnostic alterations.       Gross Description --     1. Large Intestine, Right / Ascending Colon.  Received in a correctly labeled formalin filled container as \"large intestine, right/ascending colon\" is a 2 cm long x 2 cm diameter segment of terminal ileum and 38 x 3.2 cm contiguous segment of proximal colon, with fatty mesocolon up to 6.4 cm wide. The proximal bowel end is inked blue and the distal black. There is an area of pigmented tattooing approximately 2.8 cm from the distal and 26 cm from the proximal bowel ends. On opening the bowel contiguously, mucosa in this region underlying the tattoo is smooth without discrete findings including evidence of a prior biopsy.  The remaining small and large bowel mucosa is free of discrete lesions. The attached 6.8 x 0.8 cm appendix shows no discrete findings on initial inspection, cross-sections of the body, or longitudinal sections of the tip. The surrounding mesentery is searched for lymph nodes, identifying numerous to 5 mm candidates. Separate in the container is omental fat measuring 41.8 x 40.0 x 2.5 cm, thinly sectioned to reveal only unremarkable fatty tissue. Sampled in fifteen cassettes:  1A, appendix (bisected tip and luminal cross-section  1B, proximal bowel end  1C, distal bowel end  1D- 1K, sections from tattooed area  1L, omentum  1M-1O, lymph node candidates       Microscopic Description --     Microscopic evaluation was performed.    This report was dictated using Dragon verbal recognition software. Though it was subsequently proof-read by the pathologist-of-record, with efforts made to correct omissions and/or errors, if you note same, please call the laboratory or pathologist-of-record to have it promptly addressed.            Imaging Results (Last 72 Hours)       Procedure Component Value Units Date/Time    " XR Abdomen KUB [[id number]] Collected: 03/24/25 1033     Updated: 03/24/25 1038    Narrative:      EXAM: XR ABDOMEN KUB-      DATE: 3/24/2025 8:18 AM     HISTORY: Eval bowel gas pattern, ileus; D12.6-Benign neoplasm of colon,  unspecified; D12.6-Benign neoplasm of colon, unspecified       COMPARISON: 3/22/2025.     TECHNIQUE:   Frontal view of the abdomen. 1 image.     FINDINGS:    There are prominent air-filled loops of small bowel measuring 3.5 cm.  These have likely improved since the prior examination. There is bowel  gas distally in the colon. NG tube projects over the left upper quadrant  tip likely at the fundus of the stomach however continued attention on  follow-up is recommended.          Impression:         1. Prominent air-filled loops of small bowel appear improved since the  prior examination. FINDINGS may represent improving ileus. There is  bowel gas more distally in the colon.  2. Tip of NG tube visualized may be at the fundus of the stomach but  appears somewhat superior in position. Continued attention on follow-up  is recommended.     This report was signed and finalized on 3/24/2025 10:35 AM by Dony Hernandez.       CT Abdomen Pelvis Without Contrast [[id number]] Collected: 03/23/25 1901     Updated: 03/23/25 1913    Narrative:      EXAM/TECHNIQUE: CT abdomen pelvis with out IV contrast, with oral  contrast.     INDICATION: Postoperative ileus versus obstruction, rule out anastomotic  leak; D12.6-Benign neoplasm of colon, unspecified; D12.6-Benign neoplasm  of colon, unspecified     COMPARISON: CT 2/11/2025     DLP: 742.19 mGy.cm. Automated exposure control was utilized to decrease  patient radiation dose.     FINDINGS:     Mild atelectasis in the lung bases. Unenhanced liver, gallbladder,  biliary tree, pancreas, spleen, and adrenal glands are unremarkable. No  urolithiasis or hydronephrosis. No focal urinary bladder wall  thickening.     Postoperative change of right hemicolectomy. Right  upper quadrant  ileocolonic anastomosis appears intact. Small to moderate volume  scattered locules of pneumoperitoneum throughout the upper abdomen.  Moderate diffuse small bowel distention with small bowel loops measuring  up to 3.9 cm diameter. No area of transition point or decompressed  distal small bowel. Oral contrast has only progressed into the region of  the mid small bowel but has not transited the entire small bowel or  reached the ileocolonic anastomosis. Enteric tube is in the mid stomach.  Trace free intraperitoneal fluid.     No pelvic mass or pelvic collection. Prostate is mildly enlarged.  Seminal vesicles are symmetric. Nonaneurysmal atherosclerotic abdominal  aorta. No enlarged abdominal or pelvic lymphadenopathy. Small bilateral  fat-containing inguinal hernias scattered locules of gas in the  abdominal wall soft tissues and diffuse abdominal stranding, likely  postoperative change. No acute osseous finding.       Impression:         1.  Postoperative change of right hemicolectomy. Right upper quadrant  ileocolonic anastomosis appears intact. Oral contrast has not reached  the region of the anastomosis at the time of imaging limiting evaluation  for detection of leak. However, no significant gas, fluid, or stranding  adjacent to the anastomosis to suggest a leak. There is some small to  moderate volume scattered pneumoperitoneum in the upper abdomen, but I  suspect this represents residual postoperative gas. If concern for  postoperative leak remains high, consider delayed imaging and 4 to 8  hours after contrast has had more time to progress and reach the area of  anastomosis.     2.  Moderate diffuse small bowel distention without evidence of  transition point or decompressed distal loops. Findings are most  suggestive of postoperative ileus.           This report was signed and finalized on 3/23/2025 7:10 PM by Dr. Vito Farnsworth MD.       XR Abdomen KUB [[id number]] Collected: 03/23/25  0847     Updated: 03/23/25 0852    Narrative:      EXAMINATION:  XR ABDOMEN KUB-  3/22/2025 9:44 PM     HISTORY: NG tube placement.     COMPARISON: 3/22/2022.     TECHNIQUE: Supine image of the lower chest and upper abdomen.     FINDINGS:   The NG tube tip and sideport are in the stomach. Dilated  small bowel is visualized measuring about 5 cm. There is a small lucency  beneath the right hemidiaphragm. This may be an artifact on supine  imaging. A tiny amount of free air is possible. The patient had fairly  recent abdominal surgery.          Impression:      1. NG tube tip and sideport in the stomach.  2. Dilated small bowel.  3. Questionable tiny amount of free air beneath the right hemidiaphragm.  Recent surgery.           This report was signed and finalized on 3/23/2025 8:49 AM by Dr. Devonte Mckeon MD.                  ASSESSMENT/PLAN     Active Hospital Problems    Diagnosis     **Dysplastic colon polyp     Atrial fibrillation with rapid ventricular response     Tubular adenoma of colon     High grade dysplasia in colonic adenoma        PLAN:   Cardiac EP planning for RENETTA cardioversion today.  Patient okay to advance to GI soft diet after procedure.   Plan start Eliquis tonight and DC therapeutic Lovenox.    Discussed findings and treatment plan including risks, benefits, and treatment options with patient in detail. Patient agreed with treatment plan.      This document has been electronically signed by NEVAEH Almanza on 3/26/2025 08:27 CDT

## 2025-03-26 NOTE — ANESTHESIA POSTPROCEDURE EVALUATION
Patient: Kodi Stark    Procedure Summary       Date: 03/26/25 Room / Location: [facility] CATH LAB; [facility] CARDIOLOGY    Anesthesia Start: 1107 Anesthesia Stop: 1134    Procedure: CARDIOVERSION EXTERNAL IN CARDIOLOGY DEPARTMENT Diagnosis: (Persistent atrial fibrillation)    Scheduled Providers: Juan Foster MD Provider: Garry Harrison CRNA    Anesthesia Type: MAC ASA Status: 3            Anesthesia Type: MAC    Vitals  Vitals Value Taken Time   /74 03/26/25 11:11   Temp     Pulse     Resp     SpO2             Post Anesthesia Care and Evaluation    Patient location during evaluation: PHASE II  Patient participation: complete - patient participated  Level of consciousness: awake  Pain management: adequate    Airway patency: patent  Anesthetic complications: No anesthetic complications    Cardiovascular status: acceptable  Respiratory status: acceptable  Hydration status: acceptable

## 2025-03-26 NOTE — PROGRESS NOTES
Remains in atrial fibrillation.    Exam:  Tachycardic, irregular  Normal work of breathing    Assessment:  1.  Atrial fibrillation with acute exacerbation    Plan: Proceed with RENETTA and cardioversion

## 2025-03-26 NOTE — PLAN OF CARE
Goal Outcome Evaluation:  Plan of Care Reviewed With: patient        Progress: improving  Outcome Evaluation: RENETTA AND CARDIOVERSION TODAY. ROOM AIR. CONT. PULSE OX INTACT. TELEMETRY ON. NSR PER MONITOR TECH. FULL LIQUID DIET. PT. TOLERATED DIET OK. PT. VOIDING. BM TODAY. LAP INCISIONS X 4 NOTED IN ADDITION TO MIDLINE WITH STERI STRIPS INTACT. NO DISTRESS NOTED.

## 2025-03-26 NOTE — PROGRESS NOTES
Nutrition Services  LOS Screening  Patient Name:  Kodi Stark  YOB: 1950  MRN: [id number]  Admit Date:  3/19/2025    Nutrition screening and chart review completed. Pt remains at low risk/no risk for malnutrition. Pt reports good appetite considering liquid diet/NPO for most of LOS. Pt okay with chocolate or vanilla Boost Original. Regular diet today. Continue with daily menu selections and observe food preferences. Will monitor while inpatient and follow per protocol.       Electronically signed by:  Galina Moon RDN, LD  03/26/25 13:18 CDT

## 2025-03-26 NOTE — DISCHARGE SUMMARY
Consults       Date and Time Order Name Status Description    3/25/2025 11:24 AM Inpatient Cardiac Electrophysiologist Consult Completed            General Surgery - Discharge Summary    Date of Discharge:  3/26/2025    Discharge Diagnosis: Dysplastic colon polyp    Presenting Problem/History of Present Illness  Tubular adenoma of colon [D12.6]  High grade dysplasia in colonic adenoma [D12.6]  Dysplastic colon polyp [K63.5]     Hospital Course  Patient is a 75 y.o. male was admitted on 3/19 for planned laparoscopic robotic assisted right colectomy with anastomosis for treatment of ascending colon adenomatous polyp with high-grade dysplasia. Patient's recovery was complicated by postoperative ileus.  NG tube DC'd yesterday and started on clear liquid diet.  Patient has experienced persistent atrial fibrillation and atrial flutter which he was recently diagnosed with in February.  This was not controlled with Amiodorone drip and Cardiac EP was consulted. Patient had RENETTA and cardioversion today by Dr. Foster.  Patient was tolerating clear liquid diet until made n.p.o. for cardioversion today.  He had regular diet for lunch with no complaints. Patient is ready for discharge home today.     Procedures Performed  Procedure(s):  LAPAROSCOPIC RIGHT COLECTOMY WITH DAVINCI ROBOT (MINIMALLY INVASIVE COLON RESECTION), CONVERSION TO OPEN       Consults:   Consults       Date and Time Order Name Status Description    3/25/2025 11:24 AM Inpatient Cardiac Electrophysiologist Consult Completed             Condition on Discharge:  Stable for discharge home.     Vital Signs  Temp:  [97.3 °F (36.3 °C)-99.3 °F (37.4 °C)] 98.2 °F (36.8 °C)  Heart Rate:  [] 79  Resp:  [15-19] 16  BP: ()/(63-82) 125/75    Physical Exam:   See History and Physical found in chart.    Discharge Disposition  Home or Self Care    Discharge Medications     Discharge Medications        New Medications        Instructions Start Date   amiodarone 200 MG  tablet  Commonly known as: Pacerone   400 mg, Oral, 3 times daily      amiodarone 200 MG tablet  Commonly known as: Pacerone   200 mg, Oral, Daily      cyclobenzaprine 5 MG tablet  Commonly known as: FLEXERIL   5 mg, Oral, Every 8 Hours PRN      gabapentin 100 MG capsule  Commonly known as: NEURONTIN   100 mg, Oral, 3 Times Daily             Continue These Medications        Instructions Start Date   apixaban 5 MG tablet tablet  Commonly known as: ELIQUIS   5 mg, Oral, 2 Times Daily      dilTIAZem  MG 24 hr capsule  Commonly known as: CARDIZEM CD   240 mg, Oral, Every 24 Hours Scheduled      lisinopril 40 MG tablet  Commonly known as: PRINIVIL,ZESTRIL   1 tablet, Daily      simvastatin 10 MG tablet  Commonly known as: ZOCOR   10 mg, Oral, Nightly, Ordered daily, but pt takes every 3-4 days due to muscle cramps             ASK your doctor about these medications        Instructions Start Date   oxyCODONE 5 MG immediate release tablet  Commonly known as: Roxicodone  Ask about: Should I take this medication?   5 mg, Oral, Every 4 Hours PRN               Discharge Diet:   Diet Instructions       Diet: Regular/House Diet; Thin (IDDSI 0)      Discharge Diet: Regular/House Diet    Fluid Consistency: Thin (IDDSI 0)            Activity at Discharge: Okay to shower 48 hours after surgery.  No soaking under water for 2 weeks.  May perform light activities after surgery.  Avoid lifting more than 20 pounds or excessive straining for 4 weeks after surgery.  Alternate ibuprofen and tylenol scheduled around-the-clock. Take prescription pain medication exactly as directed.  Take a stool softener while on prescription pain medication.  Okay to drive once off of pain medication. Continue on a regular diet. Okay to return to work on light duty 2 weeks after surgery or return to work on full duty 4 weeks after surgery.  Clear for full activity after 4 weeks.  Deep sutures will dissolve on their own and Steri-Strips should peel off  in 2 to 4 weeks.  Staples and/or skin sutures will be removed 14 days from surgery.     Follow-up Appointments  Follow up with Dr. Cadena in 3 weeks.   Follow up with PCP after discharge.       Electronically signed by NEVAEH Almanza, 03/26/25, 2:04 PM CDT.

## 2025-03-26 NOTE — CONSULTS
"EP CONSULT NOTE    Subjective        History of Present Illness         EP Problems:  1.  Atrial fibrillation, persistent  2.  Atrial flutter, uncertain type     Cardiology Problems:  1.  Hypertension  2.  Hyperlipidemia     Medical Problems:  1.  Alcohol use disorder  2.  Colon cancer (2023)          Kodi Stark is a 75 y.o. male with problem list as above for whom EP is consulted regarding persistent atrial fibrillation and atrial flutter.  He was diagnosed with atrial fibrillation and atrial flutter.  February 2025.  He at that time was treated with a rate control strategy given planned upcoming abdominal surgery.  He underwent surgery last week and has done well since that time however his postoperative course has been complicated by rapid ventricular rates.  Given these findings, EP is consulted for further evaluation.    Objective   Vital Signs:  /78 (BP Location: Right arm, Patient Position: Lying)   Pulse 112   Temp 97.6 °F (36.4 °C) (Oral)   Resp 18   Ht 180 cm (70.87\")   Wt 110 kg (243 lb 6.2 oz)   SpO2 94%   BMI 34.07 kg/m²   Estimated body mass index is 34.07 kg/m² as calculated from the following:    Height as of this encounter: 180 cm (70.87\").    Weight as of this encounter: 110 kg (243 lb 6.2 oz).      Physical Exam   Awake, alert, no acute distress  Clear to auscultation bilaterally  Surgical scars present along the abdomen, moderate abdominal dilatation  Legs are warm and well-perfused without significant lower extremity KAREY  Heart rhythm is irregular, tachycardic    Result Review :  The following data was reviewed by: Juan Foster MD on 02/06/2025:  CMP          3/23/2025    10:22 3/23/2025    14:22 3/23/2025    17:56 3/24/2025    04:37 3/24/2025    18:27 3/25/2025    03:04 3/25/2025    08:39 3/25/2025    21:46   CMP   Glucose 122  115   129   125      BUN 31  32   33   27      Creatinine 3.40  3.05   2.12   1.10      EGFR 18.0  20.5   31.7   69.6      Sodium 136  137   137   137    "   Potassium 3.6  3.7  3.5  3.5  3.6  3.3  3.6  3.7    Chloride 91  92   90   92      Calcium 9.2  9.0   9.1   8.8      Total Protein    6.5   6.6      Albumin    3.3   3.2      Globulin    3.2   3.4      Total Bilirubin    0.5   0.4      Alkaline Phosphatase    33   36      AST (SGOT)    20   17      ALT (SGPT)    11   9      Albumin/Globulin Ratio    1.0   0.9      BUN/Creatinine Ratio 9.1  10.5   15.6   24.5      Anion Gap 12.0  13.0   13.0   12.0        CBC          3/23/2025    09:01 3/24/2025    04:37 3/25/2025    03:04   CBC   WBC 8.50  5.01  5.54    RBC 3.76  3.44  3.31    Hemoglobin 11.3  10.4  10.0    Hematocrit 35.1  32.4  30.8    MCV 93.4  94.2  93.1    MCH 30.1  30.2  30.2    MCHC 32.2  32.1  32.5    RDW 12.6  12.5  12.5    Platelets 413  352  305      TSH          2/11/2025    16:01   TSH   TSH 2.120          [id number] SCORE   [id number] Score: 3 (3/25/2025 10:47 PM)           Assessment and Plan   Problems:  Persistent atrial fibrillation, with acute exacerbation  Atrial flutter, uncertain type    Kodi Stark is a 75 y.o. male with problem list as above for whom EP is consulted regarding atrial fibrillation and atrial flutter.  He continues to manage in atrial fibrillation now with postoperative RVR.  I do think that it is a reasonable timeframe to consider restoration of normal sinus rhythm.  He is already been started on amiodarone and we will continue this medication for now.  Plans for RENETTA and cardioversion tomorrow.    Plan:  -Plan for RENETTA and cardioversion tomorrow  -Continue anticoagulation given elevated [id number] (can transition to apixaban from EP standpoint)  -Change amiodarone to 400 mg 3 times daily for 7 days, then 200 mg daily thereafter  -N.p.o. at midnight                   Part of this note may be an electronic transcription/translation of spoken language to printed text using the Dragon Dictation System.

## 2025-03-27 NOTE — OUTREACH NOTE
Prep Survey      Flowsheet Row Responses   [employer] facility patient discharged from? [place]   Is LACE score < 7 ? No   Eligibility Readm Mgmt   Discharge diagnosis Dysplastic colon polyp   Does the patient have one of the following disease processes/diagnoses(primary or secondary)? Other   Does the patient have Home health ordered? No   Is there a DME ordered? No   Prep survey completed? Yes            LUIS M SHIPLEY - Registered Nurse

## 2025-04-01 NOTE — PROGRESS NOTES
"EP FOLLOW UP PATIENT VISIT    Chief Complaint  Atrial Fibrillation (1 week follow up )    Subjective        History of Present Illness    EP Problems:  1.  Atrial fibrillation, persistent  2.  Atrial flutter, uncertain type     Cardiology Problems:  1.  Hypertension  2.  Hyperlipidemia     Medical Problems:  1.  Alcohol use disorder  2.  Colon cancer (2023)    Patient is a 75-year-old male who presents to the clinic today following up for atrial fibrillation and atrial flutter. This is a new diagnosis found at a clinic visit with cardiology in February. He was sent to the ER and found to be in atrial fibrillation with RVR and was started on anticoagulation and a rate control strategy with diltiazem. On 3/19/2025 he was admitted for a right colectomy for treatment of a colon polyp. Unfortunately his atrial fibrillation was not able to be controlled by diltiazem or an amiodarone drip and he underwent a RENETTA and cardioversion to restore him back to sinus rhythm.    Patient says he has been doing well since being discharged from the hospital. He denies any recurrence of atrial fibrillation. He says that his only symptom when he was in atrial fibrillation was increased shortness of breath. He is finished his amiodarone load and will start taking 200 mg of amiodarone daily tomorrow.    Objective   Vital Signs:  /64 (BP Location: Left arm, Patient Position: Sitting, Cuff Size: Small Adult)   Pulse 66   Ht 180.3 cm (70.98\")   Wt 110 kg (243 lb)   SpO2 99%   BMI 33.91 kg/m²   Estimated body mass index is 33.91 kg/m² as calculated from the following:    Height as of this encounter: 180.3 cm (70.98\").    Weight as of this encounter: 110 kg (243 lb).      Physical Exam  Vitals reviewed.   Constitutional:       Appearance: Normal appearance. He is obese.   Cardiovascular:      Rate and Rhythm: Normal rate and regular rhythm.      Pulses: Normal pulses.           Radial pulses are 2+ on the right side and 2+ on the left " side.        Posterior tibial pulses are 2+ on the right side and 2+ on the left side.      Heart sounds: Normal heart sounds.   Pulmonary:      Effort: Pulmonary effort is normal.      Breath sounds: Normal breath sounds.   Musculoskeletal:      Right lower leg: No edema.      Left lower leg: No edema.   Skin:     General: Skin is warm and dry.   Neurological:      Mental Status: He is alert.              Result Review :  The following data was reviewed by: NEVAEH Miller on 04/02/2025:  CMP          3/23/2025    10:22 3/23/2025    14:22 3/23/2025    17:56 3/24/2025    04:37 3/24/2025    18:27 3/25/2025    03:04 3/25/2025    08:39 3/25/2025    21:46   CMP   Glucose 122  115   129   125      BUN 31  32   33   27      Creatinine 3.40  3.05   2.12   1.10      EGFR 18.0  20.5   31.7   69.6      Sodium 136  137   137   137      Potassium 3.6  3.7  3.5  3.5  3.6  3.3  3.6  3.7    Chloride 91  92   90   92      Calcium 9.2  9.0   9.1   8.8      Total Protein    6.5   6.6      Albumin    3.3   3.2      Globulin    3.2   3.4      Total Bilirubin    0.5   0.4      Alkaline Phosphatase    33   36      AST (SGOT)    20   17      ALT (SGPT)    11   9      Albumin/Globulin Ratio    1.0   0.9      BUN/Creatinine Ratio 9.1  10.5   15.6   24.5      Anion Gap 12.0  13.0   13.0   12.0        CBC          3/23/2025    09:01 3/24/2025    04:37 3/25/2025    03:04   CBC   WBC 8.50  5.01  5.54    RBC 3.76  3.44  3.31    Hemoglobin 11.3  10.4  10.0    Hematocrit 35.1  32.4  30.8    MCV 93.4  94.2  93.1    MCH 30.1  30.2  30.2    MCHC 32.2  32.1  32.5    RDW 12.6  12.5  12.5    Platelets 413  352  305      TSH          2/11/2025    16:01   TSH   TSH 2.120          ECG 12 Lead    Date/Time: 4/2/2025 10:07 AM  Performed by: Duane Johnson APRN    Authorized by: Duane Johnson APRN  Comparison: compared with previous ECG from 3/26/2025  Similar to previous ECG  Comparison to previous ECG: PACs no longer present  Rhythm: sinus  rhythm  Rate: normal  BPM: 66  QRS axis: normal    Clinical impression: normal ECG       [id number] SCORE   [id number] Score: 3 (4/2/2025 10:08 AM)         Assessment and Plan   Diagnoses and all orders for this visit:    1. Persistent atrial fibrillation (Primary)  -     ECG 12 Lead  -     amiodarone (PACERONE) 200 MG tablet; Take 1 tablet by mouth Daily.  Dispense: 90 tablet; Refill: 3    2. Atrial flutter, unspecified type  -     ECG 12 Lead  -     amiodarone (PACERONE) 200 MG tablet; Take 1 tablet by mouth Daily.  Dispense: 90 tablet; Refill: 3        I have discussed risks, benefits, and alternatives of an electrophysiology study and ablation for atrial fibrillation with the patient.  Alternatives discussed include continued observation and medical management.  An electrophysiology study with ablation is an invasive procedure with possible complications that include but are not limited to vascular access complications, internal bleeding, tamponade requiring pericardiocentesis or cardiac surgery, stroke, MI, embolism, myocardial injury, injury to the normal conduction system requiring a pacemaker, pulmonary vein stenosis, atrio-esophageal fistula, and ultimately death.  We also discussed that given the nature of the procedure, therapeutic efficacy can be variable.  Possibilities of recurrent arrhythmias and the possible need for additional procedures and/or medical therapy was discussed. Questions asked were appropriately answered.  No guarantees were made or implied.   With this is mind, the patient chooses not to proceed at this time and instead continue amiodarone and diltiazem.    Plan:  - Patient would like to manage medically and avoid an invasive procedure for now. Continue amiodarone for rhythm control.  - Continue Eliquis given his [id number] score of 3.  - Heart rates have been controlled in the 60s and 70s. Continue diltiazem for rate control.  - Will get baseline PFTs for long-term amiodarone  use. He would prefer to have this done with his primary care provider in [facility]. They plan to fax the results once complete.         Follow Up   Return in about 3 months (around 7/2/2025).  Patient was given instructions and counseling regarding his condition or for health maintenance advice. Please see specific information pulled into the AVS if appropriate.     Part of this note may be an electronic transcription/translation of spoken language to printed text using the Dragon Dictation System.       niece

## 2025-04-02 ENCOUNTER — OFFICE VISIT (OUTPATIENT)
Dept: CARDIOLOGY | Facility: CLINIC | Age: 75
End: 2025-04-02
Payer: MEDICARE

## 2025-04-02 ENCOUNTER — READMISSION MANAGEMENT (OUTPATIENT)
Dept: CALL CENTER | Facility: HOSPITAL | Age: 75
End: 2025-04-02
Payer: COMMERCIAL

## 2025-04-02 VITALS
HEART RATE: 66 BPM | WEIGHT: 243 LBS | BODY MASS INDEX: 34.02 KG/M2 | OXYGEN SATURATION: 99 % | HEIGHT: 71 IN | SYSTOLIC BLOOD PRESSURE: 128 MMHG | DIASTOLIC BLOOD PRESSURE: 64 MMHG

## 2025-04-02 DIAGNOSIS — I48.92 ATRIAL FLUTTER, UNSPECIFIED TYPE: ICD-10-CM

## 2025-04-02 DIAGNOSIS — I48.19 PERSISTENT ATRIAL FIBRILLATION: Primary | ICD-10-CM

## 2025-04-02 RX ORDER — AMIODARONE HYDROCHLORIDE 200 MG/1
200 TABLET ORAL DAILY
Qty: 90 TABLET | Refills: 3 | Status: SHIPPED | OUTPATIENT
Start: 2025-04-02

## 2025-04-02 NOTE — OUTREACH NOTE
Medical Week 1 Survey      Flowsheet Row Responses   [facility] facility patient discharged from? [place]   Does the patient have one of the following disease processes/diagnoses(primary or secondary)? Other   Week 1 attempt successful? No   Unsuccessful attempts Attempt 1            Thea TADEO - Registered Nurse

## 2025-04-02 NOTE — PATIENT INSTRUCTIONS
Fax pulmonary function test results to us when able  Start taking 200 mg amiodarone daily  Continue to monitor heart rates

## 2025-04-07 LAB
QT INTERVAL: 276 MS
QT INTERVAL: 400 MS
QTC INTERVAL: 391 MS
QTC INTERVAL: 476 MS

## 2025-04-11 ENCOUNTER — READMISSION MANAGEMENT (OUTPATIENT)
Dept: CALL CENTER | Facility: HOSPITAL | Age: 75
End: 2025-04-11
Payer: COMMERCIAL

## 2025-04-11 NOTE — OUTREACH NOTE
Medical Week 3 Survey      Flowsheet Row Responses   [facility] [place] patient discharged from? [place]   Does the patient have one of the following disease processes/diagnoses(primary or secondary)? Other   Week 3 attempt successful? Yes   Call start time 1312   Call end time 1315   Discharge diagnosis Dysplastic colon polyp   Person spoke with today (if not patient) and relationship Surekha (Spouse)   Meds reviewed with patient/caregiver? Yes   Is the patient having any side effects they believe may be caused by any medication additions or changes? No   Does the patient have all medications ordered at discharge? Yes   Is the patient taking all medications as directed (includes completed medication regime)? Yes   Does the patient have a primary care provider?  Yes   Comments regarding PCP Patient saw PCP last week   Psychosocial issues? No   What is the patient's perception of their health status since discharge? Improving   Is the patient/caregiver able to teach back signs and symptoms related to disease process for when to call PCP? Yes   Is the patient/caregiver able to teach back signs and symptoms related to disease process for when to call 911? Yes   Is the patient/caregiver able to teach back the hierarchy of who to call/visit for symptoms/problems? PCP, Specialist, Home health nurse, Urgent Care, ED, 911 Yes   If the patient is a current smoker, are they able to teach back resources for cessation? Not a smoker   Week 3 Call Completed? Yes   Graduated Yes   Is the patient interested in additional calls from an ambulatory ? No   Would this patient benefit from a Referral to Amb Social Work? No   Wrap up additional comments Spouse states patient is doing well. No questions or concerns at this time.   Call end time 1315            CANDELARIO COOK - Registered Nurse

## 2025-04-17 ENCOUNTER — OFFICE VISIT (OUTPATIENT)
Dept: SURGERY | Facility: CLINIC | Age: 75
End: 2025-04-17
Payer: MEDICARE

## 2025-04-17 VITALS
OXYGEN SATURATION: 95 % | WEIGHT: 241 LBS | HEART RATE: 87 BPM | HEIGHT: 72 IN | DIASTOLIC BLOOD PRESSURE: 73 MMHG | SYSTOLIC BLOOD PRESSURE: 134 MMHG | BODY MASS INDEX: 32.64 KG/M2

## 2025-04-17 DIAGNOSIS — Z79.01 ON CONTINUOUS ORAL ANTICOAGULATION: ICD-10-CM

## 2025-04-17 DIAGNOSIS — D12.6 HIGH GRADE DYSPLASIA IN COLONIC ADENOMA: Primary | ICD-10-CM

## 2025-04-17 DIAGNOSIS — I48.91 ATRIAL FIBRILLATION, UNSPECIFIED TYPE: ICD-10-CM

## 2025-04-17 DIAGNOSIS — Z90.49 HISTORY OF PARTIAL COLECTOMY: ICD-10-CM

## 2025-04-17 NOTE — PROGRESS NOTES
GENERAL SURGERY OFFICE FOLLOW UP VISIT    Referring Provider: No ref. provider found  Primary Care Provider: Holly Montoya MD    Chief Complaint   Patient presents with    Post-op Follow-up       Subjective     Mr. Stark returns to clinic for follow-up after undergoing a robotic right colectomy 4 weeks ago.  His postop course was complicated by A-fib RVR which was treated with cardioversion.    History:  Past Medical History:   Diagnosis Date    Abnormal ECG     Adenomatous colon polyp     POSITIVE FOR CANCER AND REMOVED VIA COLONOSCOPY IN     Arrhythmia 25    Pre-op    Asthma     Upon exertion    Cancer     1 colon polyp    Colon polyp     High cholesterol     Hypertension     New onset a-fib 2025    WITH RVR    Tubular adenoma of colon 2025    of the ascending colon with high grade dysplasia   ,   Past Surgical History:   Procedure Laterality Date    COLON RESECTION Right 3/19/2025    Procedure: LAPAROSCOPIC RIGHT COLECTOMY WITH DAVINCI ROBOT (MINIMALLY INVASIVE COLON RESECTION), CONVERSION TO OPEN;  Surgeon: Kodi Cadean MD;  Location: [facility] OR;  Service: Robotics - DaVinci;  Laterality: Right;    COLONOSCOPY     ,   Family History   Problem Relation Age of Onset    Hypertension Mother    ,   Social History     Tobacco Use    Smoking status: Former     Current packs/day: 0.00     Average packs/day: 1.5 packs/day for 40.0 years (60.0 ttl pk-yrs)     Types: Cigarettes     Start date:      Quit date:      Years since quittin.3     Passive exposure: Never    Smokeless tobacco: Never   Vaping Use    Vaping status: Never Used   Substance Use Topics    Alcohol use: Yes     Alcohol/week: 24.0 standard drinks of alcohol     Types: 24 Cans of beer per week     Comment: DAILY BEER COUPLE A DAY    Drug use: Not Currently       Current Outpatient Medications:     amiodarone (PACERONE) 200 MG tablet, Take 1 tablet by mouth Daily., Disp: 90 tablet, Rfl: 3    apixaban  "(ELIQUIS) 5 MG tablet tablet, Take 1 tablet by mouth 2 (Two) Times a Day., Disp: 180 tablet, Rfl: 3    atorvastatin (LIPITOR) 10 MG tablet, Take 1 tablet by mouth Daily., Disp: 90 tablet, Rfl: 3    dilTIAZem CD (CARDIZEM CD) 240 MG 24 hr capsule, Take 1 capsule by mouth Daily., Disp: 30 capsule, Rfl: 0    lisinopril (PRINIVIL,ZESTRIL) 40 MG tablet, Take 1 tablet by mouth Daily., Disp: , Rfl:     Allergies:  Patient has no known allergies.      The following portions of the patient's history were reviewed and updated as appropriate: allergies, current medications, past family history, past medical history, past social history, past surgical history, and problem list.      Review of Systems  A comprehensive 14 point review of systems was performed and is negative unless otherwise noted      Objective   /73 (BP Location: Right arm, Patient Position: Sitting, Cuff Size: Adult)   Pulse 87   Ht 182.9 cm (72\")   Wt 109 kg (241 lb)   SpO2 95%   BMI 32.69 kg/m²     BMI followup discussion/instruction with patient: continue with current weight loss program, educational material, and exercise counseling      Physical Exam  Well-appearing pleasant elderly male.  Obese abdomen, incisions are healed.  No anasarca.    Labs:  I personally reviewed all pertinent labs.   Imaging: I personally reviewed all pertinent imaging studies.   Pathology:      Assessment & Plan   Diagnoses and all orders for this visit:    1. 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 On continuous oral anticoagulation    3. Atrial fibrillation, unspecified type    4. History of partial colectomy    Pathology was benign.  The patient is convalescing as expected.  He is back on his therapeutic anticoagulation.  Since he is doing so well, follow-up in 2 to 3 months.  Sooner if needed.       Thank you for the referral and trusting me with the care of your patient.    Kodi Cadena MD, FACS  General Surgery  4/17/2025  15:22 CDT    Please note " that portions of this note were completed with a voice recognition program.     Answers submitted by the patient for this visit:  Post Operative Visit (Submitted on 4/15/2025)  Chief Complaint: Follow-up  Pain Control: not requiring pain medication  Fever: no fever  Diet: adequate intake  Activity: moderately limited  Operative Site Issues: No

## 2025-07-02 ENCOUNTER — OFFICE VISIT (OUTPATIENT)
Dept: CARDIOLOGY | Facility: CLINIC | Age: 75
End: 2025-07-02
Payer: MEDICARE

## 2025-07-02 VITALS
DIASTOLIC BLOOD PRESSURE: 74 MMHG | HEIGHT: 72 IN | WEIGHT: 246.2 LBS | BODY MASS INDEX: 33.35 KG/M2 | SYSTOLIC BLOOD PRESSURE: 140 MMHG | OXYGEN SATURATION: 96 % | HEART RATE: 60 BPM

## 2025-07-02 DIAGNOSIS — I48.19 PERSISTENT ATRIAL FIBRILLATION: ICD-10-CM

## 2025-07-02 DIAGNOSIS — I48.92 ATRIAL FLUTTER, UNSPECIFIED TYPE: Primary | ICD-10-CM

## 2025-07-02 NOTE — PROGRESS NOTES
"EP FOLLOW UP PATIENT VISIT    Chief Complaint  Persistent atrial fibrillation (Atrial flutter with rapid ventricular response/3.26.2025 Cardioversion/Atrial Flutter W/ RVR )    Subjective        History of Present Illness    EP Problems:  1.  Atrial fibrillation, persistent  2.  Atrial flutter, uncertain type     Cardiology Problems:  1.  Hypertension  2.  Hyperlipidemia     Medical Problems:  1.  Alcohol use disorder  2.  Colon cancer (2023)          Patient is a 75-year-old male who presents to the clinic today following up for persistent atrial fibrillation and atrial flutter. He was newly diagnosed with this in February after he was sent to the ER and found to be in atrial fibrillation with RVR. On 3/19/2025 he was admitted and his atrial fibrillation was unable to be controlled. He was loaded on amiodarone and underwent a RENETTA with cardioversion to restore him back to sinus rhythm. At his last visit he was offered an ablation but decided he would like to stick with amiodarone understanding the risks that came with long-term use of this medication.    He said he has been doing great since his last visit. He denies any recurrences of atrial fibrillation. He checks his heart rate and blood pressures daily and they have both been well-controlled. He continues to take diltiazem for rate control said his heart rates are typically around 60 bpm. He is taking amiodarone 200 mg daily for rhythm control and denies any side effect to this medication. He said he would like to continue this medication as he feels like it is working well. He did get some baseline PFTs at [facility] and had a chest x-ray in February before starting this medication. He continues to take Eliquis for anticoagulation and denies any bleeding issues.    Objective   Vital Signs:  /74 (BP Location: Left arm, Patient Position: Sitting, Cuff Size: Adult)   Pulse 60   Ht 182.9 cm (72.01\")   Wt 112 kg (246 lb 3.2 oz)   SpO2 96%   " "BMI 33.38 kg/m²   Estimated body mass index is 33.38 kg/m² as calculated from the following:    Height as of this encounter: 182.9 cm (72.01\").    Weight as of this encounter: 112 kg (246 lb 3.2 oz).      Physical Exam  Vitals reviewed.   Constitutional:       Appearance: Normal appearance. He is obese.   Cardiovascular:      Rate and Rhythm: Normal rate and regular rhythm.      Pulses: Normal pulses.           Radial pulses are 2+ on the right side and 2+ on the left side.        Posterior tibial pulses are 2+ on the right side and 2+ on the left side.      Heart sounds: Normal heart sounds.   Pulmonary:      Effort: Pulmonary effort is normal.      Breath sounds: Normal breath sounds.   Musculoskeletal:      Right lower leg: No edema.      Left lower leg: No edema.   Skin:     General: Skin is warm and dry.   Neurological:      Mental Status: He is alert.                    Result Review :  The following data was reviewed by: NEVAEH Miller on 07/02/2025:  CMP          3/23/2025    10:22 3/23/2025    14:22 3/23/2025    17:56 3/24/2025    04:37 3/24/2025    18:27 3/25/2025    03:04 3/25/2025    08:39 3/25/2025    21:46   CMP   Glucose 122  115   129   125      BUN 31  32   33   27      Creatinine 3.40  3.05   2.12   1.10      EGFR 18.0  20.5   31.7   69.6      Sodium 136  137   137   137      Potassium 3.6  3.7  3.5  3.5  3.6  3.3  3.6  3.7    Chloride 91  92   90   92      Calcium 9.2  9.0   9.1   8.8      Total Protein    6.5   6.6      Albumin    3.3   3.2      Globulin    3.2   3.4      Total Bilirubin    0.5   0.4      Alkaline Phosphatase    33   36      AST (SGOT)    20   17      ALT (SGPT)    11   9      Albumin/Globulin Ratio    1.0   0.9      BUN/Creatinine Ratio 9.1  10.5   15.6   24.5      Anion Gap 12.0  13.0   13.0   12.0        CBC          3/23/2025    09:01 3/24/2025    04:37 3/25/2025    03:04   CBC   WBC 8.50  5.01  5.54    RBC 3.76  3.44  3.31    Hemoglobin 11.3  10.4  10.0    Hematocrit " 35.1  32.4  30.8    MCV 93.4  94.2  93.1    MCH 30.1  30.2  30.2    MCHC 32.2  32.1  32.5    RDW 12.6  12.5  12.5    Platelets 413  352  305      TSH          2/11/2025    16:01   TSH   TSH 2.120      [id number] SCORE   [id number] Score: 3 (7/2/2025  8:57 AM)      ECG 12 Lead    Date/Time: 7/2/2025 9:08 AM  Performed by: Duane Johnson APRN    Authorized by: Duane Johnson APRN  Comparison: compared with previous ECG from 4/2/2025  Similar to previous ECG  Rhythm: sinus rhythm  Rate: normal  BPM: 60  QRS axis: normal    Clinical impression: normal ECG              Assessment and Plan   Diagnoses and all orders for this visit:    1. Atrial flutter, unspecified type (Primary)  -     ECG 12 Lead    2. Persistent atrial fibrillation  -     ECG 12 Lead            Plan:  - Continue Eliquis 5 mg twice a day for anticoagulation given his elevated [id number] score of 3.  - Continue diltiazem 240 mg daily for rate control.  - Relatively young for amiodarone use. Went over the risks with him again today and he voices his understanding and would like to continue. Continue amiodarone 200 mg daily for rhythm control.  - Awaiting results of PFTs from [facility]. Will reach out to the patient if results look concerning.  - If he were to have breakthrough episodes of atrial fibrillation or negative side effects to amiodarone, would consider ablation therapy to be the next best option.  - Will need labs at his follow-up visit to look at thyroid and liver function.                   Follow Up   Return in about 6 months (around 1/2/2026).  Patient was given instructions and counseling regarding his condition or for health maintenance advice. Please see specific information pulled into the AVS if appropriate.     Part of this note may be an electronic transcription/translation of spoken language to printed text using the Dragon Dictation System.

## 2025-07-17 ENCOUNTER — TELEPHONE (OUTPATIENT)
Dept: CARDIOLOGY | Facility: CLINIC | Age: 75
End: 2025-07-17
Payer: COMMERCIAL

## 2025-07-17 ENCOUNTER — OFFICE VISIT (OUTPATIENT)
Dept: SURGERY | Facility: CLINIC | Age: 75
End: 2025-07-17
Payer: MEDICARE

## 2025-07-17 VITALS
SYSTOLIC BLOOD PRESSURE: 148 MMHG | HEIGHT: 72 IN | WEIGHT: 246 LBS | DIASTOLIC BLOOD PRESSURE: 76 MMHG | BODY MASS INDEX: 33.32 KG/M2

## 2025-07-17 DIAGNOSIS — Z90.49 HISTORY OF PARTIAL COLECTOMY: ICD-10-CM

## 2025-07-17 DIAGNOSIS — D12.6 HIGH GRADE DYSPLASIA IN COLONIC ADENOMA: Primary | ICD-10-CM

## 2025-07-17 DIAGNOSIS — E66.811 OBESITY, CLASS I, BMI 30.0-34.9 (SEE ACTUAL BMI): ICD-10-CM

## 2025-07-17 DIAGNOSIS — Z79.01 ON CONTINUOUS ORAL ANTICOAGULATION: ICD-10-CM

## 2025-07-17 NOTE — TELEPHONE ENCOUNTER
Called and spoke with patient regarding his baseline PFT results from [facility]. It appears he does have some mild to moderate COPD that he is unaware of. He endorses some shortness of breath but says that his symptoms have actually improved since starting amiodarone. Recommend we continue amiodarone at this time as he feels like it is controlling his atrial fibrillation well. If he has worsening respiratory symptoms we will need to reconsider at that time.    I also reached out to his primary care provider's office to relay the results to them. It sounds like he has an appointment in about a month and hopefully they will be able to get him started on a short acting bronchodilator as needed or set up with a pulmonologist.

## 2025-07-22 NOTE — PROGRESS NOTES
GENERAL SURGERY OFFICE FOLLOW UP VISIT    Referring Provider: No ref. provider found  Primary Care Provider: Holly Montoya MD    Chief Complaint   Patient presents with    Follow-up       Subjective     Kodi Stark presents to clinic for follow up after undergoing a robotic right colectomy for an adenomatous polyp of the ascending colon with high-grade dysplasia on 2025.  He reports that he has recovered well from surgery.  He is having normal bowel movements.  No bowel habit changes.  His appetite is good.    History:  Past Medical History:   Diagnosis Date    Abnormal ECG     Adenomatous colon polyp     POSITIVE FOR CANCER AND REMOVED VIA COLONOSCOPY IN     Arrhythmia 25    Pre-op    Asthma     Upon exertion    Cancer     1 colon polyp    Colon polyp     High cholesterol     Hypertension     New onset a-fib 2025    WITH RVR    Tubular adenoma of colon 2025    of the ascending colon with high grade dysplasia   ,   Past Surgical History:   Procedure Laterality Date    COLON RESECTION Right 3/19/2025    Procedure: LAPAROSCOPIC RIGHT COLECTOMY WITH DAVINCI ROBOT (MINIMALLY INVASIVE COLON RESECTION), CONVERSION TO OPEN;  Surgeon: Kodi Cadena MD;  Location: [facility] OR;  Service: Robotics - DaVinci;  Laterality: Right;    COLONOSCOPY     ,   Family History   Problem Relation Age of Onset    Hypertension Mother    ,   Social History     Tobacco Use    Smoking status: Former     Current packs/day: 0.00     Average packs/day: 1.5 packs/day for 40.0 years (60.0 ttl pk-yrs)     Types: Cigarettes     Start date:      Quit date:      Years since quittin.5     Passive exposure: Never    Smokeless tobacco: Never   Vaping Use    Vaping status: Never Used   Substance Use Topics    Alcohol use: Yes     Alcohol/week: 24.0 standard drinks of alcohol     Types: 24 Cans of beer per week     Comment: DAILY BEER COUPLE A DAY    Drug use: Not Currently       Current Outpatient  "Medications:     amiodarone (PACERONE) 200 MG tablet, Take 1 tablet by mouth Daily., Disp: 90 tablet, Rfl: 3    apixaban (ELIQUIS) 5 MG tablet tablet, Take 1 tablet by mouth 2 (Two) Times a Day., Disp: 180 tablet, Rfl: 3    atorvastatin (LIPITOR) 10 MG tablet, Take 1 tablet by mouth Daily., Disp: 90 tablet, Rfl: 3    dilTIAZem CD (CARDIZEM CD) 240 MG 24 hr capsule, Take 1 capsule by mouth Daily., Disp: 30 capsule, Rfl: 0    lisinopril (PRINIVIL,ZESTRIL) 40 MG tablet, Take 1 tablet by mouth Daily., Disp: , Rfl:     Allergies:  Patient has no known allergies.      The following portions of the patient's history were reviewed and updated as appropriate: allergies, current medications, past family history, past medical history, past social history, past surgical history, and problem list.      Review of Systems  A comprehensive 14 point review of systems was performed and is negative unless otherwise noted      Objective   /75   Ht 182.9 cm (72.01\")   Wt 112 kg (246 lb)   BMI 33.35 kg/m²     BMI followup discussion/instruction with patient: continue with current weight loss program, educational material, exercise counseling, nutrition counseling, and Information on healthy weight added to patient's after visit summary.      Physical Exam  Constitutional:       Appearance: Normal appearance. He is normal weight.      Comments: Pleasant elderly male, in no acute distress. Normal development, normal body habitus. Well nourished   HENT:      Head: Normocephalic and atraumatic.      Right Ear: External ear normal.      Left Ear: External ear normal.      Ears:      Comments: Hearing intact     Nose: Nose normal.      Comments: Nares patent, no septal deviation, no drainage     Mouth/Throat:      Comments: Airway patent, dentition intact, mucus membranes moist  Eyes:      Extraocular Movements: Extraocular movements intact.      Conjunctiva/sclera: Conjunctivae normal.      Pupils: Pupils are equal, round, and " reactive to light.      Comments: External eyelids grossly normal, vision intact, no scleral icterus   Neck:      Comments: Trachea midline  Cardiovascular:      Rate and Rhythm: Normal rate.      Comments: Normotensive, no JVD bilaterally  Pulmonary:      Effort: Pulmonary effort is normal.      Breath sounds: Normal breath sounds.      Comments: Normal respiratory rate  Abdominal:      Comments: Protuberant abdomen, well-healed midline incision and trocar site incision, soft, nontender to palpation, no hernias or masses   Musculoskeletal:         General: Normal range of motion.      Cervical back: Normal range of motion.   Skin:     General: Skin is warm and dry.      Comments: Skin color is consistent with ethnicity   Neurological:      General: No focal deficit present.      Mental Status: He is alert and oriented to person, place, and time.   Psychiatric:         Mood and Affect: Mood normal.         Behavior: Behavior normal.         Thought Content: Thought content normal.         Judgment: Judgment normal.      Comments: Patient understands disease process and has decision making capacity.            Labs:  I personally reviewed all pertinent labs.   Imaging: I personally reviewed all pertinent imaging studies.   Pathology:      Assessment & Plan   Diagnoses and all orders for this visit:    1. 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 On continuous oral anticoagulation    3. History of partial colectomy    4. Obesity, Class I, BMI 30.0-34.9 (see actual BMI)    75-year-old male with an adenomatous polyp with high-grade dysplasia of the ascending colon 3-months status post robotic right colectomy.  He is doing well at this time.  Follow-up in 1 year.  Sooner if needed.       Thank you for the referral and trusting me with the care of your patient.    Kodi Cadena MD, FACS  General Surgery  7/22/2025  17:01 CDT    Please note that portions of this note were completed with a voice recognition  program.

## 2025-08-06 ENCOUNTER — TELEPHONE (OUTPATIENT)
Dept: SURGERY | Facility: CLINIC | Age: 75
End: 2025-08-06
Payer: COMMERCIAL

## (undated) DEVICE — SUPPLEMENT DIGESTIVE H2O SOL GI-EASE

## (undated) DEVICE — SUT SILK 2/0 SH 30IN K833H

## (undated) DEVICE — TUBING, SUCTION, 1/4" X 12', STRAIGHT: Brand: MEDLINE

## (undated) DEVICE — COLON KIT WITH 1.1 OZ ORCA HYDRA SEAL 2 GOWN

## (undated) DEVICE — ANTIBACTERIAL UNDYED BRAIDED (POLYGLACTIN 910), SYNTHETIC ABSORBABLE SUTURE: Brand: COATED VICRYL

## (undated) DEVICE — CLEANING SPONGE: Brand: KOALA™

## (undated) DEVICE — SINGLE PORT MANIFOLD: Brand: NEPTUNE 2

## (undated) DEVICE — DECANTER: Brand: UNBRANDED

## (undated) DEVICE — SNARE POLYP SM AD W13MMXL240CM SHTH DIA2.4MM HEX STIFF

## (undated) DEVICE — CLIPPING DEVICE: Brand: RESOLUTION CLIP

## (undated) DEVICE — ARM DRAPE

## (undated) DEVICE — TRAP,MUCUS SPECIMEN,40CC: Brand: MEDLINE

## (undated) DEVICE — REDUCER: Brand: ENDOWRIST

## (undated) DEVICE — SUT PDS CT ABS SZ1 36IN VIO PDP358T

## (undated) DEVICE — PATIENT RETURN ELECTRODE, SINGLE-USE, CONTACT QUALITY MONITORING, ADULT, WITH 9FT CORD, FOR PATIENTS WEIGING OVER 33LBS. (15KG): Brand: MEGADYNE

## (undated) DEVICE — INK TATTOO ELECTR BLU

## (undated) DEVICE — SUT SILK 2/0 SH 75CM 30IN BLK C016D

## (undated) DEVICE — ENDO KIT,LOURDES HOSPITAL: Brand: MEDLINE INDUSTRIES, INC.

## (undated) DEVICE — BRUSH ENDOSCP 2 END CHN HEDGEHOG

## (undated) DEVICE — STAPLER 60: Brand: SUREFORM

## (undated) DEVICE — 3M™ IOBAN™ 2 ANTIMICROBIAL INCISE DRAPE 6650EZ: Brand: IOBAN™ 2

## (undated) DEVICE — STRIP,CLOSURE,WOUND,MEDI-STRIP,1/2X4: Brand: MEDLINE

## (undated) DEVICE — PACK,UNIVERSAL,NO GOWNS: Brand: MEDLINE

## (undated) DEVICE — SNARE ENDOSCP L240CM W15MM SHTH DIA2.4MM CHN 2.8MM STIFF

## (undated) DEVICE — SUT VIC 0 UR6 27IN VCP603H

## (undated) DEVICE — CVR HNDL LIGHT RIGID

## (undated) DEVICE — NDL FLTR BLNT 18G 1 1/2IN

## (undated) DEVICE — SUCTION IRRIGATOR: Brand: ENDOWRIST

## (undated) DEVICE — LARGE, DISPOSABLE ALEXIS O C-SECTION PROTECTOR - RETRACTOR: Brand: ALEXIS ® O C-SECTION PROTECTOR - RETRACTOR

## (undated) DEVICE — ENSEAL 20 CM SHAFT, LARGE JAW: Brand: ENSEAL X1

## (undated) DEVICE — FORCEPS BX 240CM 2.4MM L NDL RAD JAW 4 M00513334

## (undated) DEVICE — SNARE ENDOSCP W10MMXL240CM SHTH DIA24MM RND INSUL STIFF

## (undated) DEVICE — ADAPTER CLEANING PORPOISE CLEANING

## (undated) DEVICE — Device: Brand: SPOT EX ENDOSCOPIC TATTOO

## (undated) DEVICE — KT CLN CLEANOR SCPE

## (undated) DEVICE — ADHS LIQ MASTISOL 2/3ML

## (undated) DEVICE — YANKAUER,TAPERED BULBOUS TIP,VENTED: Brand: MEDLINE

## (undated) DEVICE — CANNULA NSL AD L7FT DIV O2 CO2 W/ M LUERLOCK TRMPT CONN

## (undated) DEVICE — NDL CNTR 40CT FM MAG: Brand: MEDLINE INDUSTRIES, INC.

## (undated) DEVICE — VESSEL SEALER EXTEND: Brand: ENDOWRIST

## (undated) DEVICE — BLADELESS OBTURATOR: Brand: WECK VISTA

## (undated) DEVICE — GLV SURG SENSICARE W/ALOE PF LF 7.5 STRL

## (undated) DEVICE — SNARE ENDOSCP M L240CM LOOP W27MM SHTH DIA2.4MM OVL FLX

## (undated) DEVICE — PENCL SMOKE/EVAC MEGADYNE TELESCP 10FT

## (undated) DEVICE — SYR LL TP 10ML STRL

## (undated) DEVICE — SEAL

## (undated) DEVICE — INJECTION THERAPY NEEDLE CATHETER: Brand: INTERJECT

## (undated) DEVICE — ELECTRD BLD EZ CLN MOD XLNG 2.75IN

## (undated) DEVICE — SYR LUERLOK 50ML

## (undated) DEVICE — CONTRAST AGENT KIT SYR 23 GA 10 CC TWIN PK KT W/ INTERJECT BX/10

## (undated) DEVICE — TBG INSUFFLATION LUER LOCK: Brand: MEDLINE INDUSTRIES, INC.

## (undated) DEVICE — DAVINCI: Brand: MEDLINE INDUSTRIES, INC.